# Patient Record
Sex: MALE | Race: WHITE | NOT HISPANIC OR LATINO | Employment: OTHER | URBAN - METROPOLITAN AREA
[De-identification: names, ages, dates, MRNs, and addresses within clinical notes are randomized per-mention and may not be internally consistent; named-entity substitution may affect disease eponyms.]

---

## 2017-01-02 ENCOUNTER — TELEPHONE (OUTPATIENT)
Dept: FAMILY MEDICINE | Facility: CLINIC | Age: 67
End: 2017-01-02

## 2017-01-02 ENCOUNTER — APPOINTMENT (OUTPATIENT)
Dept: GENERAL RADIOLOGY | Facility: CLINIC | Age: 67
DRG: 683 | End: 2017-01-02
Attending: EMERGENCY MEDICINE
Payer: COMMERCIAL

## 2017-01-02 ENCOUNTER — HOSPITAL ENCOUNTER (INPATIENT)
Facility: CLINIC | Age: 67
LOS: 2 days | Discharge: HOME | DRG: 683 | End: 2017-01-04
Attending: EMERGENCY MEDICINE | Admitting: INTERNAL MEDICINE
Payer: COMMERCIAL

## 2017-01-02 ENCOUNTER — APPOINTMENT (OUTPATIENT)
Dept: ULTRASOUND IMAGING | Facility: CLINIC | Age: 67
DRG: 683 | End: 2017-01-02
Attending: EMERGENCY MEDICINE
Payer: COMMERCIAL

## 2017-01-02 DIAGNOSIS — N17.9 ACUTE RENAL FAILURE (ARF) (H): ICD-10-CM

## 2017-01-02 LAB
ALBUMIN SERPL-MCNC: 3.5 G/DL (ref 3.4–5)
ALP SERPL-CCNC: 105 U/L (ref 40–150)
ALT SERPL W P-5'-P-CCNC: 28 U/L (ref 0–70)
ANION GAP SERPL CALCULATED.3IONS-SCNC: 13 MMOL/L (ref 3–14)
AST SERPL W P-5'-P-CCNC: 42 U/L (ref 0–45)
BASOPHILS # BLD AUTO: 0 10E9/L (ref 0–0.2)
BASOPHILS NFR BLD AUTO: 0.5 %
BILIRUB SERPL-MCNC: 1.2 MG/DL (ref 0.2–1.3)
BUN SERPL-MCNC: 29 MG/DL (ref 7–30)
CALCIUM SERPL-MCNC: 8.5 MG/DL (ref 8.5–10.1)
CHLORIDE SERPL-SCNC: 98 MMOL/L (ref 94–109)
CO2 SERPL-SCNC: 26 MMOL/L (ref 20–32)
CREAT SERPL-MCNC: 3.92 MG/DL (ref 0.66–1.25)
D DIMER PPP FEU-MCNC: 0.3 UG/ML FEU (ref 0–0.5)
DIFFERENTIAL METHOD BLD: ABNORMAL
EOSINOPHIL # BLD AUTO: 0.1 10E9/L (ref 0–0.7)
EOSINOPHIL NFR BLD AUTO: 1.8 %
ERYTHROCYTE [DISTWIDTH] IN BLOOD BY AUTOMATED COUNT: 20.6 % (ref 10–15)
GFR SERPL CREATININE-BSD FRML MDRD: 15 ML/MIN/1.7M2
GLUCOSE SERPL-MCNC: 114 MG/DL (ref 70–99)
HCT VFR BLD AUTO: 35.5 % (ref 40–53)
HGB BLD-MCNC: 11 G/DL (ref 13.3–17.7)
IMM GRANULOCYTES # BLD: 0.1 10E9/L (ref 0–0.4)
IMM GRANULOCYTES NFR BLD: 1.1 %
INTERPRETATION ECG - MUSE: NORMAL
LYMPHOCYTES # BLD AUTO: 1.1 10E9/L (ref 0.8–5.3)
LYMPHOCYTES NFR BLD AUTO: 15.9 %
MCH RBC QN AUTO: 25.7 PG (ref 26.5–33)
MCHC RBC AUTO-ENTMCNC: 31 G/DL (ref 31.5–36.5)
MCV RBC AUTO: 83 FL (ref 78–100)
MONOCYTES # BLD AUTO: 0.5 10E9/L (ref 0–1.3)
MONOCYTES NFR BLD AUTO: 8 %
NEUTROPHILS # BLD AUTO: 4.8 10E9/L (ref 1.6–8.3)
NEUTROPHILS NFR BLD AUTO: 72.7 %
NRBC # BLD AUTO: 0 10*3/UL
NRBC BLD AUTO-RTO: 0 /100
NT-PROBNP SERPL-MCNC: 206 PG/ML (ref 0–900)
PLATELET # BLD AUTO: 359 10E9/L (ref 150–450)
POTASSIUM SERPL-SCNC: 3.2 MMOL/L (ref 3.4–5.3)
POTASSIUM SERPL-SCNC: 3.2 MMOL/L (ref 3.4–5.3)
POTASSIUM SERPL-SCNC: 3.8 MMOL/L (ref 3.4–5.3)
PROT SERPL-MCNC: 7.4 G/DL (ref 6.8–8.8)
RBC # BLD AUTO: 4.28 10E12/L (ref 4.4–5.9)
SODIUM SERPL-SCNC: 137 MMOL/L (ref 133–144)
TROPONIN I SERPL-MCNC: NORMAL UG/L (ref 0–0.04)
WBC # BLD AUTO: 6.6 10E9/L (ref 4–11)

## 2017-01-02 PROCEDURE — 71020 XR CHEST 2 VW: CPT

## 2017-01-02 PROCEDURE — 25000132 ZZH RX MED GY IP 250 OP 250 PS 637: Performed by: INTERNAL MEDICINE

## 2017-01-02 PROCEDURE — 99223 1ST HOSP IP/OBS HIGH 75: CPT | Mod: AI | Performed by: INTERNAL MEDICINE

## 2017-01-02 PROCEDURE — 84484 ASSAY OF TROPONIN QUANT: CPT | Performed by: EMERGENCY MEDICINE

## 2017-01-02 PROCEDURE — 85025 COMPLETE CBC W/AUTO DIFF WBC: CPT | Performed by: EMERGENCY MEDICINE

## 2017-01-02 PROCEDURE — 83880 ASSAY OF NATRIURETIC PEPTIDE: CPT | Performed by: EMERGENCY MEDICINE

## 2017-01-02 PROCEDURE — 96360 HYDRATION IV INFUSION INIT: CPT

## 2017-01-02 PROCEDURE — 25000128 H RX IP 250 OP 636: Performed by: INTERNAL MEDICINE

## 2017-01-02 PROCEDURE — 93005 ELECTROCARDIOGRAM TRACING: CPT

## 2017-01-02 PROCEDURE — 36415 COLL VENOUS BLD VENIPUNCTURE: CPT | Performed by: INTERNAL MEDICINE

## 2017-01-02 PROCEDURE — 99285 EMERGENCY DEPT VISIT HI MDM: CPT | Mod: 25

## 2017-01-02 PROCEDURE — 12000000 ZZH R&B MED SURG/OB

## 2017-01-02 PROCEDURE — 80053 COMPREHEN METABOLIC PANEL: CPT | Performed by: EMERGENCY MEDICINE

## 2017-01-02 PROCEDURE — 25000128 H RX IP 250 OP 636: Performed by: EMERGENCY MEDICINE

## 2017-01-02 PROCEDURE — 85379 FIBRIN DEGRADATION QUANT: CPT | Performed by: EMERGENCY MEDICINE

## 2017-01-02 PROCEDURE — 93971 EXTREMITY STUDY: CPT | Mod: RT

## 2017-01-02 PROCEDURE — 84132 ASSAY OF SERUM POTASSIUM: CPT | Performed by: INTERNAL MEDICINE

## 2017-01-02 PROCEDURE — 25000125 ZZHC RX 250: Performed by: INTERNAL MEDICINE

## 2017-01-02 RX ORDER — ONDANSETRON 2 MG/ML
4 INJECTION INTRAMUSCULAR; INTRAVENOUS EVERY 6 HOURS PRN
Status: DISCONTINUED | OUTPATIENT
Start: 2017-01-02 | End: 2017-01-04 | Stop reason: HOSPADM

## 2017-01-02 RX ORDER — ACETAMINOPHEN 325 MG/1
650 TABLET ORAL EVERY 4 HOURS PRN
Status: DISCONTINUED | OUTPATIENT
Start: 2017-01-02 | End: 2017-01-04 | Stop reason: HOSPADM

## 2017-01-02 RX ORDER — ACETAMINOPHEN 325 MG/1
650 TABLET ORAL EVERY 4 HOURS PRN
Status: DISCONTINUED | OUTPATIENT
Start: 2017-01-02 | End: 2017-01-02

## 2017-01-02 RX ORDER — ASPIRIN 325 MG
325 TABLET ORAL DAILY
Status: DISCONTINUED | OUTPATIENT
Start: 2017-01-03 | End: 2017-01-04 | Stop reason: HOSPADM

## 2017-01-02 RX ORDER — PROCHLORPERAZINE 25 MG
12.5 SUPPOSITORY, RECTAL RECTAL EVERY 12 HOURS PRN
Status: DISCONTINUED | OUTPATIENT
Start: 2017-01-02 | End: 2017-01-04 | Stop reason: HOSPADM

## 2017-01-02 RX ORDER — OXYCODONE HYDROCHLORIDE 5 MG/1
5-10 TABLET ORAL
Status: DISCONTINUED | OUTPATIENT
Start: 2017-01-02 | End: 2017-01-04 | Stop reason: HOSPADM

## 2017-01-02 RX ORDER — POTASSIUM CHLORIDE 1500 MG/1
20-40 TABLET, EXTENDED RELEASE ORAL
Status: DISCONTINUED | OUTPATIENT
Start: 2017-01-02 | End: 2017-01-04 | Stop reason: HOSPADM

## 2017-01-02 RX ORDER — PROCHLORPERAZINE MALEATE 5 MG
5 TABLET ORAL EVERY 6 HOURS PRN
Status: DISCONTINUED | OUTPATIENT
Start: 2017-01-02 | End: 2017-01-04 | Stop reason: HOSPADM

## 2017-01-02 RX ORDER — POTASSIUM CHLORIDE 29.8 MG/ML
20 INJECTION INTRAVENOUS
Status: DISCONTINUED | OUTPATIENT
Start: 2017-01-02 | End: 2017-01-04 | Stop reason: HOSPADM

## 2017-01-02 RX ORDER — HYDRALAZINE HYDROCHLORIDE 20 MG/ML
10 INJECTION INTRAMUSCULAR; INTRAVENOUS EVERY 4 HOURS PRN
Status: DISCONTINUED | OUTPATIENT
Start: 2017-01-02 | End: 2017-01-04 | Stop reason: HOSPADM

## 2017-01-02 RX ORDER — HYDROMORPHONE HYDROCHLORIDE 1 MG/ML
.3-.5 INJECTION, SOLUTION INTRAMUSCULAR; INTRAVENOUS; SUBCUTANEOUS
Status: DISCONTINUED | OUTPATIENT
Start: 2017-01-02 | End: 2017-01-04 | Stop reason: HOSPADM

## 2017-01-02 RX ORDER — POTASSIUM CHLORIDE 1.5 G/1.58G
20-40 POWDER, FOR SOLUTION ORAL
Status: DISCONTINUED | OUTPATIENT
Start: 2017-01-02 | End: 2017-01-04 | Stop reason: HOSPADM

## 2017-01-02 RX ORDER — POTASSIUM CHLORIDE 7.45 MG/ML
10 INJECTION INTRAVENOUS
Status: DISCONTINUED | OUTPATIENT
Start: 2017-01-02 | End: 2017-01-04 | Stop reason: HOSPADM

## 2017-01-02 RX ORDER — SODIUM CHLORIDE 9 MG/ML
INJECTION, SOLUTION INTRAVENOUS CONTINUOUS
Status: DISCONTINUED | OUTPATIENT
Start: 2017-01-02 | End: 2017-01-04 | Stop reason: HOSPADM

## 2017-01-02 RX ORDER — BISACODYL 10 MG
10 SUPPOSITORY, RECTAL RECTAL DAILY PRN
Status: DISCONTINUED | OUTPATIENT
Start: 2017-01-02 | End: 2017-01-04 | Stop reason: HOSPADM

## 2017-01-02 RX ORDER — NALOXONE HYDROCHLORIDE 0.4 MG/ML
.1-.4 INJECTION, SOLUTION INTRAMUSCULAR; INTRAVENOUS; SUBCUTANEOUS
Status: DISCONTINUED | OUTPATIENT
Start: 2017-01-02 | End: 2017-01-04 | Stop reason: HOSPADM

## 2017-01-02 RX ORDER — POLYETHYLENE GLYCOL 3350 17 G/17G
17 POWDER, FOR SOLUTION ORAL DAILY PRN
Status: DISCONTINUED | OUTPATIENT
Start: 2017-01-02 | End: 2017-01-04 | Stop reason: HOSPADM

## 2017-01-02 RX ORDER — ONDANSETRON 4 MG/1
4 TABLET, ORALLY DISINTEGRATING ORAL EVERY 6 HOURS PRN
Status: DISCONTINUED | OUTPATIENT
Start: 2017-01-02 | End: 2017-01-04 | Stop reason: HOSPADM

## 2017-01-02 RX ADMIN — SODIUM CHLORIDE 250 ML: 9 INJECTION, SOLUTION INTRAVENOUS at 10:09

## 2017-01-02 RX ADMIN — POTASSIUM CHLORIDE 40 MEQ: 1500 TABLET, EXTENDED RELEASE ORAL at 13:12

## 2017-01-02 RX ADMIN — ONDANSETRON HYDROCHLORIDE 4 MG: 2 SOLUTION INTRAMUSCULAR; INTRAVENOUS at 16:21

## 2017-01-02 RX ADMIN — POTASSIUM CHLORIDE 20 MEQ: 1500 TABLET, EXTENDED RELEASE ORAL at 15:05

## 2017-01-02 RX ADMIN — SODIUM CHLORIDE: 9 INJECTION, SOLUTION INTRAVENOUS at 21:49

## 2017-01-02 RX ADMIN — SODIUM CHLORIDE: 9 INJECTION, SOLUTION INTRAVENOUS at 12:38

## 2017-01-02 RX ADMIN — POTASSIUM CHLORIDE 40 MEQ: 1500 TABLET, EXTENDED RELEASE ORAL at 17:32

## 2017-01-02 RX ADMIN — ACETAMINOPHEN 650 MG: 325 TABLET, FILM COATED ORAL at 20:59

## 2017-01-02 RX ADMIN — POTASSIUM CHLORIDE 20 MEQ: 1500 TABLET, EXTENDED RELEASE ORAL at 19:24

## 2017-01-02 ASSESSMENT — ENCOUNTER SYMPTOMS
VOMITING: 1
SHORTNESS OF BREATH: 1
NAUSEA: 0
APPETITE CHANGE: 0
DIZZINESS: 1
HEADACHES: 1

## 2017-01-02 ASSESSMENT — PAIN DESCRIPTION - DESCRIPTORS: DESCRIPTORS: HEADACHE

## 2017-01-02 NOTE — IP AVS SNAPSHOT
[address], [address]    [facility] [address]    Phone:  [phone]                                       After Visit Summary   1/2/2017    Vaibhav Crabtree    MRN: [id number]           After Visit Summary Signature Page     I have received my discharge instructions, and my questions have been answered. I have discussed any challenges I see with this plan with the nurse or doctor.    ..........................................................................................................................................  Patient/Patient Representative Signature      ..........................................................................................................................................  Patient Representative Print Name and Relationship to Patient    ..................................................               ................................................  Date                                            Time    ..........................................................................................................................................  Reviewed by Signature/Title    ...................................................              ..............................................  Date                                                            Time

## 2017-01-02 NOTE — ED NOTES
[facility]  ED Nurse Handoff Report    ED Chief complaint: Shortness of Breath      ED Diagnosis:   Final diagnoses:   None       Code Status: see epic    Allergies:   Allergies   Allergen Reactions     No Known Allergies        Activity level:  Stand with Assist     Needed?: No    Isolation: No  Infection: Not Applicable    Bariatric?: No      Vital Signs:   Filed Vitals:    01/02/17 1004 01/02/17 1005 01/02/17 1015 01/02/17 1100   BP: 103/65 77/47 100/68 109/70   Pulse:       Temp:       TempSrc:       Resp:   12 14   Height:       Weight:       SpO2: 95%   95%       Cardiac Rhythm: ,   Cardiac  Cardiac Rhythm: Normal sinus rhythm    Pain level: 0-10 Pain Scale: 3    Is this patient confused?: No    Patient Report: Initial Complaint: intermittent SOB, dizziness and left shoulder pain X 2 days. States dizziness has been so severe he has to lye down to avoid syncope, once lying he often develops HA but dizziness resolves  Focused Assessment: AOX4. Dizziness with standing. Orthostatic Hypotension, see epic.   Tests Performed: labs, cxr  Abnormal Results: crt ^  Treatments provided: 250cc NS bolus    Family Comments: none    OBS brochure/video discussed/provided to patient: No    ED Medications:   Medications   0.9% sodium chloride BOLUS (0 mLs Intravenous Stopped 1/2/17 1100)       Drips infusing?:  No      ED NURSE PHONE NUMBER: [phone]

## 2017-01-02 NOTE — TELEPHONE ENCOUNTER
Vaibhav Crabtree is a 66 year old male who calls with dizziness with standing and walking the last 2 days. Denies chest pain or weakness on one side of his body. Denies vision changes. States that he feels SOB with the dizziness. Symptoms improve with rest.    NURSING ASSESSMENT:  Description:  above  Onset/duration:  Has occurred off and on the last 2 days  Precip. factors:  Unknown, has history of CHF  Associated symptoms:  Dizziness with SOB  Improves/worsens symptoms:  Improves with rest, symptoms return when he stands  Pain scale (0-10)   0/10    Last exam/Treatment:  12/21/16  Allergies:   Allergies   Allergen Reactions     No Known Allergies        MEDICATIONS:       NURSING PLAN: Nursing advice to patient to ED now another to drive    RECOMMENDED DISPOSITION:  To ED, another person to drive - patient states that he will call his brother  Will comply with recommendation: Yes  If further questions/concerns or if symptoms do not improve, worsen or new symptoms develop, call your PCP or [facility] as soon as possible.      Guideline used:  Telephone Triage Protocols for Nurses, Third Edition, Germania Adams RN

## 2017-01-02 NOTE — ED PROVIDER NOTES
History     Chief Complaint:  Shortness of Breath      HPI   Viabhav Crabtree is a 66 year old male with a history of congestive heart failure, coronary artery disease, hypertension on apirin who presents for evaluation of shortness of breath. Two days ago while cooking turkey, the patient reports that he started to feel dizzy and has also noticed that he would have shortness of breath with exertion. He note that his symptoms resolve once he rests for some time, but he would get headaches soon afterwards. He states that his symptoms have been intermittent since the onset. The patient is also complaining of left shoulder pain with movement, but denies appetite change, nausea, vomiting, leg swelling or history of blood clots. Currently the patient is complaining of a headache. The patient recently had an ultrasound done for right leg pain with results as shown below.     Lower extremity venous duplex ultrasound, unilateral right, 12/22/16:  Conclusion: No evidence of DVT in the right lower extremity.  Probable small right knee joint effusion and Baker s cyst.     No recent medication changes or recent illness.     Allergies:  NKDA      Medications:    Protonix  Lisinopril  Carafate  Reglan  Pletal  Coreg  Lasix  Tramadol  Nitrostat  Tylenol  Aspirin  Fergon       Past Medical History:    Bite of unspecified animal  Hypertension  Congestive heart failure  Osteoporosis  Idiopathic cardiomyopathy  Hyperlipidemia  CAD  Mitral regurgitation  Claudication of right lower extremity      Past Surgical History:    Left rotator cuff repair  Tonsillectomy  Ulnar nerve  Right rotator cuff repair      Family History:    Father: COPD  Brother: Cancer  Son: Eye disorder  Daughter: Depression      Social History:  Marital Status:     Smoking status: Former smoker  Alcohol use: Once/every other day     Review of Systems   Constitutional: Negative for appetite change.   Respiratory: Positive for shortness of breath.   "  Gastrointestinal: Positive for vomiting. Negative for nausea.   Neurological: Positive for dizziness and headaches.   All other systems reviewed and are negative.    Physical Exam   First Vitals:  BP: (!) 89/65 mmHg  Pulse: 83  Temp: 97.5  F (36.4  C)  Resp: 18  Height: 170.2 cm (5' 7\")  Weight: 73.483 kg (162 lb)  SpO2: 96 %    Physical Exam  General: Resting comfortably on the gurney  Eyes:  The pupils are equal and round  ENT:    Atraumatic  Neck:  Normal range of motion  CV:  Regular rate and rhythm    Skin warm and well perfused   Resp:  Lungs are clear    Non-labored    No rales    No wheezing   GI:  Abdomen is soft, there is no rigidity    No distension    No rebound tenderness     No abdominal tenderness  MS:  Normal muscular tone    No leg swelling  Skin:  No rash or acute skin lesions noted  Neuro:   Awake, alert.      Speech is normal and fluent.    Face is symmetric.     Moves all extremities equally  Psych:  Normal affect.  Appropriate interactions.    Emergency Department Course   ECG @ 0950  Indication: Shortness of breath  Rate 79 bpm.   MN interval 152 ms.   QRS duration 104 ms.   QT/QTc 428/490 ms.   P-R-T axes -18.  Notes: Normal sinus rhythm. Inferior infarct, age undetermined.  N STEMI.  Time read 0953    Imaging:  Radiographic findings were communicated with the patient who voiced understanding of the findings.    Chest XR,  PA & LAT  Final Result  IMPRESSION: Cardiac silhouette and pulmonary vasculature are within  normal limits. No focal airspace disease, pleural effusion or  pneumothorax.    ADAIR MUÑOZ Lower Extremity Venous Duplex Right  Final Result  IMPRESSION: No evidence of DVT.    HECTOR REYNOLDS MD      Laboratory:  CBC: WBC 6.6 (WNL) HGB 11.0 (L)  (WNL)   CMP: Cr 3.92 (H) Glucose 114 (H) Potassium 3.2 (L) GFR 15 (L) GFR Black 19 (L) Rest WNL  0956: Troponin I: <0.015 (WNL)  D dimer: 0.3 (WNL)   BNP: 206 (WNL)    Interventions:  1009: Normal saline, 250 mg, IV    ED " Course:  Nursing notes and past medical history reviewed.   I performed a physical examination of the patient as documented above.  I explained the plan with the patient who consents to this.   The above workups were undertaken.    1041: The patient was updated on his lab results.   1122: The patient was discussed with Dr. Locke of hospitalist service who agreed to accept him.   I personally reviewed the laboratory and imaging results with the Patient and answered all related questions prior to admission.  Findings and plan explained to the Patient who consents to admission. Discussed the patient with Dr. Locke, who will admit the patient to a med tele bed for further monitoring, evaluation, and treatment.     Impression & Plan      Medical Decision Making:  Vaibhav Crabtree is a 66 year old male who presents to the ED with shortness of breath. The patient's blood pressure was slightly low on arrival to the ED, but did improve. He did have positive orthostatics and BP dropped to systolic of 70s when standing. Laboratory evaluation was obtained and his creatinine has significantly increased to 3.92 from the baseline of 1.3. Potassium is slightly low at 3.2. Rest of laboratory exam including troponin and d dimer are within normal limits. Chest XR was unremarkable. He was having some right leg pain and so ultrasound was obtained that did not show DVT. Due to the significant increase in his creatinine, will have patient stay in the hospital. Suspect that with the increase in creatinine, his medications are being metabolized not as well and decreasing the blood pressure. Unclear why creatinine is increasing. I discussed with patient who agreed to accept the patient.     Diagnosis:    ICD-10-CM   1. Acute renal failure (ARF) (H) N17.9         Disposition:   Admit to a med tele bed under the care of Dr. Locke.       Griffin TENA, am serving as a scribe on 1/2/2017 at 9:51 AM to personally document services performed by  Leann Rose MD, based on my observations and the provider's statements to me.          Leann Rose MD  01/02/17 2026

## 2017-01-02 NOTE — PHARMACY-ADMISSION MEDICATION HISTORY
Admission medication history interview status for the 1/2/2017  admission is complete. See EPIC admission navigator for prior to admission medications     Medication history source reliability:Good    Actions taken by pharmacist (provider contacted, etc):None     Additional medication history information not noted on PTA med list :None    Medication reconciliation/reorder completed by provider prior to medication history? No    Time spent in this activity: 15 minutes    Prior to Admission medications    Medication Sig Last Dose Taking? Auth Provider   Acetaminophen (TYLENOL PO) Take 650 mg by mouth every 4 hours as needed for mild pain or fever unknown Yes Unknown, Entered By History   lisinopril (PRINIVIL,ZESTRIL) 20 MG tablet Take 1 tablet (20 mg) by mouth daily 1/2/2017 at am Yes Xander Lee MD   carvedilol (COREG) 25 MG tablet Take 1 tablet (25 mg) by mouth 2 times daily (with meals) 1/2/2017 at am Yes Richard Asif MD   furosemide (LASIX) 20 MG tablet Take 1 tablet (20 mg) by mouth daily as needed Hold until instructed to resume by primary care provider. unknown Yes Richard Asif MD   aspirin 325 MG tablet Take 325 mg by mouth daily 1/2/2017 at am Yes Reported, Patient   order for DME Equipment being ordered: hinged knee bracekate Grant Charles, MD

## 2017-01-02 NOTE — PLAN OF CARE
Problem: Goal Outcome Summary  Goal: Goal Outcome Summary  Outcome: No Change  New admit from ED. A& O times four. Vitals stable, except he is positive for orthostatic. IVF started per order. Potasium was 3.2, he has 40 MEQ K+ tab, plan to repeat the 20 MEQ in 2 hrs, and recheck level. Up with SBA. Tele: NSR. Continue to monitor.

## 2017-01-02 NOTE — IP AVS SNAPSHOT
MRN:[id number]                      After Visit Summary   1/2/2017    Vaibhav Crabtree    MRN: [id number]           Thank you!     Thank you for choosing [facility] for your care. Our goal is always to provide you with excellent care. Hearing back from our patients is one way we can continue to improve our services. Please take a few minutes to complete the written survey that you may receive in the mail after you visit with us. Thank you!        Patient Information     Date Of Birth          1950        About your hospital stay     You were admitted on:  January 2, 2017 You last received care in the:  [address]    You were discharged on:  January 4, 2017        Reason for your hospital stay       Acute kidney failure and hypotension, due to dehydration.                  Who to Call     For medical emergencies, please call 911.  For non-urgent questions about your medical care, please call your primary care provider or clinic, [phone]          Attending Provider     Provider    Leann Rose MD Tata, Sujatha, MD       Primary Care Provider Office Phone # Fax #    Xander Lee [phone]       [address] [address]        After Care Instructions     Activity       Your activity upon discharge: activity as tolerated.            Diet       Follow this diet upon discharge:   Combination Diet Regular Diet Adult                  Follow-up Appointments     Follow-up and recommended labs and tests        Follow up with primary care provider as needed.                  Pending Results     No orders found from 1/1/2017 to 1/3/2017.            Statement of Approval     Ordered          01/04/17 1646  I have reviewed and agree with all the recommendations and orders detailed in this document.   EFFECTIVE NOW     Approved and electronically signed by:  Ole Mae MD             Admission  "Information        Provider Department Dept Phone    2017 Ro Locke MD [facility] [phone]      Your Vitals Were     Blood Pressure Pulse Temperature    145/82 mmHg 73 97.8  F (36.6  C) (Oral)    Respirations Height Weight    16 1.702 m (5' 7\") 73.301 kg (161 lb 9.6 oz)    BMI (Body Mass Index) Pulse Oximetry       25.30 kg/m2 96%       MyChart Information     [employer] lets you send messages to your doctor, view your test results, renew your prescriptions, schedule appointments and more. To sign up, go to [email or web address] . Click on \"Log in\" on the left side of the screen, which will take you to the Welcome page. Then click on \"Sign up Now\" on the right side of the page.     You will be asked to enter the access code listed below, as well as some personal information. Please follow the directions to create your username and password.     Your access code is: [email or web address]  Expires: 3/21/2017 10:09 AM     Your access code will  in 90 days. If you need help or a new code, please call your [facility] clinic or [phone].        Care EveryWhere ID     This is your Care EveryWhere ID. This could be used by other organizations to access your [facility] medical records  [id number]           Review of your medicines      CONTINUE these medicines which have NOT CHANGED        Dose / Directions    aspirin 325 MG tablet        Dose:  325 mg   Take 325 mg by mouth daily   Refills:  0       carvedilol 25 MG tablet   Commonly known as:  COREG   Used for:  Essential hypertension, Chronic combined systolic and diastolic congestive heart failure (H)        Dose:  25 mg   Take 1 tablet (25 mg) by mouth 2 times daily (with meals)   Quantity:  180 tablet   Refills:  4       furosemide 20 MG tablet   Commonly known as:  LASIX   Used for:  Idiopathic cardiomyopathy (H)        Dose:  20 mg   Take 1 tablet (20 mg) by mouth daily as needed Hold until instructed to resume by primary care provider.   Quantity:  30 " tablet   Refills:  12       lisinopril 20 MG tablet   Commonly known as:  PRINIVIL/ZESTRIL   Used for:  Essential hypertension, hypertension with unspecified goal        Dose:  20 mg   Take 1 tablet (20 mg) by mouth daily   Quantity:  90 tablet   Refills:  1       order for DME   Used for:  Primary localized osteoarthrosis, lower leg, right, Instability of knee joint, right        Equipment being ordered: hinged knee brace, large   Quantity:  1 Device   Refills:  0       TYLENOL PO        Dose:  650 mg   Take 650 mg by mouth every 4 hours as needed for mild pain or fever   Refills:  0                Protect others around you: Learn how to safely use, store and throw away your medicines at www.disposemymeds.org.             Medication List: This is a list of all your medications and when to take them. Check marks below indicate your daily home schedule. Keep this list as a reference.      Medications           Morning Afternoon Evening Bedtime As Needed    aspirin 325 MG tablet   Take 325 mg by mouth daily   Last time this was given:  325 mg on 1/4/2017  9:36 AM   Next Dose Due:  Tomorrow AM                                     carvedilol 25 MG tablet   Commonly known as:  COREG   Take 1 tablet (25 mg) by mouth 2 times daily (with meals)   Next Dose Due:  Tomorrow AM & PM                                        furosemide 20 MG tablet   Commonly known as:  LASIX   Take 1 tablet (20 mg) by mouth daily as needed Hold until instructed to resume by primary care provider.   Next Dose Due:  Hold until follow-up w/PCP                                lisinopril 20 MG tablet   Commonly known as:  PRINIVIL/ZESTRIL   Take 1 tablet (20 mg) by mouth daily   Next Dose Due:  Tomorrow AM                                   order for DME   Equipment being ordered: hinged knee brace, large                                TYLENOL PO   Take 650 mg by mouth every 4 hours as needed for mild pain or fever   Last time this was given:  650 mg on  1/2/2017  8:59 PM

## 2017-01-02 NOTE — H&P
PRIMARY CARE PHYSICIAN:  Dr. Xander Lee      CHIEF COMPLAINT:  Dizziness and presyncope.      HISTORY OF PRESENT ILLNESS:  Mr. Vaibhav Crabtree is a 66-year-old male with history of idiopathic cardiomyopathy and known coronary artery disease managed medically, hypertension, hyperlipidemia who presented to the emergency room with complaints of dizziness and presyncopal feeling for the last 3-4 days.  Two days ago he was cooking turkey and he felt dizzy and felt like he was going to pass out, so he sat down.  The symptoms improved.  Today, he is at work.  He works for [employer] and at work while he was walking, he felt dizzy and lightheaded and was pale, so his coworkers advised him to come into the emergency room.  He also complains of mild shortness of breath while he is ambulating but is not much worse from his baseline.  Denies any chest pain or chest pressure.  No nausea or vomiting, no constipation or diarrhea issues.  No bleeding issues.  Denies any appetite changes recently.  No medication changes recently.  In the emergency room, the patient was orthostatic with a blood pressure in the 100s while he is lying down and blood pressure dropped to 70s with at 30-point drop in blood pressure, so his orthostatic vitals are positive for orthostatic hypotension.  His lab work showed he is in acute renal failure with creatinine of 3.92 with a baseline creatinine of 1.3 so a request for hospitalization was made for orthostatic hypotension and acute renal failure.      PAST MEDICAL HISTORY:   1.  Idiopathic cardiomyopathy with EF of 25-30% and with medical management his EF increased to 50% -55% recently.  His most recent echocardiogram in 01/2016 showed LV systolic performance is borderline reduced, EF is at 50-55%.  There is borderline global hypokinesis of the left ventricle.  The left ventricle is borderline dilated and left atrium is borderline dilated as well.  There is mild to moderate 1-2+ mitral  regurgitation and trace to mild aortic regurgitation.  He had a CT coronary angiogram in 11/2015 after hospitalization for chest pain which showed LAD occlusion at 50-70%, but at that time, Cardiology was involved and they recommended medical management for the LAD lesion.   2.  Hypertension.   3.  Hyperlipidemia.      ALLERGIES:  No known drug allergies.      SOCIAL HISTORY:  Former smoker, remote history of smoking, quit smoking 45 years ago.  He is .  Drinks socially 1-2 beers.  [facility]  He has 1 stepdaughter and he works at [employer].      FAMILY HISTORY:  Father had emphysema.  He does not know much about his mother's health history.      REVIEW OF SYSTEMS:  Ten-point review of systems was done and is negative except as in HPI.      PHYSICAL EXAMINATION:   VITAL SIGNS:  His temperature is 97.5 degrees Fahrenheit, afebrile, heart rate is ranging from 70s-80s.  Blood pressure was 110/69 lying down, dropped to 77/47 with standing up with orthostatic vitals positive.  Pulse rate is ranging from 12-16.  Respiratory rate is 12 to 16,.  He is satting 96% on room air.   GENERAL:  He is alert, oriented, pleasant male lying comfortably in bed, not in any acute distress.   HEENT:  Atraumatic, normocephalic.   NECK:  Supple.   HEART:  S1, S2 heard, no murmurs, rubs or gallops.   LUNGS:  Clear to auscultation.   ABDOMEN:  Soft, nontender, nondistended, no hepatosplenomegaly.   EXTREMITIES:  No clubbing, cyanosis or edema.   NEUROLOGIC:  Able to move all the extremities.  Gait is not tested due to the orthostatic hypotension.   SKIN:  Warm and dry.        PRIOR TO ADMISSION MEDICATIONS:   1.  Tylenol 650 mg p.o. q.4 h. as needed for mild pain or fever.   2.  Aspirin 325 mg p.o. daily.   3.  Coreg 25 mg p.o. 2 times daily.   4.  Lasix 20 mg p.o. daily as needed.   5.  Lisinopril 20 mg p.o. daily.      LABORATORY AND RADIOLOGICAL INVESTIGATIONS:  A 12-lead EKG showed sinus rhythm with no acute signs of  ischemia.  CBC showed WBC count of 6.6, hemoglobin 11, platelet count at 359.  His most recent hemoglobin in 2016 was at 9.7.  BMP showed sodium 137, potassium is low at 3.2, chloride 98, bicarbonate 26, anion gap 13, glucose 114, BUN 29, creatinine is elevated at 3.92.  Baseline creatinine 1.3.  All his LFTs are normal.  Troponin is less than 0.015, NT-proBNP showed 206.  Ultrasound right duplex showed no evidence of DVT.  Chest x-ray was done and showed lungs are clear.  No pleural effusion or pneumothorax seen.      ASSESSMENT AND PLAN:  A 66-year-old male with history of cardiomyopathy and moderate coronary artery disease, hypertension and hyperlipidemia presenting with:   1.  Acute kidney injury secondary to orthostatic hypotension, most likely secondary to multiple medications and dehydration contributing, so will gently hydrate him with IV fluids.  I will hold all his blood pressure medications for now and follow his orthostatic vitals closely and BUN, creatinine and electrolytes closely.   2.  Orthostatic hypotension, hold his prior to admission lisinopril and furosemide.  Will use p.r.n. hydralazine if the blood pressure was greater than 180.   3.  Coronary artery disease.  Continue regular aspirin.   4.  Anemia.  Follow his hemoglobin closely and transfuse if it drops less than 7.   5.  Code status is discussed with the patient and he wants to be DNR/DNI, which will be offered to him.  He will be admitted as an inpatient as I am anticipating more than 2 nights stay.         JERO DELCID MD             D: 2017 12:33   T: 2017 12:55   MT: JANINE      Name:     DAMIAN MEEKS   MRN:      [id number]        Account:      [id number]   :      1950           Admitted:     [id number]      Document: [id number]       cc: Xander Lee MD

## 2017-01-03 LAB
ALBUMIN UR-MCNC: 10 MG/DL
ANION GAP SERPL CALCULATED.3IONS-SCNC: 11 MMOL/L (ref 3–14)
APPEARANCE UR: CLEAR
BILIRUB UR QL STRIP: NEGATIVE
BUN SERPL-MCNC: 26 MG/DL (ref 7–30)
CALCIUM SERPL-MCNC: 7.5 MG/DL (ref 8.5–10.1)
CHLORIDE SERPL-SCNC: 108 MMOL/L (ref 94–109)
CO2 SERPL-SCNC: 24 MMOL/L (ref 20–32)
COLOR UR AUTO: YELLOW
CREAT SERPL-MCNC: 2.92 MG/DL (ref 0.66–1.25)
ERYTHROCYTE [DISTWIDTH] IN BLOOD BY AUTOMATED COUNT: 20.9 % (ref 10–15)
GFR SERPL CREATININE-BSD FRML MDRD: 22 ML/MIN/1.7M2
GLUCOSE SERPL-MCNC: 112 MG/DL (ref 70–99)
GLUCOSE UR STRIP-MCNC: NEGATIVE MG/DL
HCT VFR BLD AUTO: 32.2 % (ref 40–53)
HGB BLD-MCNC: 9.9 G/DL (ref 13.3–17.7)
HGB UR QL STRIP: NEGATIVE
KETONES UR STRIP-MCNC: NEGATIVE MG/DL
LEUKOCYTE ESTERASE UR QL STRIP: NEGATIVE
MCH RBC QN AUTO: 25.8 PG (ref 26.5–33)
MCHC RBC AUTO-ENTMCNC: 30.7 G/DL (ref 31.5–36.5)
MCV RBC AUTO: 84 FL (ref 78–100)
MUCOUS THREADS #/AREA URNS LPF: PRESENT /LPF
NITRATE UR QL: NEGATIVE
PH UR STRIP: 5.5 PH (ref 5–7)
PLATELET # BLD AUTO: 288 10E9/L (ref 150–450)
POTASSIUM SERPL-SCNC: 3.7 MMOL/L (ref 3.4–5.3)
RBC # BLD AUTO: 3.84 10E12/L (ref 4.4–5.9)
RBC #/AREA URNS AUTO: 0 /HPF (ref 0–2)
SODIUM SERPL-SCNC: 143 MMOL/L (ref 133–144)
SP GR UR STRIP: 1.01 (ref 1–1.03)
URN SPEC COLLECT METH UR: ABNORMAL
UROBILINOGEN UR STRIP-MCNC: NORMAL MG/DL (ref 0–2)
WBC # BLD AUTO: 4.8 10E9/L (ref 4–11)
WBC #/AREA URNS AUTO: 2 /HPF (ref 0–2)

## 2017-01-03 PROCEDURE — 81001 URINALYSIS AUTO W/SCOPE: CPT | Performed by: INTERNAL MEDICINE

## 2017-01-03 PROCEDURE — 80048 BASIC METABOLIC PNL TOTAL CA: CPT | Performed by: INTERNAL MEDICINE

## 2017-01-03 PROCEDURE — 85027 COMPLETE CBC AUTOMATED: CPT | Performed by: INTERNAL MEDICINE

## 2017-01-03 PROCEDURE — 25000132 ZZH RX MED GY IP 250 OP 250 PS 637: Performed by: INTERNAL MEDICINE

## 2017-01-03 PROCEDURE — 99232 SBSQ HOSP IP/OBS MODERATE 35: CPT | Performed by: INTERNAL MEDICINE

## 2017-01-03 PROCEDURE — 25000128 H RX IP 250 OP 636: Performed by: INTERNAL MEDICINE

## 2017-01-03 PROCEDURE — 25000125 ZZHC RX 250: Performed by: INTERNAL MEDICINE

## 2017-01-03 PROCEDURE — 36415 COLL VENOUS BLD VENIPUNCTURE: CPT | Performed by: INTERNAL MEDICINE

## 2017-01-03 PROCEDURE — 12000000 ZZH R&B MED SURG/OB

## 2017-01-03 RX ADMIN — ASPIRIN 325 MG ORAL TABLET 325 MG: 325 PILL ORAL at 08:21

## 2017-01-03 RX ADMIN — ONDANSETRON 4 MG: 4 TABLET, ORALLY DISINTEGRATING ORAL at 01:41

## 2017-01-03 RX ADMIN — SODIUM CHLORIDE: 9 INJECTION, SOLUTION INTRAVENOUS at 06:14

## 2017-01-03 RX ADMIN — SODIUM CHLORIDE: 9 INJECTION, SOLUTION INTRAVENOUS at 15:22

## 2017-01-03 NOTE — PLAN OF CARE
Problem: Goal Outcome Summary  Goal: Goal Outcome Summary  Outcome: Improving  A&Ox4. VSS, orthostatic hypotension. Tele: NSR. Voiding in urinal- U/A sent. IVF @ 100/hr. Regular diet. SBA. Cr: 2.92 this a.m. Pt states he is feeling better today. Continue to monitor.

## 2017-01-03 NOTE — PROGRESS NOTES
[facility]    Hospitalist Progress Note  Ole Mae MD    Date of Service : 01/03/2017    Assessment and Plan  66-year-old male with history of idiopathic cardiomyopathy, coronary artery disease, hypertension and hyperlipidemia, who was admitted on 1/2/17 due to dizziness and presyncopal feeling for 3-4 days. In the ER, the pt had orthostatic hypotension, with his B.P. dropping from 103/65mmHg to 77/47mmHg - Standing. Work up done on 1/2/17 revealed, CMP significant for K+ 3.2, creat 3.92. CBC revealed, Hb 11, WBC 6.6, Plts 359. D-Dimer 0.3. Initial troponin was negative. BNP was normal. Right lower extremity venous US on 1/2/17 was negative for DVT. Chest x-rays on 1/2/17 was negative.     1.  Acute kidney injury:  due to dehydration. Creat 3.92-->2.92 on 1/3/17. Continue IV N/Saline. Monitor BMP.    2.  Orthostatic hypotension: improved. prior to admission lisinopril and furosemide are on hold, while being rehydrated. For IV. Hydralazine prn, for SBP > 180mmHg.      3.  Coronary artery disease/Idiopathic Cardiomyopathy: Continue aspirin. Lisinopril and lasix are on hold.      4.  Anemia: Hb 11-->9.9g/dl on 1/3/17. Follow hemoglobin closely. For transfusion if Hb drops less than 7g/dl.        DVT Prophylaxis: Pneumatic Compression Devices  Code Status: DNR/DNI    Disposition: Expected discharge in 1-2 days.      Interval History  The pt reported feeling better. He is less dizzy today. The pt denied having any diarrheal illness or vomiting prior to admission. According to his daughter, she told me in confidence that her dad drinks alcohol heavily and recently restarted drinking yesterday. She told me that his roommate informed her that he had vomiting prior to admission and that his oral intake had been poor lately.     -Data reviewed today: I reviewed all new labs and imaging results over the last 24 hours. I personally reviewed no images or EKG's today.    Physical Exam  Temp: 98.1   F (36.7  C) Temp src: Oral BP: 113/71 mmHg Pulse: 76 Heart Rate: 89 Resp: 18 SpO2: 93 % O2 Device: None (Room air)    Filed Vitals:    01/02/17 0949 01/02/17 1224   Weight: 73.483 kg (162 lb) 73.301 kg (161 lb 9.6 oz)     Vital Signs with Ranges  Temp:  [97.5  F (36.4  C)-98.6  F (37  C)] 98.1  F (36.7  C)  Pulse:  [76-83] 76  Heart Rate:  [70-94] 89  Resp:  [12-18] 18  BP: ()/(47-73) 113/71 mmHg  SpO2:  [93 %-97 %] 93 %  I/O last 3 completed shifts:  In: 2236 [P.O.:240; I.V.:1746; IV Piggyback:250]  Out: 550 [Urine:550]    Constitutional: Adult white male, awake, cooperative, no apparent distress, O2 Sats 94% on RA  Respiratory: BS vesicular bilaterally, no crackles or wheezing  Cardiovascular: S1 and S2, reg, no murmur noted  GI: Soft, non-distended, non-tender, no masses, BS present+  Skin/Integumen: No rashes  Extremities: No pedal edema    Medications    NaCl 100 mL/hr at 01/03/17 0614       aspirin  325 mg Oral Daily       Data    Recent Labs  Lab 01/03/17  0725 01/02/17  2125 01/02/17  1658 01/02/17  0956   WBC 4.8  --   --  6.6   HGB 9.9*  --   --  11.0*   MCV 84  --   --  83     --   --  359     --   --  137   POTASSIUM 3.7 3.8 3.2* 3.2*   CHLORIDE 108  --   --  98   CO2 24  --   --  26   BUN 26  --   --  29   CR 2.92*  --   --  3.92*   ANIONGAP 11  --   --  13   ALCIDES 7.5*  --   --  8.5   *  --   --  114*   ALBUMIN  --   --   --  3.5   PROTTOTAL  --   --   --  7.4   BILITOTAL  --   --   --  1.2   ALKPHOS  --   --   --  105   ALT  --   --   --  28   AST  --   --   --  42   TROPI  --   --   --  <0.015The 99th percentile for upper reference range is 0.045 ug/L.  Troponin values in the range of 0.045 - 0.120 ug/L may be associated with risks of adverse clinical events.       Recent Results (from the past 24 hour(s))   US Lower Extremity Venous Duplex Right    Narrative    ULTRASOUND RIGHT LOWER EXTREMITY VENOUS DUPLEX  1/2/2017 10:47 AM     HISTORY: Shortness of breath and right lower  extremity pain.     FINDINGS: The deep veins in the right lower extremity are compressible  throughout. The deep veins demonstrate normal venous augmentation,  waveforms and color Doppler flow.      Impression    IMPRESSION: No evidence of DVT.    HECTOR REYNOLDS MD   Chest XR,  PA & LAT    Narrative    XR CHEST 2 VW 1/2/2017 11:14 AM    COMPARISON: 2/12/2015    HISTORY: Shortness of breath.      Impression    IMPRESSION: Cardiac silhouette and pulmonary vasculature are within  normal limits. No focal airspace disease, pleural effusion or  pneumothorax.    ADAIR MCGHEE

## 2017-01-03 NOTE — PLAN OF CARE
Problem: Goal Outcome Summary  Goal: Goal Outcome Summary  Outcome: No Change  Pt A/O. VSS, Tele SR w/ BBB. Complained of back pain, improved w/ repositioning, declined medication. Voiding in bedside urinal. Fluids infusing at 100/hr. Pt did not get a whole lot of sleep tonight. Will continue to monitor.

## 2017-01-03 NOTE — PLAN OF CARE
Problem: Goal Outcome Summary  Goal: Goal Outcome Summary  Outcome: No Change  A/Ox4. Positive orthostatic BP's; other VSS. Tele: NSR. Reports mild headache, tylenol given x1 w/good relief. Regular diet, fair appetite. Reports nausea and emesis x1, zofran given w/moderate relief. NS infusing at 100 mL/hr. Voiding adequately per urinal at bedside. Up SBA to BR. One BM this shift. Potassium recheck at 1700 was 3.2; repeated K replacement protocol; recheck at 2130 was 3.8. Will continue to monitor.

## 2017-01-04 VITALS
OXYGEN SATURATION: 96 % | DIASTOLIC BLOOD PRESSURE: 82 MMHG | HEART RATE: 73 BPM | HEIGHT: 67 IN | SYSTOLIC BLOOD PRESSURE: 145 MMHG | BODY MASS INDEX: 25.36 KG/M2 | WEIGHT: 161.6 LBS | TEMPERATURE: 97.8 F | RESPIRATION RATE: 16 BRPM

## 2017-01-04 PROBLEM — I95.1 ORTHOSTATIC HYPOTENSION: Status: ACTIVE | Noted: 2017-01-04

## 2017-01-04 LAB
ANION GAP SERPL CALCULATED.3IONS-SCNC: 9 MMOL/L (ref 3–14)
BUN SERPL-MCNC: 15 MG/DL (ref 7–30)
CALCIUM SERPL-MCNC: 8.2 MG/DL (ref 8.5–10.1)
CHLORIDE SERPL-SCNC: 114 MMOL/L (ref 94–109)
CO2 SERPL-SCNC: 23 MMOL/L (ref 20–32)
CREAT SERPL-MCNC: 1.44 MG/DL (ref 0.66–1.25)
ERYTHROCYTE [DISTWIDTH] IN BLOOD BY AUTOMATED COUNT: 21 % (ref 10–15)
GFR SERPL CREATININE-BSD FRML MDRD: 49 ML/MIN/1.7M2
GLUCOSE SERPL-MCNC: 84 MG/DL (ref 70–99)
HCT VFR BLD AUTO: 30.7 % (ref 40–53)
HGB BLD-MCNC: 9.3 G/DL (ref 13.3–17.7)
MCH RBC QN AUTO: 25.8 PG (ref 26.5–33)
MCHC RBC AUTO-ENTMCNC: 30.3 G/DL (ref 31.5–36.5)
MCV RBC AUTO: 85 FL (ref 78–100)
PLATELET # BLD AUTO: 267 10E9/L (ref 150–450)
POTASSIUM SERPL-SCNC: 4.1 MMOL/L (ref 3.4–5.3)
RBC # BLD AUTO: 3.61 10E12/L (ref 4.4–5.9)
SODIUM SERPL-SCNC: 146 MMOL/L (ref 133–144)
WBC # BLD AUTO: 4.5 10E9/L (ref 4–11)

## 2017-01-04 PROCEDURE — 36415 COLL VENOUS BLD VENIPUNCTURE: CPT | Performed by: INTERNAL MEDICINE

## 2017-01-04 PROCEDURE — 25000132 ZZH RX MED GY IP 250 OP 250 PS 637: Performed by: INTERNAL MEDICINE

## 2017-01-04 PROCEDURE — 25000128 H RX IP 250 OP 636: Performed by: INTERNAL MEDICINE

## 2017-01-04 PROCEDURE — 99238 HOSP IP/OBS DSCHRG MGMT 30/<: CPT | Performed by: INTERNAL MEDICINE

## 2017-01-04 PROCEDURE — 80048 BASIC METABOLIC PNL TOTAL CA: CPT | Performed by: INTERNAL MEDICINE

## 2017-01-04 PROCEDURE — 85027 COMPLETE CBC AUTOMATED: CPT | Performed by: INTERNAL MEDICINE

## 2017-01-04 RX ADMIN — SODIUM CHLORIDE: 9 INJECTION, SOLUTION INTRAVENOUS at 00:26

## 2017-01-04 RX ADMIN — ASPIRIN 325 MG ORAL TABLET 325 MG: 325 PILL ORAL at 09:36

## 2017-01-04 NOTE — PLAN OF CARE
Problem: Goal Outcome Summary  Goal: Goal Outcome Summary  Outcome: No Change  VSS. A/Ox4. Denies pain. Tolerating regular diet w/no complaints of n/v this shift. Tele-NSR. NS at 100 mL/hr. Up SBA. Possible d/c tomorrow if creatinine continues trending downward and pt continues to feel better. No other complaints. Will continue to monitor.

## 2017-01-04 NOTE — PLAN OF CARE
Problem: Goal Outcome Summary  Goal: Goal Outcome Summary  Outcome: No Change  Denies pain. Up SBA. Tolerating PO intake. Cr- trending down. Tele NSR. Await d/c orders.

## 2017-01-04 NOTE — PLAN OF CARE
Problem: Goal Outcome Summary  Goal: Goal Outcome Summary  Outcome: No Change  Pt A/O. VSS. Denied pain. Denied nausea and vomiting. Tele NSR w/ BBB. Voiding adequately in bedside urinal. SBA. Pt was able to get some sleep tonight. Will continue to monitor.

## 2017-01-04 NOTE — PLAN OF CARE
Problem: Goal Outcome Summary  Goal: Goal Outcome Summary  Outcome: Adequate for Discharge Date Met:  01/04/17  VSS. A/Ox4. Denies pain. Tolerating regular diet w/no complaints of n/v. Creatinine 1.44. NS infusing at 100 mL/hr. Orders for d/c received. No scripts printed- pt to resume PTA medications. AVS printed and reviewed with patient, who verbalized understanding. Pt discharged in stable condition via wheelchair with all belongings.

## 2017-01-04 NOTE — DISCHARGE SUMMARY
[facility]    Discharge Summary  Hospitalist  Ole Mae MD    Date of Admission:  1/2/2017  Date of Discharge:  1/4/2017  Discharging Provider: Ole Mae  Date of Service (when I saw the patient): 01/04/2017    Discharge Diagnoses  1. Acute on chronic renal failure, due to dehydration.    2. Orthostatic Hypotension, due to alcohol intake, resolved.     History of Present Illness  Vaibhav Crabtree is an 66 year old male, who presented with dizziness and lightheadedness.    Hospital Course  66-year-old male with history of idiopathic cardiomyopathy, coronary artery disease, hypertension and hyperlipidemia who was admitted on 1/2/17, due to dizziness and lightheadedness for 3-4 days. In the ER, the pt had orthostatic hypotension, with his B.P. dropping from 103/65mmHg to 77/47mmHg, with change in position to standing. Work up done on 1/2/17 revealed, CMP significant for K+ 3.2, creat 3.92. CBC revealed, Hb 11, WBC 6.6, Plts 359. D-Dimer 0.3. Initial troponin was negative. BNP was normal. Right lower extremity venous US on 1/2/17 was negative for DVT. Chest x-rays on 1/2/17 was negative.     He was admitted to the medical floor and started on IV N/Saline. His lisinopril and lasix were held on admission. His creatinine improved from 3.92-->2.92-->1.44 on 1/4/17. The patient reported feeling fine and was ambulating with no symptoms. His daughter confided in me that, the patient has a history of heavy alcohol abuse and he restarted drinking again recently. She stated that her dad, had several episodes of vomiting and reduced oral intake prior this admission. On discussing with the patient, he denied taking more than 2 beers a day. He was discharged home and is to follow up with his primary care provider as needed.     Significant Results and Procedures  See above.    Pending Results  None.  Unresulted Labs Ordered in the Past 30 Days of this Admission     No orders found  from 11/4/2016 to 1/3/2017.          Code Status  DNR / DNI       Primary Care Physician  Xander Lee    Physical Exam  Temp: 97.8  F (36.6  C) Temp src: Oral BP: 145/82 mmHg   Heart Rate: 76 Resp: 16 SpO2: 96 % O2 Device: None (Room air)    Filed Vitals:    01/02/17 0949 01/02/17 1224   Weight: 73.483 kg (162 lb) 73.301 kg (161 lb 9.6 oz)     Constitutional: Adult white male, awake, cooperative, no apparent distress, O2 Sats 96% on RA  Respiratory: BS vesicular bilaterally, no crackles or wheezing  Cardiovascular: S1 and S2, reg, no murmur noted  GI: Soft, non-distended, non-tender, no masses, BS present+  Skin/Integumen: No rashes  Extremities: No pedal edema      Discharge Disposition  Discharged to home  Condition at discharge: Stable    Consultations This Hospital Stay  None    Time Spent on This Encounter  I, Ole Mae, personally saw the patient today and spent less than or equal to 30 minutes discharging this patient.    Discharge Orders  No discharge procedures on file.  Discharge Medications  Current Discharge Medication List      CONTINUE these medications which have NOT CHANGED    Details   Acetaminophen (TYLENOL PO) Take 650 mg by mouth every 4 hours as needed for mild pain or fever      lisinopril (PRINIVIL,ZESTRIL) 20 MG tablet Take 1 tablet (20 mg) by mouth daily  Qty: 90 tablet, Refills: 1    Associated Diagnoses: Essential hypertension, hypertension with unspecified goal      carvedilol (COREG) 25 MG tablet Take 1 tablet (25 mg) by mouth 2 times daily (with meals)  Qty: 180 tablet, Refills: 4    Associated Diagnoses: Essential hypertension; Chronic combined systolic and diastolic congestive heart failure (H)      furosemide (LASIX) 20 MG tablet Take 1 tablet (20 mg) by mouth daily as needed Hold until instructed to resume by primary care provider.  Qty: 30 tablet, Refills: 12    Associated Diagnoses: Idiopathic cardiomyopathy (H)      aspirin 325 MG tablet Take 325 mg by mouth daily       order for DME Equipment being ordered: hinged knee brace, large  Qty: 1 Device, Refills: 0    Associated Diagnoses: Primary localized osteoarthrosis, lower leg, right; Instability of knee joint, right           Allergies  Allergies   Allergen Reactions     No Known Allergies      Data  Most Recent 3 CBC's:  Recent Labs   Lab Test  01/04/17   0704  01/03/17   0725  01/02/17   0956   WBC  4.5  4.8  6.6   HGB  9.3*  9.9*  11.0*   MCV  85  84  83   PLT  267  288  359      Most Recent 3 BMP's:  Recent Labs   Lab Test  01/04/17   0704  01/03/17   0725  01/02/17   2125   01/02/17   0956   NA  146*  143   --    --   137   POTASSIUM  4.1  3.7  3.8   < >  3.2*   CHLORIDE  114*  108   --    --   98   CO2  23  24   --    --   26   BUN  15  26   --    --   29   CR  1.44*  2.92*   --    --   3.92*   ANIONGAP  9  11   --    --   13   ALCIDES  8.2*  7.5*   --    --   8.5   GLC  84  112*   --    --   114*    < > = values in this interval not displayed.     Most Recent 2 LFT's:  Recent Labs   Lab Test  01/02/17   0956  09/08/16   0736   AST  42  12   ALT  28  10   ALKPHOS  105  61   BILITOTAL  1.2  0.3     Most Recent INR's and Anticoagulation Dosing History:        Anticoagulation Dose History     Recent Dosing and Labs Latest Ref Rng 2/12/2015 2/12/2015 9/8/2016    INR 0.86 - 1.14 Unsatisfactory specimen - tube underfilled 1.02 1.07        Most Recent 3 Troponin's:  Recent Labs   Lab Test  01/02/17   0956  09/08/16   0736  02/12/15   1730   TROPI  <0.015  The 99th percentile for upper reference range is 0.045 ug/L.  Troponin values in   the range of 0.045 - 0.120 ug/L may be associated with risks of adverse   clinical events.    <0.015  The 99th percentile for upper reference range is 0.045 ug/L.  Troponin values in   the range of 0.045 - 0.120 ug/L may be associated with risks of adverse   clinical events.    <0.015  The 99th percentile for upper reference range is 0.045 ug/L.  Troponin values in   the range of 0.045 - 0.120 ug/L  may be associated with risks of adverse   clinical events.   Effective 7/30/2014, the reference range for this assay has changed to reflect   new instrumentation/methodology.       Most Recent Cholesterol Panel:  Recent Labs   Lab Test  01/12/15   0525   CHOL  199   LDL  96   HDL  77   TRIG  130     Most Recent 6 Bacteria Isolates From Any Culture (See EPIC Reports for Culture Details):No lab results found.  Most Recent TSH, T4 and A1c Labs:  Recent Labs   Lab Test  01/11/15   1240  05/27/14   0255   TSH  1.90  6.58*   T4   --   1.28       Results for orders placed or performed during the hospital encounter of 01/02/17    Lower Extremity Venous Duplex Right    Narrative    ULTRASOUND RIGHT LOWER EXTREMITY VENOUS DUPLEX  1/2/2017 10:47 AM     HISTORY: Shortness of breath and right lower extremity pain.     FINDINGS: The deep veins in the right lower extremity are compressible  throughout. The deep veins demonstrate normal venous augmentation,  waveforms and color Doppler flow.      Impression    IMPRESSION: No evidence of DVT.    HECTOR REYNOLDS MD   Chest XR,  PA & LAT    Narrative    XR CHEST 2 VW 1/2/2017 11:14 AM    COMPARISON: 2/12/2015    HISTORY: Shortness of breath.      Impression    IMPRESSION: Cardiac silhouette and pulmonary vasculature are within  normal limits. No focal airspace disease, pleural effusion or  pneumothorax.    ADAIR MCGHEE

## 2017-01-05 ENCOUNTER — TELEPHONE (OUTPATIENT)
Dept: FAMILY MEDICINE | Facility: CLINIC | Age: 67
End: 2017-01-05

## 2017-01-05 NOTE — TELEPHONE ENCOUNTER
Pt was discharged from [facility] on 1/4/17 after being treated for acute renal failure. Please call the pt. Thank you.  Alla Hawkins,

## 2017-01-05 NOTE — TELEPHONE ENCOUNTER
Forms received from: [employer]   Phone number listed: [phone]   Fax listed: [phone]  Date received: January 5, 2017  Form description: Short Term Disability Claim  Once forms are completed, please return to tc via folder.  Is patient requesting to be contacted when forms are completed: n  Phone: na  Form placed: MD/CNP bin For Review/Signature    Columba HASSAN

## 2017-01-05 NOTE — TELEPHONE ENCOUNTER
ED / Discharge Outreach Protocol    Patient Contact    Attempt # 1    Was call answered?  No.  Left message on voicemail with information to call me back.    Next 5 appointments (look out 90 days)     Sergei 10, 2017 10:00 AM   [place] Xander Lee [place]   [facility] [place]    [address] [place] [id number]   [phone]                Skye Wilkerson RN

## 2017-01-06 NOTE — TELEPHONE ENCOUNTER
ED / Discharge Outreach Protocol    Patient Contact    Attempt # 2    Was call answered?  No.  Left message on voicemail with information to call me back.  Daria Gamboa RN  [employer]

## 2017-01-07 ENCOUNTER — TELEPHONE (OUTPATIENT)
Dept: NURSING | Facility: CLINIC | Age: 67
End: 2017-01-07

## 2017-01-07 NOTE — TELEPHONE ENCOUNTER
Call Type: Triage Call    Presenting Problem: Patient returning clinic call. He was  hospitalized for kidney failure.  Triage Note:  Guideline Title: Information Only Call; No Symptom Triage (Adult)  Recommended Disposition: Call Provider When Office is Open  Original Inclination: Would have called clinic  Override Disposition:  Intended Action: Call PCP/HCP  Physician Contacted: No  Requesting information and provider is best resource; no triage required. ?  YES  Requesting regular office appointment ? NO  Sign(s) or symptom(s) associated with a diagnosed condition or with a new illness  ? NO  Requesting information about provider, services or community resources ? NO  Call back to complete assessment/clarification of information from prior caller to  complete triage ? NO  Physician Instructions:  Care Advice:

## 2017-01-09 NOTE — TELEPHONE ENCOUNTER
"Hospital/TCU/ED for chronic condition Discharge Protocol    \"Hi, my name is Selam Oates, a registered nurse, and I am calling from [facility].  I am calling to follow up and see how things are going for you after your recent emergency visit/hospital/TCU stay.\"    Tell me how you are doing now that you are home?\" when he wakes up he has new onset headaches (no vision changes, no dizziness) and lower back ache, takes Advil and resolves.  Will be discussing with Dr. Lee at follow up appt tomorrow.       Discharge Instructions    \"Let's review your discharge instructions.  What is/are the follow-up recommendations?  Pt. Response: take it easy, see PCP, off work     \"Has an appointment with your primary care provider been scheduled?\"   Yes. (confirm) 1/10/17    \"When you see the provider, I would recommend that you bring your medications with you.\"    Medications    \"Tell me what changed about your medicines when you discharged?\"    Changes to chronic meds?    0-1    \"What questions do you have about your medications?\"    None, off BP meds in hospital (Hypotension on admission) and resumed once discharged     New diagnoses of heart failure, COPD, diabetes, or MI?    No              Medication reconciliation completed? No  Was MTM referral placed (*Make sure to put transitions as reason for referral)?   No    Call Summary    \"What questions or concerns do you have about your recent visit and your follow-up care?\"     Continuing using Advil, avoid heavy lifting, stay hydrated.      \"If you have questions or things don't continue to improve, we encourage you contact us through the main clinic number (give number).  Even if the clinic is not open, triage nurses are available 24/7 to help you.     We would like you to know that our clinic has extended hours (provide information).  We also have urgent care (provide details on closest location and hours/contact info)\"      \"Thank you for your time and take " "care!\"             "

## 2017-01-10 ENCOUNTER — OFFICE VISIT (OUTPATIENT)
Dept: FAMILY MEDICINE | Facility: CLINIC | Age: 67
End: 2017-01-10
Payer: COMMERCIAL

## 2017-01-10 VITALS
TEMPERATURE: 97.7 F | BODY MASS INDEX: 27.31 KG/M2 | HEIGHT: 67 IN | DIASTOLIC BLOOD PRESSURE: 92 MMHG | HEART RATE: 70 BPM | WEIGHT: 174 LBS | SYSTOLIC BLOOD PRESSURE: 162 MMHG

## 2017-01-10 DIAGNOSIS — N17.9 AKI (ACUTE KIDNEY INJURY) (H): Primary | ICD-10-CM

## 2017-01-10 DIAGNOSIS — I42.9 IDIOPATHIC CARDIOMYOPATHY (H): ICD-10-CM

## 2017-01-10 DIAGNOSIS — I50.42 CHRONIC COMBINED SYSTOLIC AND DIASTOLIC CONGESTIVE HEART FAILURE (H): ICD-10-CM

## 2017-01-10 DIAGNOSIS — I25.10 CORONARY ARTERY DISEASE INVOLVING NATIVE CORONARY ARTERY OF NATIVE HEART WITHOUT ANGINA PECTORIS: ICD-10-CM

## 2017-01-10 LAB
ANION GAP SERPL CALCULATED.3IONS-SCNC: 6 MMOL/L (ref 3–14)
BUN SERPL-MCNC: 10 MG/DL (ref 7–30)
CALCIUM SERPL-MCNC: 8.5 MG/DL (ref 8.5–10.1)
CHLORIDE SERPL-SCNC: 109 MMOL/L (ref 94–109)
CO2 SERPL-SCNC: 26 MMOL/L (ref 20–32)
CREAT SERPL-MCNC: 1.44 MG/DL (ref 0.66–1.25)
GFR SERPL CREATININE-BSD FRML MDRD: 49 ML/MIN/1.7M2
GLUCOSE SERPL-MCNC: 91 MG/DL (ref 70–99)
NT-PROBNP SERPL-MCNC: 1299 PG/ML (ref 0–125)
POTASSIUM SERPL-SCNC: 4 MMOL/L (ref 3.4–5.3)
SODIUM SERPL-SCNC: 141 MMOL/L (ref 133–144)

## 2017-01-10 PROCEDURE — 80048 BASIC METABOLIC PNL TOTAL CA: CPT | Performed by: FAMILY MEDICINE

## 2017-01-10 PROCEDURE — 83880 ASSAY OF NATRIURETIC PEPTIDE: CPT | Performed by: FAMILY MEDICINE

## 2017-01-10 PROCEDURE — 99495 TRANSJ CARE MGMT MOD F2F 14D: CPT | Performed by: FAMILY MEDICINE

## 2017-01-10 PROCEDURE — 36415 COLL VENOUS BLD VENIPUNCTURE: CPT | Performed by: FAMILY MEDICINE

## 2017-01-10 RX ORDER — FUROSEMIDE 20 MG
20 TABLET ORAL DAILY PRN
Qty: 30 TABLET | Refills: 12
Start: 2017-01-10 | End: 2017-01-16

## 2017-01-10 NOTE — PROGRESS NOTES
SUBJECTIVE:                                                    Vaibhav Crabtree is a 66 year old male who presents to clinic today for the following health issues:          Hospital Follow-up Visit:    Hospital/Nursing Home/IP Rehab Facility: [facility]  Date of Admission: 1/2/17  Date of Discharge: 1/4/17  Reason(s) for Admission: Kidney failure dizziness            Problems taking medications regularly:  None       Medication changes since discharge: None       Problems adhering to non-medication therapy:  None    Summary of hospitalization:  [facility] discharge summary reviewed  Diagnostic Tests/Treatments reviewed.  Follow up needed: none  Other Healthcare Providers Involved in Patient s Care:         None  Update since discharge: not improving     Post Discharge Medication Reconciliation: discharge medications reconciled, continue medications without change.  Plan of care communicated with patient     Coding guidelines for this visit:  Type of Medical   Decision Making Face-to-Face Visit       within 7 Days of discharge Face-to-Face Visit        within 14 days of discharge   Moderate Complexity [id number]   High Complexity [id number]                Problem list and histories reviewed & adjusted, as indicated.  Additional history: as documented    Patient Active Problem List   Diagnosis     Dyspnea and respiratory abnormality     Impotence of organic origin     Adjustment reaction     CARDIOVASCULAR SCREENING; LDL GOAL LESS THAN 130     Pain, joint, knee, right     Advanced directives, counseling/discussion     Arthritis of knee, right     Unstable angina (H)     Idiopathic cardiomyopathy (H)     Congestive heart failure (H)     Hypertension     Hyperlipidemia     CAD (coronary artery disease)     Mitral regurgitation     Atypical chest pain     Claudication of right lower extremity (H)     RAYSHAWN (acute kidney injury) (H)     Orthostatic hypotension     Past Surgical History    Procedure Laterality Date     Hc repair rotator cuff,acute  02 and 03     left rotator cuff repair     Hc removal of tonsils,<11 y/o       Tonsils <10y.o.     C nonspecific procedure  2002     ulnar nerve     C nonspecific procedure       right rotator cuff repair       Social History   Substance Use Topics     Smoking status: Former Smoker -- 4.00 packs/day for 15 years     Types: Cigarettes     Quit date: 06/20/1971     Smokeless tobacco: Never Used     Alcohol Use: Yes      Comment: 1 every other day     Family History   Problem Relation Age of Onset     Connective Tissue Disorder Paternal Grandfather      Depression Daughter      Eye Disorder Son      Respiratory Father      COPD     Unknown/Adopted Mother      CANCER Brother      CANCER Brother          Current Outpatient Prescriptions   Medication Sig Dispense Refill     Acetaminophen (TYLENOL PO) Take 650 mg by mouth every 4 hours as needed for mild pain or fever       lisinopril (PRINIVIL,ZESTRIL) 20 MG tablet Take 1 tablet (20 mg) by mouth daily 90 tablet 1     carvedilol (COREG) 25 MG tablet Take 1 tablet (25 mg) by mouth 2 times daily (with meals) 180 tablet 4     furosemide (LASIX) 20 MG tablet Take 1 tablet (20 mg) by mouth daily as needed Hold until instructed to resume by primary care provider. 30 tablet 12     order for DME Equipment being ordered: hinged knee brace, large 1 Device 0     aspirin 325 MG tablet Take 325 mg by mouth daily       Allergies   Allergen Reactions     No Known Allergies      Recent Labs   Lab Test  01/04/17   0704  01/03/17   0725   01/02/17   0956  09/08/16   0736   02/12/15   1730  01/12/15   0525  01/11/15   1240   05/28/14   0605   05/27/14   0255   LDL   --    --    --    --    --    --    --   96   --    --   69   --    --    HDL   --    --    --    --    --    --    --   77   --    --   109   --    --    TRIG   --    --    --    --    --    --    --   130   --    --   130   --    --    ALT   --    --    --   28  10    "--   29  34  43   --    --    --    --    CR  1.44*  2.92*   --   3.92*  1.31*   < >  2.17*  1.13  1.21   < >   --    < >  1.11   GFRESTIMATED  49*  22*   --   15*  54*   < >  31*  65  60*   < >   --    < >  66   GFRESTBLACK  59*  26*   --   19*  66   < >  37*  78  72   < >   --    < >  80   POTASSIUM  4.1  3.7   < >  3.2*  3.2*   < >  3.6  4.3  4.0   < >  3.7   < >   --    TSH   --    --    --    --    --    --    --    --   1.90   --    --    --   6.58*    < > = values in this interval not displayed.      BP Readings from Last 3 Encounters:   01/10/17 162/92   01/04/17 145/82   12/21/16 128/80    Wt Readings from Last 3 Encounters:   01/10/17 174 lb (78.926 kg)   01/02/17 161 lb 9.6 oz (73.301 kg)   12/21/16 168 lb (76.204 kg)               ROS:  Constitutional, HEENT, cardiovascular, pulmonary, gi and gu systems are negative, except as otherwise noted.    OBJECTIVE:                                                    /92 mmHg  Pulse 70  Temp(Src) 97.7  F (36.5  C) (Tympanic)  Ht 5' 7\" (1.702 m)  Wt 174 lb (78.926 kg)  BMI 27.25 kg/m2  Body mass index is 27.25 kg/(m^2).  GENERAL: healthy, alert and no distress  NECK: no adenopathy, no asymmetry, masses, or scars and thyroid normal to palpation  RESP: lungs clear to auscultation - no rales, rhonchi or wheezes  CV: regular rate and rhythm, normal S1 S2, no S3 or S4, no murmur, click or rub, no peripheral edema and peripheral pulses strong  ABDOMEN: soft, nontender, no hepatosplenomegaly, no masses and bowel sounds normal  MS: no gross musculoskeletal defects noted, no edema         ASSESSMENT/PLAN:                                                    Vaibhav was seen today for hospital f/u.    Diagnoses and all orders for this visit:    RAYSHAWN (acute kidney injury) (H)  -     Basic metabolic panel  (Ca, Cl, CO2, Creat, Gluc, K, Na, BUN)  -     BNP-N terminal pro    Chronic combined systolic and diastolic congestive heart failure (H)  -     Basic metabolic panel "  (Ca, Cl, CO2, Creat, Gluc, K, Na, BUN)  -     BNP-N terminal pro    Coronary artery disease involving native coronary artery of native heart without angina pectoris      Orthostatic hypotension and RAYSHAWN related with recent alcohol drinking, has been off of lasix after admission.   However, pt started having gaining wt/dizziness again/mild SOB. His BP is elevated as well. Pt stated he hasn't been drinking after admission  Will recheck lab and consider resume diuretics(furosemide vs torsemide?)  Work excuse letter written(off work until 1-16-17, and needed f/u assessment )    Xander Lee MD  [facility] [place]

## 2017-01-10 NOTE — Clinical Note
[facility]  [address] [address]  Phone: [phone]    January 10, 2017        Vaibhav Crabtree  [address] [address]          To whom it may concern:    RE: Vaibhav Crabtree    Patient was seen and treated today at our clinic and missed work.  Patient may not return to work until Monday, 1-16-17 due to current health status. We will have him to return to clinic for follow-up assessment at 1-16-17 to consider releasing to work.      Sincerely,        Xander Lee MD

## 2017-01-10 NOTE — MR AVS SNAPSHOT
After Visit Summary   1/10/2017    Vaibhav Crabtree    MRN: [id number]           Patient Information     Date Of Birth          1950        Visit Information        Provider Department      1/10/2017 10:00 AM Xander Lee MD [facility]        Today's Diagnoses     RAYSHAWN (acute kidney injury) (H)    -  1     Chronic combined systolic and diastolic congestive heart failure (H)         Coronary artery disease involving native coronary artery of native heart without angina pectoris            Follow-ups after your visit        Your next 10 appointments already scheduled     Sergei 10, 2017 10:00 AM   Office Visit with Xander Lee MD   [facility] ([facility])    [address] [place] [id number] [phone]           Bring a current list of meds and any records pertaining to this visit.  For Physicals, please bring immunization records and any forms needing to be filled out.  Please arrive 10 minutes early to complete paperwork.              Who to contact     If you have questions or need follow up information about today's clinic visit or your schedule please contact [facility] directly at [phone].  Normal or non-critical lab and imaging results will be communicated to you by MyChart, letter or phone within 4 business days after the clinic has received the results. If you do not hear from us within 7 days, please contact the clinic through Gamy[employer] or phone. If you have a critical or abnormal lab result, we will notify you by phone as soon as possible.  Submit refill requests through eRelyx or call your pharmacy and they will forward the refill request to us. Please allow 3 business days for your refill to be completed.          Additional Information About Your Visit        MyChart Information     eRelyx lets you send messages to your doctor, view your test results, renew your prescriptions, schedule  "appointments and more. To sign up, go to [email or web address] . Click on \"Log in\" on the left side of the screen, which will take you to the Welcome page. Then click on \"Sign up Now\" on the right side of the page.     You will be asked to enter the access code listed below, as well as some personal information. Please follow the directions to create your username and password.     Your access code is: [email or web address]  Expires: 3/21/2017 10:09 AM     Your access code will  in 90 days. If you need help or a new code, please call your [place] clinic or [phone].        Care EveryWhere ID     This is your Care EveryWhere ID. This could be used by other organizations to access your [place] medical records  [id number]        Your Vitals Were     Pulse Temperature Height BMI (Body Mass Index)          70 97.7  F (36.5  C) (Tympanic) 5' 7\" (1.702 m) 27.25 kg/m2         Blood Pressure from Last 3 Encounters:   01/10/17 162/92   17 145/82   16 128/80    Weight from Last 3 Encounters:   01/10/17 174 lb (78.926 kg)   17 161 lb 9.6 oz (73.301 kg)   16 168 lb (76.204 kg)              We Performed the Following     Basic metabolic panel  (Ca, Cl, CO2, Creat, Gluc, K, Na, BUN)     BNP-N terminal pro        Primary Care Provider Office Phone # Fax #    Kjv S MD Jesus [phone]       [address] [address]        Thank you!     Thank you for choosing [facility]  for your care. Our goal is always to provide you with excellent care. Hearing back from our patients is one way we can continue to improve our services. Please take a few minutes to complete the written survey that you may receive in the mail after your visit with us. Thank you!             Your Updated Medication List - Protect others around you: Learn how to safely use, store and throw away your medicines at www.disposemymeds.org.          This list is " accurate as of: 1/10/17  9:57 AM.  Always use your most recent med list.                   Brand Name Dispense Instructions for use    aspirin 325 MG tablet      Take 325 mg by mouth daily       carvedilol 25 MG tablet    COREG    180 tablet    Take 1 tablet (25 mg) by mouth 2 times daily (with meals)       furosemide 20 MG tablet    LASIX    30 tablet    Take 1 tablet (20 mg) by mouth daily as needed Hold until instructed to resume by primary care provider.       lisinopril 20 MG tablet    PRINIVIL/ZESTRIL    90 tablet    Take 1 tablet (20 mg) by mouth daily       order for DME     1 Device    Equipment being ordered: hinged knee brace, large       TYLENOL PO      Take 650 mg by mouth every 4 hours as needed for mild pain or fever

## 2017-01-10 NOTE — NURSING NOTE
"Chief Complaint   Patient presents with     Hospital F/U       Initial /92 mmHg  Pulse 70  Temp(Src) 97.7  F (36.5  C) (Tympanic)  Ht 5' 7\" (1.702 m)  Wt 174 lb (78.926 kg)  BMI 27.25 kg/m2 Estimated body mass index is 27.25 kg/(m^2) as calculated from the following:    Height as of this encounter: 5' 7\" (1.702 m).    Weight as of this encounter: 174 lb (78.926 kg).  BP completed using cuff size: kate Villa CMA    "

## 2017-01-16 ENCOUNTER — OFFICE VISIT (OUTPATIENT)
Dept: FAMILY MEDICINE | Facility: CLINIC | Age: 67
End: 2017-01-16
Payer: COMMERCIAL

## 2017-01-16 ENCOUNTER — RADIANT APPOINTMENT (OUTPATIENT)
Dept: GENERAL RADIOLOGY | Facility: CLINIC | Age: 67
End: 2017-01-16
Attending: FAMILY MEDICINE
Payer: COMMERCIAL

## 2017-01-16 VITALS
HEIGHT: 67 IN | TEMPERATURE: 97.2 F | SYSTOLIC BLOOD PRESSURE: 138 MMHG | BODY MASS INDEX: 27.31 KG/M2 | DIASTOLIC BLOOD PRESSURE: 84 MMHG | WEIGHT: 174 LBS | OXYGEN SATURATION: 97 % | HEART RATE: 87 BPM

## 2017-01-16 DIAGNOSIS — I95.1 ORTHOSTATIC HYPOTENSION: ICD-10-CM

## 2017-01-16 DIAGNOSIS — I42.9 IDIOPATHIC CARDIOMYOPATHY (H): ICD-10-CM

## 2017-01-16 DIAGNOSIS — I50.32 CHRONIC DIASTOLIC CONGESTIVE HEART FAILURE (H): ICD-10-CM

## 2017-01-16 DIAGNOSIS — R42 DIZZINESS: Primary | ICD-10-CM

## 2017-01-16 DIAGNOSIS — R42 DIZZINESS: ICD-10-CM

## 2017-01-16 PROCEDURE — 71020 XR CHEST 2 VW: CPT

## 2017-01-16 PROCEDURE — 99214 OFFICE O/P EST MOD 30 MIN: CPT | Performed by: FAMILY MEDICINE

## 2017-01-16 RX ORDER — FUROSEMIDE 20 MG
20 TABLET ORAL DAILY PRN
Qty: 30 TABLET | Refills: 12
Start: 2017-01-16 | End: 2017-01-22

## 2017-01-16 NOTE — MR AVS SNAPSHOT
"              After Visit Summary   1/16/2017    Vaibhav Crabtree    MRN: [id number]           Patient Information     Date Of Birth          1950        Visit Information        Provider Department      1/16/2017 9:40 AM Xander Lee MD [facility]        Today's Diagnoses     Dizziness    -  1     Idiopathic cardiomyopathy (H)         Orthostatic hypotension         Chronic diastolic congestive heart failure (H)            Follow-ups after your visit        Future tests that were ordered for you today     Open Future Orders        Priority Expected Expires Ordered    XR Chest 2 Views Routine 1/16/2017 1/16/2018 1/16/2017            Who to contact     If you have questions or need follow up information about today's clinic visit or your schedule please contact [facility] directly at [phone].  Normal or non-critical lab and imaging results will be communicated to you by MyChart, letter or phone within 4 business days after the clinic has received the results. If you do not hear from us within 7 days, please contact the clinic through MyChart or phone. If you have a critical or abnormal lab result, we will notify you by phone as soon as possible.  Submit refill requests through Globial or call your pharmacy and they will forward the refill request to us. Please allow 3 business days for your refill to be completed.          Additional Information About Your Visit        Liebohart Information     Globial lets you send messages to your doctor, view your test results, renew your prescriptions, schedule appointments and more. To sign up, go to [email or web address] . Click on \"Log in\" on the left side of the screen, which will take you to the Welcome page. Then click on \"Sign up Now\" on the right side of the page.     You will be asked to enter the access code listed below, as well as some personal information. Please follow the directions to create your username and " "password.     Your access code is: [email or web address]  Expires: 3/21/2017 10:09 AM     Your access code will  in 90 days. If you need help or a new code, please call your [place] clinic or [phone].        Care EveryWhere ID     This is your Care EveryWhere ID. This could be used by other organizations to access your [place] medical records  [id number]        Your Vitals Were     Pulse Temperature Height BMI (Body Mass Index) Pulse Oximetry       87 97.2  F (36.2  C) (Tympanic) 5' 7\" (1.702 m) 27.25 kg/m2 97%        Blood Pressure from Last 3 Encounters:   17 138/84   01/10/17 162/92   17 145/82    Weight from Last 3 Encounters:   17 174 lb (78.926 kg)   01/10/17 174 lb (78.926 kg)   17 161 lb 9.6 oz (73.301 kg)                 Where to get your medicines      Some of these will need a paper prescription and others can be bought over the counter.  Ask your nurse if you have questions.     You don't need a prescription for these medications    - furosemide 20 MG tablet       Primary Care Provider Office Phone # Fax #    Xander Lee [phone]       [address] [address]        Thank you!     Thank you for choosing [facility]  for your care. Our goal is always to provide you with excellent care. Hearing back from our patients is one way we can continue to improve our services. Please take a few minutes to complete the written survey that you may receive in the mail after your visit with us. Thank you!             Your Updated Medication List - Protect others around you: Learn how to safely use, store and throw away your medicines at www.disposemymeds.org.          This list is accurate as of: 17 10:01 AM.  Always use your most recent med list.                   Brand Name Dispense Instructions for use    aspirin 325 MG tablet      Take 325 mg by mouth daily       carvedilol 25 MG tablet    COREG    " 180 tablet    Take 1 tablet (25 mg) by mouth 2 times daily (with meals)       furosemide 20 MG tablet    LASIX    30 tablet    Take 1 tablet (20 mg) by mouth daily as needed       lisinopril 20 MG tablet    PRINIVIL/ZESTRIL    90 tablet    Take 1 tablet (20 mg) by mouth daily       order for DME     1 Device    Equipment being ordered: hinged knee brace, large       TYLENOL PO      Take 650 mg by mouth every 4 hours as needed for mild pain or fever

## 2017-01-16 NOTE — Clinical Note
[facility]  [address] [place] [address]  Phone: [phone]    January 16, 2017        Vaibhav Crabtree  [address] [address]          To whom it may concern:    RE: Vaibhav Crabtree    Patient was seen and treated today at our clinic and missed work.  Patient still has no improvement on his current symptom, may not return to work until Friday, 1-20-17 due to current health status. We will have him to return to clinic for follow-up assessment at 1-20-17 to consider releasing to work.    Please contact me for questions or concerns.      Sincerely,        Xander Lee MD

## 2017-01-16 NOTE — PROGRESS NOTES
SUBJECTIVE:                                                    Vaibhav Crabtree is a 66 year old male who presents to clinic today for the following health issues:      Concern - recheck      Onset: ongoing     Description:   Headaches and dizziness recheck from last week bp has been running high    Intensity:     Progression of Symptoms:      Accompanying Signs & Symptoms:         Previous history of similar problem:       Precipitating factors:   Worsened by:     Alleviating factors:  Improved by:        Therapies Tried and outcome:       Problem list and histories reviewed & adjusted, as indicated.  Additional history: as documented    Patient Active Problem List   Diagnosis     Dyspnea and respiratory abnormality     Impotence of organic origin     Adjustment reaction     CARDIOVASCULAR SCREENING; LDL GOAL LESS THAN 130     Pain, joint, knee, right     Advanced directives, counseling/discussion     Arthritis of knee, right     Unstable angina (H)     Idiopathic cardiomyopathy (H)     Congestive heart failure (H)     Hypertension     Hyperlipidemia     CAD (coronary artery disease)     Mitral regurgitation     Atypical chest pain     Claudication of right lower extremity (H)     RAYSHAWN (acute kidney injury) (H)     Orthostatic hypotension     Past Surgical History   Procedure Laterality Date     Hc repair rotator cuff,acute  02 and 03     left rotator cuff repair     Hc removal of tonsils,<9 y/o       Tonsils <10y.o.     C nonspecific procedure  2002     ulnar nerve     C nonspecific procedure       right rotator cuff repair       Social History   Substance Use Topics     Smoking status: Former Smoker -- 4.00 packs/day for 15 years     Types: Cigarettes     Quit date: 06/20/1971     Smokeless tobacco: Never Used     Alcohol Use: Yes      Comment: 1 every other day     Family History   Problem Relation Age of Onset     Connective Tissue Disorder Paternal Grandfather      Depression Daughter      Eye Disorder Son       Respiratory Father      COPD     Unknown/Adopted Mother      CANCER Brother      CANCER Brother          Current Outpatient Prescriptions   Medication Sig Dispense Refill     furosemide (LASIX) 20 MG tablet Take 1 tablet (20 mg) by mouth daily as needed 30 tablet 12     Acetaminophen (TYLENOL PO) Take 650 mg by mouth every 4 hours as needed for mild pain or fever       lisinopril (PRINIVIL,ZESTRIL) 20 MG tablet Take 1 tablet (20 mg) by mouth daily 90 tablet 1     carvedilol (COREG) 25 MG tablet Take 1 tablet (25 mg) by mouth 2 times daily (with meals) 180 tablet 4     order for DME Equipment being ordered: hinged knee brace, large 1 Device 0     aspirin 325 MG tablet Take 325 mg by mouth daily       [DISCONTINUED] furosemide (LASIX) 20 MG tablet Take 1 tablet (20 mg) by mouth daily as needed 30 tablet 12     Allergies   Allergen Reactions     No Known Allergies      Recent Labs   Lab Test  01/10/17   0953  01/04/17   0704   01/02/17   0956  09/08/16   0736   02/12/15   1730  01/12/15   0525  01/11/15   1240   05/28/14   0605   05/27/14   0255   LDL   --    --    --    --    --    --    --   96   --    --   69   --    --    HDL   --    --    --    --    --    --    --   77   --    --   109   --    --    TRIG   --    --    --    --    --    --    --   130   --    --   130   --    --    ALT   --    --    --   28  10   --   29  34  43   --    --    --    --    CR  1.44*  1.44*   < >  3.92*  1.31*   < >  2.17*  1.13  1.21   < >   --    < >  1.11   GFRESTIMATED  49*  49*   < >  15*  54*   < >  31*  65  60*   < >   --    < >  66   GFRESTBLACK  59*  59*   < >  19*  66   < >  37*  78  72   < >   --    < >  80   POTASSIUM  4.0  4.1   < >  3.2*  3.2*   < >  3.6  4.3  4.0   < >  3.7   < >   --    TSH   --    --    --    --    --    --    --    --   1.90   --    --    --   6.58*    < > = values in this interval not displayed.      BP Readings from Last 3 Encounters:   01/16/17 138/84   01/10/17 162/92   01/04/17 145/82    Wt  "Readings from Last 3 Encounters:   01/16/17 174 lb (78.926 kg)   01/10/17 174 lb (78.926 kg)   01/02/17 161 lb 9.6 oz (73.301 kg)                    ROS:  Constitutional, HEENT, cardiovascular, pulmonary, gi and gu systems are negative, except as otherwise noted.    OBJECTIVE:                                                    /84 mmHg  Pulse 87  Temp(Src) 97.2  F (36.2  C) (Tympanic)  Ht 5' 7\" (1.702 m)  Wt 174 lb (78.926 kg)  BMI 27.25 kg/m2  SpO2 97%  Body mass index is 27.25 kg/(m^2).  GENERAL: healthy, alert and no distress  NECK: no adenopathy, no asymmetry, masses, or scars and thyroid normal to palpation  RESP: lungs clear to auscultation - no rales, rhonchi or wheezes  CV: regular rate and rhythm, normal S1 S2, no S3 or S4, no murmur, click or rub, no peripheral edema and peripheral pulses strong  ABDOMEN: soft, nontender, no hepatosplenomegaly, no masses and bowel sounds normal  MS: no gross musculoskeletal defects noted, no edema         ASSESSMENT/PLAN:                                                    Vaibhav was seen today for recheck.    Diagnoses and all orders for this visit:    Dizziness  -     XR Chest 2 Views; Future    Idiopathic cardiomyopathy (H)  -     furosemide (LASIX) 20 MG tablet; Take 1 tablet (20 mg) by mouth daily as needed  -     XR Chest 2 Views; Future    Orthostatic hypotension  -     XR Chest 2 Views; Future    Chronic diastolic congestive heart failure (H)  -     furosemide (LASIX) 20 MG tablet; Take 1 tablet (20 mg) by mouth daily as needed  -     XR Chest 2 Views; Future      Still has dizziness and SOB, been taking lasix 20mg, last lab test showed stable kidney and electrolyte  Will have him to double up the dose of lasix for nest 4-5 days and RTC for f/u    FUTURE APPOINTMENTS:       - Follow-up visit in 5 days     Xander Lee MD  [facility]    "

## 2017-01-16 NOTE — NURSING NOTE
"Chief Complaint   Patient presents with     RECHECK       Initial /84 mmHg  Pulse 87  Temp(Src) 97.2  F (36.2  C) (Tympanic)  Ht 5' 7\" (1.702 m)  Wt 174 lb (78.926 kg)  BMI 27.25 kg/m2  SpO2 97% Estimated body mass index is 27.25 kg/(m^2) as calculated from the following:    Height as of this encounter: 5' 7\" (1.702 m).    Weight as of this encounter: 174 lb (78.926 kg).  BP completed using cuff size: kate Villa CMA    "

## 2017-01-17 ENCOUNTER — TELEPHONE (OUTPATIENT)
Dept: FAMILY MEDICINE | Facility: CLINIC | Age: 67
End: 2017-01-17

## 2017-01-17 NOTE — TELEPHONE ENCOUNTER
Form completed and faxed to [employer]. Copy sent to abstraction and original mailed to the pt.  Alla Hawkins,

## 2017-01-20 ENCOUNTER — OFFICE VISIT (OUTPATIENT)
Dept: FAMILY MEDICINE | Facility: CLINIC | Age: 67
End: 2017-01-20
Payer: COMMERCIAL

## 2017-01-20 VITALS
HEART RATE: 72 BPM | RESPIRATION RATE: 16 BRPM | HEIGHT: 67 IN | TEMPERATURE: 97.5 F | SYSTOLIC BLOOD PRESSURE: 122 MMHG | WEIGHT: 165 LBS | BODY MASS INDEX: 25.9 KG/M2 | DIASTOLIC BLOOD PRESSURE: 70 MMHG | OXYGEN SATURATION: 97 %

## 2017-01-20 DIAGNOSIS — R10.13 EPIGASTRIC PAIN: ICD-10-CM

## 2017-01-20 DIAGNOSIS — R63.0 POOR APPETITE: ICD-10-CM

## 2017-01-20 DIAGNOSIS — R11.2 NAUSEA AND VOMITING, INTRACTABILITY OF VOMITING NOT SPECIFIED, UNSPECIFIED VOMITING TYPE: Primary | ICD-10-CM

## 2017-01-20 DIAGNOSIS — I50.32 CHRONIC DIASTOLIC CONGESTIVE HEART FAILURE (H): ICD-10-CM

## 2017-01-20 DIAGNOSIS — I42.9 IDIOPATHIC CARDIOMYOPATHY (H): ICD-10-CM

## 2017-01-20 LAB
ALBUMIN SERPL-MCNC: 3.9 G/DL (ref 3.4–5)
ALP SERPL-CCNC: 61 U/L (ref 40–150)
ALT SERPL W P-5'-P-CCNC: 26 U/L (ref 0–70)
ANION GAP SERPL CALCULATED.3IONS-SCNC: 8 MMOL/L (ref 3–14)
AST SERPL W P-5'-P-CCNC: 18 U/L (ref 0–45)
BASOPHILS # BLD AUTO: 0.1 10E9/L (ref 0–0.2)
BASOPHILS NFR BLD AUTO: 1.3 %
BILIRUB SERPL-MCNC: 0.4 MG/DL (ref 0.2–1.3)
BUN SERPL-MCNC: 16 MG/DL (ref 7–30)
CALCIUM SERPL-MCNC: 9.1 MG/DL (ref 8.5–10.1)
CHLORIDE SERPL-SCNC: 105 MMOL/L (ref 94–109)
CO2 SERPL-SCNC: 29 MMOL/L (ref 20–32)
CREAT SERPL-MCNC: 1.61 MG/DL (ref 0.66–1.25)
DIFFERENTIAL METHOD BLD: ABNORMAL
EOSINOPHIL # BLD AUTO: 0.2 10E9/L (ref 0–0.7)
EOSINOPHIL NFR BLD AUTO: 4.3 %
ERYTHROCYTE [DISTWIDTH] IN BLOOD BY AUTOMATED COUNT: 19.9 % (ref 10–15)
GFR SERPL CREATININE-BSD FRML MDRD: 43 ML/MIN/1.7M2
GLUCOSE SERPL-MCNC: 124 MG/DL (ref 70–99)
HCT VFR BLD AUTO: 36.8 % (ref 40–53)
HGB BLD-MCNC: 11 G/DL (ref 13.3–17.7)
LIPASE SERPL-CCNC: 439 U/L (ref 73–393)
LYMPHOCYTES # BLD AUTO: 1.3 10E9/L (ref 0.8–5.3)
LYMPHOCYTES NFR BLD AUTO: 28.4 %
MCH RBC QN AUTO: 25.5 PG (ref 26.5–33)
MCHC RBC AUTO-ENTMCNC: 29.9 G/DL (ref 31.5–36.5)
MCV RBC AUTO: 85 FL (ref 78–100)
MONOCYTES # BLD AUTO: 0.4 10E9/L (ref 0–1.3)
MONOCYTES NFR BLD AUTO: 8.6 %
NEUTROPHILS # BLD AUTO: 2.7 10E9/L (ref 1.6–8.3)
NEUTROPHILS NFR BLD AUTO: 57.4 %
NT-PROBNP SERPL-MCNC: 156 PG/ML (ref 0–125)
PLATELET # BLD AUTO: 565 10E9/L (ref 150–450)
POTASSIUM SERPL-SCNC: 3.9 MMOL/L (ref 3.4–5.3)
PROT SERPL-MCNC: 7.8 G/DL (ref 6.8–8.8)
RBC # BLD AUTO: 4.32 10E12/L (ref 4.4–5.9)
SODIUM SERPL-SCNC: 142 MMOL/L (ref 133–144)
WBC # BLD AUTO: 4.6 10E9/L (ref 4–11)

## 2017-01-20 PROCEDURE — 83880 ASSAY OF NATRIURETIC PEPTIDE: CPT | Performed by: FAMILY MEDICINE

## 2017-01-20 PROCEDURE — 80053 COMPREHEN METABOLIC PANEL: CPT | Performed by: FAMILY MEDICINE

## 2017-01-20 PROCEDURE — 83690 ASSAY OF LIPASE: CPT | Performed by: FAMILY MEDICINE

## 2017-01-20 PROCEDURE — 36415 COLL VENOUS BLD VENIPUNCTURE: CPT | Performed by: FAMILY MEDICINE

## 2017-01-20 PROCEDURE — 85025 COMPLETE CBC W/AUTO DIFF WBC: CPT | Performed by: FAMILY MEDICINE

## 2017-01-20 PROCEDURE — 99214 OFFICE O/P EST MOD 30 MIN: CPT | Performed by: FAMILY MEDICINE

## 2017-01-20 RX ORDER — MIRTAZAPINE 15 MG/1
15 TABLET, FILM COATED ORAL AT BEDTIME
Qty: 30 TABLET | Refills: 1 | Status: SHIPPED | OUTPATIENT
Start: 2017-01-20 | End: 2017-02-20

## 2017-01-20 RX ORDER — ONDANSETRON 4 MG/1
4 TABLET, FILM COATED ORAL EVERY 8 HOURS PRN
Qty: 18 TABLET | Refills: 1 | Status: SHIPPED | OUTPATIENT
Start: 2017-01-20 | End: 2017-02-20

## 2017-01-20 NOTE — PROGRESS NOTES
SUBJECTIVE:                                                    Vaibhav Crabtree is a 66 year old male who presents to clinic today for the following health issues:      Concern - follow up from Monday     Onset: ongoing     Description:   Emesis, headache, wt loss    Intensity: moderate    Progression of Symptoms:  worsening    Accompanying Signs & Symptoms:         Previous history of similar problem:       Precipitating factors:   Worsened by:     Alleviating factors:  Improved by:        Therapies Tried and outcome:       Problem list and histories reviewed & adjusted, as indicated.  Additional history: as documented    Patient Active Problem List   Diagnosis     Dyspnea and respiratory abnormality     Impotence of organic origin     Adjustment reaction     CARDIOVASCULAR SCREENING; LDL GOAL LESS THAN 130     Pain, joint, knee, right     Advanced directives, counseling/discussion     Arthritis of knee, right     Unstable angina (H)     Idiopathic cardiomyopathy (H)     Congestive heart failure (H)     Hypertension     Hyperlipidemia     CAD (coronary artery disease)     Mitral regurgitation     Atypical chest pain     Claudication of right lower extremity (H)     RAYSHAWN (acute kidney injury) (H)     Orthostatic hypotension     Past Surgical History   Procedure Laterality Date     Hc repair rotator cuff,acute  02 and 03     left rotator cuff repair     Hc removal of tonsils,<9 y/o       Tonsils <10y.o.     C nonspecific procedure  2002     ulnar nerve     C nonspecific procedure       right rotator cuff repair       Social History   Substance Use Topics     Smoking status: Former Smoker -- 4.00 packs/day for 15 years     Types: Cigarettes     Quit date: 06/20/1971     Smokeless tobacco: Never Used     Alcohol Use: 0.0 oz/week     0 Standard drinks or equivalent per week      Comment: 1 every other day     Family History   Problem Relation Age of Onset     Connective Tissue Disorder Paternal Grandfather       Depression Daughter      Eye Disorder Son      Respiratory Father      COPD     Unknown/Adopted Mother      CANCER Brother      CANCER Brother          Current Outpatient Prescriptions   Medication Sig Dispense Refill     ondansetron (ZOFRAN) 4 MG tablet Take 1 tablet (4 mg) by mouth every 8 hours as needed for nausea 18 tablet 1     mirtazapine (REMERON) 15 MG tablet Take 1 tablet (15 mg) by mouth At Bedtime 30 tablet 1     furosemide (LASIX) 20 MG tablet Take 1 tablet (20 mg) by mouth daily as needed 30 tablet 12     Acetaminophen (TYLENOL PO) Take 650 mg by mouth every 4 hours as needed for mild pain or fever       lisinopril (PRINIVIL,ZESTRIL) 20 MG tablet Take 1 tablet (20 mg) by mouth daily 90 tablet 1     carvedilol (COREG) 25 MG tablet Take 1 tablet (25 mg) by mouth 2 times daily (with meals) 180 tablet 4     order for DME Equipment being ordered: hinged knee brace, large 1 Device 0     aspirin 325 MG tablet Take 325 mg by mouth daily       Allergies   Allergen Reactions     No Known Allergies      Recent Labs   Lab Test  01/10/17   0953  01/04/17   0704   01/02/17   0956  09/08/16   0736   02/12/15   1730  01/12/15   0525  01/11/15   1240   05/28/14   0605   05/27/14   0255   LDL   --    --    --    --    --    --    --   96   --    --   69   --    --    HDL   --    --    --    --    --    --    --   77   --    --   109   --    --    TRIG   --    --    --    --    --    --    --   130   --    --   130   --    --    ALT   --    --    --   28  10   --   29  34  43   --    --    --    --    CR  1.44*  1.44*   < >  3.92*  1.31*   < >  2.17*  1.13  1.21   < >   --    < >  1.11   GFRESTIMATED  49*  49*   < >  15*  54*   < >  31*  65  60*   < >   --    < >  66   GFRESTBLACK  59*  59*   < >  19*  66   < >  37*  78  72   < >   --    < >  80   POTASSIUM  4.0  4.1   < >  3.2*  3.2*   < >  3.6  4.3  4.0   < >  3.7   < >   --    TSH   --    --    --    --    --    --    --    --   1.90   --    --    --   6.58*    < > =  "values in this interval not displayed.      BP Readings from Last 3 Encounters:   01/20/17 122/70   01/16/17 138/84   01/10/17 162/92    Wt Readings from Last 3 Encounters:   01/20/17 165 lb (74.844 kg)   01/16/17 174 lb (78.926 kg)   01/10/17 174 lb (78.926 kg)                    ROS:  Constitutional, HEENT, cardiovascular, pulmonary, gi and gu systems are negative, except as otherwise noted.    OBJECTIVE:                                                    /70 mmHg  Pulse 72  Temp(Src) 97.5  F (36.4  C)  Resp 16  Ht 5' 7\" (1.702 m)  Wt 165 lb (74.844 kg)  BMI 25.84 kg/m2  SpO2 97%  Body mass index is 25.84 kg/(m^2).  GENERAL: healthy, alert and no distress  NECK: no adenopathy, no asymmetry, masses, or scars and thyroid normal to palpation  RESP: lungs clear to auscultation - no rales, rhonchi or wheezes  CV: regular rate and rhythm, normal S1 S2, no S3 or S4, no murmur, click or rub, no peripheral edema and peripheral pulses strong  ABDOMEN: soft, nontender, no hepatosplenomegaly, no masses and bowel sounds normal  MS: no gross musculoskeletal defects noted, no edema         ASSESSMENT/PLAN:                                                    ASSESSMENT / PLAN:  (R11.2) Nausea and vomiting, intractability of vomiting not specified, unspecified vomiting type  (primary encounter diagnosis)  Comment: not able to keep the food down, lost wt related with lasix, has stable VS  Will have him to start Zofran and Remeron, will also do EGD  Plan: Comprehensive metabolic panel, CBC with         platelets and differential, BNP-N terminal pro,        Lipase, ondansetron (ZOFRAN) 4 MG tablet,         GASTROENTEROLOGY ADULT REF PROCEDURE ONLY            (R10.13) Epigastric pain  Comment: mentioned above    Plan: Comprehensive metabolic panel, CBC with         platelets and differential, BNP-N terminal pro,        Lipase, GASTROENTEROLOGY ADULT REF PROCEDURE         ONLY            (I50.32) Chronic diastolic " congestive heart failure (H)  Comment: SOB is better but still has dizziness and nausea, has decreased intake and wt loss  Plan: BNP-N terminal pro            (R63.0) Poor appetite  Comment: mentioned above   Plan: mirtazapine (REMERON) 15 MG tablet              FUTURE APPOINTMENTS:       - Follow-up visit in 1-30-17    Xander Lee MD  [facility]

## 2017-01-20 NOTE — NURSING NOTE
"Chief Complaint   Patient presents with     RECHECK       Initial /70 mmHg  Pulse 72  Temp(Src) 97.5  F (36.4  C)  Resp 16  Ht 5' 7\" (1.702 m)  Wt 165 lb (74.844 kg)  BMI 25.84 kg/m2  SpO2 97% Estimated body mass index is 25.84 kg/(m^2) as calculated from the following:    Height as of this encounter: 5' 7\" (1.702 m).    Weight as of this encounter: 165 lb (74.844 kg).  BP completed using cuff size: milton. ANA Moreno LPN      "

## 2017-01-20 NOTE — Clinical Note
[address] [address]  Phone: [phone]    January 20, 2017        Vaibhav Crabtree  [address] [facility] [address]          To whom it may concern:    RE: Vaibhav Crabtree    Patient was seen and treated today at our clinic and missed work.  Patient may not return to work until 1-30-17 for current health issue, we will reassess him at 1-30-17 to consider releasing to work.    Please contact me for questions or concerns.      Sincerely,        Xander Lee MD

## 2017-01-22 RX ORDER — FUROSEMIDE 20 MG
30 TABLET ORAL DAILY PRN
Qty: 30 TABLET | Refills: 12
Start: 2017-01-22 | End: 2017-02-17

## 2017-01-24 ENCOUNTER — TRANSFERRED RECORDS (OUTPATIENT)
Dept: HEALTH INFORMATION MANAGEMENT | Facility: CLINIC | Age: 67
End: 2017-01-24

## 2017-01-24 ENCOUNTER — HOSPITAL ENCOUNTER (OUTPATIENT)
Facility: CLINIC | Age: 67
Discharge: HOME | End: 2017-01-24
Attending: SPECIALIST | Admitting: SPECIALIST
Payer: COMMERCIAL

## 2017-01-24 ENCOUNTER — SURGERY (OUTPATIENT)
Age: 67
End: 2017-01-24

## 2017-01-24 VITALS
SYSTOLIC BLOOD PRESSURE: 130 MMHG | WEIGHT: 161.2 LBS | BODY MASS INDEX: 25.3 KG/M2 | OXYGEN SATURATION: 93 % | DIASTOLIC BLOOD PRESSURE: 91 MMHG | RESPIRATION RATE: 17 BRPM | HEIGHT: 67 IN

## 2017-01-24 LAB — UPPER GI ENDOSCOPY: NORMAL

## 2017-01-24 PROCEDURE — 88305 TISSUE EXAM BY PATHOLOGIST: CPT | Performed by: SPECIALIST

## 2017-01-24 PROCEDURE — 25000125 ZZHC RX 250: Performed by: SPECIALIST

## 2017-01-24 PROCEDURE — 43239 EGD BIOPSY SINGLE/MULTIPLE: CPT | Performed by: SPECIALIST

## 2017-01-24 PROCEDURE — 88305 TISSUE EXAM BY PATHOLOGIST: CPT | Mod: 26 | Performed by: SPECIALIST

## 2017-01-24 PROCEDURE — 25000132 ZZH RX MED GY IP 250 OP 250 PS 637: Performed by: SPECIALIST

## 2017-01-24 PROCEDURE — G0500 MOD SEDAT ENDO SERVICE >5YRS: HCPCS | Performed by: SPECIALIST

## 2017-01-24 RX ORDER — ONDANSETRON 2 MG/ML
4 INJECTION INTRAMUSCULAR; INTRAVENOUS
Status: DISCONTINUED | OUTPATIENT
Start: 2017-01-24 | End: 2017-01-24 | Stop reason: HOSPADM

## 2017-01-24 RX ORDER — FENTANYL CITRATE 50 UG/ML
INJECTION, SOLUTION INTRAMUSCULAR; INTRAVENOUS PRN
Status: DISCONTINUED | OUTPATIENT
Start: 2017-01-24 | End: 2017-01-24 | Stop reason: HOSPADM

## 2017-01-24 RX ORDER — LIDOCAINE 40 MG/G
CREAM TOPICAL
Status: DISCONTINUED | OUTPATIENT
Start: 2017-01-24 | End: 2017-01-24 | Stop reason: HOSPADM

## 2017-01-24 RX ADMIN — BENZOCAINE 4 SPRAY: 220 SPRAY, METERED PERIODONTAL at 12:15

## 2017-01-24 RX ADMIN — MIDAZOLAM HYDROCHLORIDE 1 MG: 1 INJECTION, SOLUTION INTRAMUSCULAR; INTRAVENOUS at 12:14

## 2017-01-24 RX ADMIN — MIDAZOLAM HYDROCHLORIDE 0.5 MG: 1 INJECTION, SOLUTION INTRAMUSCULAR; INTRAVENOUS at 12:17

## 2017-01-24 RX ADMIN — FENTANYL CITRATE 25 MCG: 50 INJECTION, SOLUTION INTRAMUSCULAR; INTRAVENOUS at 12:17

## 2017-01-24 RX ADMIN — FENTANYL CITRATE 50 MCG: 50 INJECTION, SOLUTION INTRAMUSCULAR; INTRAVENOUS at 12:14

## 2017-01-24 NOTE — BRIEF OP NOTE
[facility] Brief Operative Note    Pre-operative diagnosis: epigastric pain   Post-operative diagnosis irregular Z line, hiatus hernia, antral deformity     Procedure: Procedure(s):  gastroscopy - Wound Class: II-Clean Contaminated   Surgeon(s): Surgeon(s) and Role:     * Reji Mcghee MD - Primary   Estimated blood loss: * No values recorded between 1/24/2017 12:00 AM and 1/24/2017 12:37 PM *    Specimens:   ID Type Source Tests Collected by Time Destination   A :  Biopsy Stomach, Antrum SURGICAL PATHOLOGY EXAM Reji Mcghee MD 1/24/2017 12:29 PM    B :  Tissue Stomach, Body SURGICAL PATHOLOGY EXAM Reji Mcghee MD 1/24/2017 12:29 PM    C : rule out barretts  Biopsy Gastro Esophageal Junction SURGICAL PATHOLOGY EXAM Reji Mcghee MD 1/24/2017 12:29 PM       Findings: Please see ProVation procedure note in Chart Review

## 2017-01-24 NOTE — H&P
Pre-Endoscopy History and Physical     Vaibhav Crabtree MRN# [id number]   YOB: 1950 Age: 66 year old     Date of Procedure: 1/24/2017  Primary care provider: Xander Lee  Type of Endoscopy: Esophagogastroduodenoscopy with possible biopsy, possible dilation, possible foreign body removal  Reason for Procedure: epigastric distress, refluxs, n & V  Type of Anesthesia Anticipated: Conscious Sedation    HPI:    Vaibhav is a 66 year old male who will be undergoing the above procedure.      A history and physical has been performed. The patient's medications and allergies have been reviewed. The risks and benefits of the procedure and the sedation options and risks were discussed with the patient.  All questions were answered and informed consent was obtained.      He denies a personal or family history of anesthesia complications or bleeding disorders.     Patient Active Problem List   Diagnosis     Dyspnea and respiratory abnormality     Impotence of organic origin     Adjustment reaction     CARDIOVASCULAR SCREENING; LDL GOAL LESS THAN 130     Pain, joint, knee, right     Advanced directives, counseling/discussion     Arthritis of knee, right     Unstable angina (H)     Idiopathic cardiomyopathy (H)     Congestive heart failure (H)     Hypertension     Hyperlipidemia     CAD (coronary artery disease)     Mitral regurgitation     Atypical chest pain     Claudication of right lower extremity (H)     RAYSHAWN (acute kidney injury) (H)     Orthostatic hypotension        Past Medical History   Diagnosis Date     Bite by unspecified animal(E906.5)      Hypertension      Congestive heart failure (H)      Osteoarthritis      Idiopathic cardiomyopathy (H)      EF 30% on 5/2014 echo, EF improved to 50-55% on 1/12/15 echo     Hyperlipidemia      CAD (coronary artery disease)      CTa 1/2015- Ca+ score of 237.02, showed 50-70% stenosis in LAD     Mitral regurgitation      1+ on 1/2015 echo     Claudication of right lower  extremity (H)         Past Surgical History   Procedure Laterality Date     Hc repair rotator cuff,acute  02 and 03     left rotator cuff repair     Hc removal of tonsils,<9 y/o       Tonsils <10y.o.     C nonspecific procedure  2002     ulnar nerve     C nonspecific procedure       right rotator cuff repair       Social History   Substance Use Topics     Smoking status: Former Smoker -- 4.00 packs/day for 15 years     Types: Cigarettes     Quit date: 06/20/1971     Smokeless tobacco: Never Used     Alcohol Use: 0.0 oz/week     0 Standard drinks or equivalent per week      Comment: nothing since the first of year 2017       Family History   Problem Relation Age of Onset     Connective Tissue Disorder Paternal Grandfather      Depression Daughter      Eye Disorder Son      Respiratory Father      COPD     Unknown/Adopted Mother      CANCER Brother      CANCER Brother        Prior to Admission medications    Medication Sig Start Date End Date Taking? Authorizing Provider   furosemide (LASIX) 20 MG tablet Take 1.5 tablets (30 mg) by mouth daily as needed 1/22/17  Yes Xander Lee MD   mirtazapine (REMERON) 15 MG tablet Take 1 tablet (15 mg) by mouth At Bedtime 1/20/17  Yes Xander Lee MD   Acetaminophen (TYLENOL PO) Take 650 mg by mouth every 4 hours as needed for mild pain or fever   Yes Unknown, Entered By History   lisinopril (PRINIVIL,ZESTRIL) 20 MG tablet Take 1 tablet (20 mg) by mouth daily 9/20/16  Yes Xander Lee MD   carvedilol (COREG) 25 MG tablet Take 1 tablet (25 mg) by mouth 2 times daily (with meals) 1/19/16  Yes Richard Asif MD   aspirin 325 MG tablet Take 325 mg by mouth daily   Yes Reported, Patient   ondansetron (ZOFRAN) 4 MG tablet Take 1 tablet (4 mg) by mouth every 8 hours as needed for nausea 1/20/17   Xander Lee MD   order for DME Equipment being ordered: hinged knee brace, large 11/24/15   Jamie Jeffers MD       Allergies   Allergen Reactions     No Known Allergies      "    REVIEW OF SYSTEMS:   5 point ROS negative except as noted above in HPI, including Gen., Resp., CV, GI &  system review.    PHYSICAL EXAM:   /106 mmHg  Resp 16  Ht 1.702 m (5' 7\")  Wt 73.12 kg (161 lb 3.2 oz)  BMI 25.24 kg/m2  SpO2 97% Estimated body mass index is 25.24 kg/(m^2) as calculated from the following:    Height as of this encounter: 1.702 m (5' 7\").    Weight as of this encounter: 73.12 kg (161 lb 3.2 oz).   GENERAL APPEARANCE: alert, and oriented  MENTAL STATUS: alert  AIRWAY EXAM: Mallampatti Class I (visualization of the soft palate, fauces, uvula, anterior and posterior pillars)  RESP: lungs clear to auscultation - no rales, rhonchi or wheezes  CV: regular rates and rhythm  DIAGNOSTICS:    Not indicated    IMPRESSION   ASA Class 2 - Mild systemic disease    PLAN:   Plan for Esophagogastroduodenoscopy with possible biopsy, possible dilation, possible foreign body removal. We discussed the risks, benefits and alternatives and the patient wished to proceed.    The above has been forwarded to the consulting provider.      Signed Electronically by: Reji Mcghee  January 24, 2017            "

## 2017-01-25 LAB — COPATH REPORT: NORMAL

## 2017-01-26 ENCOUNTER — TELEPHONE (OUTPATIENT)
Dept: FAMILY MEDICINE | Facility: CLINIC | Age: 67
End: 2017-01-26

## 2017-01-26 RX ORDER — AMOXICILLIN 500 MG/1
1000 CAPSULE ORAL 2 TIMES DAILY
Qty: 40 CAPSULE | Refills: 0 | Status: CANCELLED | OUTPATIENT
Start: 2017-01-26 | End: 2017-02-05

## 2017-01-26 RX ORDER — CLARITHROMYCIN 500 MG
500 TABLET ORAL 2 TIMES DAILY
Qty: 20 TABLET | Refills: 0 | Status: CANCELLED | OUTPATIENT
Start: 2017-01-26 | End: 2017-02-05

## 2017-01-26 NOTE — TELEPHONE ENCOUNTER
Patient calling regarding prescriptions. See 1/25/17 result note.    Notes Recorded by Reji Mcghee MD on 1/25/2017 at 9:46 PM  H. Pylori gastritis may explain his abdominal pain. Letters sent.   Recommendations:  Omeprazole 20 mg PO twice daily for 10 days  Amoxicillin 500 mg two tabs PO twice daily for 10 days  Clarithromycin 500 mg PO twice daily for 10 days  I haven't sent this yet, as I don't have his pharmacy information.    Meds and pharmacy pended, please review and send as appropriate.     Cele Villarreal RN   [facility] - Triage

## 2017-01-27 ENCOUNTER — TELEPHONE (OUTPATIENT)
Dept: FAMILY MEDICINE | Facility: CLINIC | Age: 67
End: 2017-01-27

## 2017-01-27 DIAGNOSIS — I50.42 CHRONIC COMBINED SYSTOLIC AND DIASTOLIC CONGESTIVE HEART FAILURE (H): ICD-10-CM

## 2017-01-27 DIAGNOSIS — I10 ESSENTIAL HYPERTENSION: Primary | ICD-10-CM

## 2017-01-27 RX ORDER — CARVEDILOL 25 MG/1
25 TABLET ORAL 2 TIMES DAILY WITH MEALS
Qty: 180 TABLET | Refills: 0 | Status: SHIPPED | OUTPATIENT
Start: 2017-01-27 | End: 2017-02-20

## 2017-01-27 RX ORDER — LISINOPRIL 20 MG/1
TABLET ORAL
Qty: 90 TABLET | Refills: 1 | OUTPATIENT
Start: 2017-01-27

## 2017-01-27 NOTE — TELEPHONE ENCOUNTER
lisinopril     Last Written Prescription Date: 9/20/2016  Last Fill Quantity: 90, # refills: 1  Last Office Visit with G, P or [facility] prescribing provider: 1/20/2017   Next 5 appointments (look out 90 days)     Jan 30, 2017  9:20 AM   [place] Visit with Xander Lee [place]   [facility] [place]    [address] [place] [id number] [phone]                   POTASSIUM   Date Value Ref Range Status   01/20/2017 3.9 3.4 - 5.3 mmol/L Final     CREATININE   Date Value Ref Range Status   01/20/2017 1.61* 0.66 - 1.25 mg/dL Final     BP Readings from Last 3 Encounters:   01/24/17 130/91   01/20/17 122/70   01/16/17 138/84

## 2017-01-27 NOTE — TELEPHONE ENCOUNTER
Should have refills on Lisinopril until March- called pharmacy and notified.  Analy Jordan RN  [facility]  [phone]

## 2017-01-27 NOTE — TELEPHONE ENCOUNTER
Forms and records (81 and 83 pages) faxed to both [employer] and [employer]. Confirmation fax was received for both. The packet with the forms/records is in the Team 3 TC shelf on the wall if entire fax was not received.  Alla Hawkins,

## 2017-01-30 ENCOUNTER — OFFICE VISIT (OUTPATIENT)
Dept: FAMILY MEDICINE | Facility: CLINIC | Age: 67
End: 2017-01-30
Payer: COMMERCIAL

## 2017-01-30 VITALS
BODY MASS INDEX: 25.11 KG/M2 | TEMPERATURE: 97.8 F | WEIGHT: 160 LBS | OXYGEN SATURATION: 97 % | HEART RATE: 66 BPM | HEIGHT: 67 IN | SYSTOLIC BLOOD PRESSURE: 110 MMHG | DIASTOLIC BLOOD PRESSURE: 70 MMHG

## 2017-01-30 DIAGNOSIS — I42.9 IDIOPATHIC CARDIOMYOPATHY (H): ICD-10-CM

## 2017-01-30 DIAGNOSIS — N17.9 AKI (ACUTE KIDNEY INJURY) (H): Primary | ICD-10-CM

## 2017-01-30 DIAGNOSIS — I95.1 ORTHOSTATIC HYPOTENSION: ICD-10-CM

## 2017-01-30 DIAGNOSIS — I10 ESSENTIAL HYPERTENSION: ICD-10-CM

## 2017-01-30 LAB
ANION GAP SERPL CALCULATED.3IONS-SCNC: 10 MMOL/L (ref 3–14)
BUN SERPL-MCNC: 23 MG/DL (ref 7–30)
CALCIUM SERPL-MCNC: 9.1 MG/DL (ref 8.5–10.1)
CHLORIDE SERPL-SCNC: 105 MMOL/L (ref 94–109)
CO2 SERPL-SCNC: 26 MMOL/L (ref 20–32)
CREAT SERPL-MCNC: 2.35 MG/DL (ref 0.66–1.25)
GFR SERPL CREATININE-BSD FRML MDRD: 28 ML/MIN/1.7M2
GLUCOSE SERPL-MCNC: 117 MG/DL (ref 70–99)
POTASSIUM SERPL-SCNC: 3.8 MMOL/L (ref 3.4–5.3)
SODIUM SERPL-SCNC: 141 MMOL/L (ref 133–144)

## 2017-01-30 PROCEDURE — 99214 OFFICE O/P EST MOD 30 MIN: CPT | Performed by: FAMILY MEDICINE

## 2017-01-30 PROCEDURE — 80048 BASIC METABOLIC PNL TOTAL CA: CPT | Performed by: FAMILY MEDICINE

## 2017-01-30 PROCEDURE — 36415 COLL VENOUS BLD VENIPUNCTURE: CPT | Performed by: FAMILY MEDICINE

## 2017-01-30 NOTE — Clinical Note
[facility]  [address] [place] [id number]  Phone: [phone]    January 30, 2017        Vaibhav Crabtree  [address] [place] [id number]          To whom it may concern:    RE: Vaibhav Crabtree    Patient has been seen and treated at our clinic.  Patient may return to work on 2-6-17 without work restriction.          Sincerely,        Xander Lee MD

## 2017-01-30 NOTE — TELEPHONE ENCOUNTER
Medications never addressed by provider below. Routing to PCP.   Cele Villarreal RN   [facility] - Triage

## 2017-01-30 NOTE — NURSING NOTE
"Chief Complaint   Patient presents with     RECHECK       Initial /70 mmHg  Pulse 66  Temp(Src) 97.8  F (36.6  C) (Tympanic)  Ht 5' 7\" (1.702 m)  Wt 160 lb (72.576 kg)  BMI 25.05 kg/m2  SpO2 97% Estimated body mass index is 25.05 kg/(m^2) as calculated from the following:    Height as of this encounter: 5' 7\" (1.702 m).    Weight as of this encounter: 160 lb (72.576 kg).  BP completed using cuff size: milton Villa CMA    "

## 2017-01-30 NOTE — PROGRESS NOTES
SUBJECTIVE:                                                    Vaibhav Crabtree is a 66 year old male who presents to clinic today for the following health issues:      Concern - follow up     Onset:     Description:   Follow up on when pt can return to work    Intensity:     Progression of Symptoms:      Accompanying Signs & Symptoms:         Previous history of similar problem:       Precipitating factors:   Worsened by:     Alleviating factors:  Improved by:        Therapies Tried and outcome:       Problem list and histories reviewed & adjusted, as indicated.  Additional history: as documented    Patient Active Problem List   Diagnosis     Dyspnea and respiratory abnormality     Impotence of organic origin     Adjustment reaction     CARDIOVASCULAR SCREENING; LDL GOAL LESS THAN 130     Pain, joint, knee, right     Advanced directives, counseling/discussion     Arthritis of knee, right     Unstable angina (H)     Idiopathic cardiomyopathy (H)     Congestive heart failure (H)     Hypertension     Hyperlipidemia     CAD (coronary artery disease)     Mitral regurgitation     Atypical chest pain     Claudication of right lower extremity (H)     RAYSHAWN (acute kidney injury) (H)     Orthostatic hypotension     Past Surgical History   Procedure Laterality Date     Hc repair rotator cuff,acute  02 and 03     left rotator cuff repair     Hc removal of tonsils,<11 y/o       Tonsils <10y.o.     C nonspecific procedure  2002     ulnar nerve     C nonspecific procedure       right rotator cuff repair     Esophagoscopy, gastroscopy, duodenoscopy (egd), combined N/A 1/24/2017     Procedure: COMBINED ESOPHAGOSCOPY, GASTROSCOPY, DUODENOSCOPY (EGD), BIOPSY SINGLE OR MULTIPLE;  Surgeon: Reji Mcghee MD;  Location:  GI       Social History   Substance Use Topics     Smoking status: Former Smoker -- 4.00 packs/day for 15 years     Types: Cigarettes     Quit date: 06/20/1971     Smokeless tobacco: Never Used     Alcohol Use: 0.0  oz/week     0 Standard drinks or equivalent per week      Comment: nothing since the first of year 2017     Family History   Problem Relation Age of Onset     Connective Tissue Disorder Paternal Grandfather      Depression Daughter      Eye Disorder Son      Respiratory Father      COPD     Unknown/Adopted Mother      CANCER Brother      CANCER Brother          Current Outpatient Prescriptions   Medication Sig Dispense Refill     carvedilol (COREG) 25 MG tablet Take 1 tablet (25 mg) by mouth 2 times daily (with meals) 180 tablet 0     furosemide (LASIX) 20 MG tablet Take 1.5 tablets (30 mg) by mouth daily as needed 30 tablet 12     ondansetron (ZOFRAN) 4 MG tablet Take 1 tablet (4 mg) by mouth every 8 hours as needed for nausea 18 tablet 1     mirtazapine (REMERON) 15 MG tablet Take 1 tablet (15 mg) by mouth At Bedtime 30 tablet 1     Acetaminophen (TYLENOL PO) Take 650 mg by mouth every 4 hours as needed for mild pain or fever       lisinopril (PRINIVIL,ZESTRIL) 20 MG tablet Take 1 tablet (20 mg) by mouth daily 90 tablet 1     order for DME Equipment being ordered: hinged knee brace, large 1 Device 0     aspirin 325 MG tablet Take 325 mg by mouth daily       Allergies   Allergen Reactions     No Known Allergies      Recent Labs   Lab Test  01/20/17   0957  01/10/17   0953   01/02/17   0956  09/08/16   0736   01/12/15   0525  01/11/15   1240   05/28/14   0605   05/27/14   0255   LDL   --    --    --    --    --    --   96   --    --   69   --    --    HDL   --    --    --    --    --    --   77   --    --   109   --    --    TRIG   --    --    --    --    --    --   130   --    --   130   --    --    ALT  26   --    --   28  10   < >  34  43   --    --    --    --    CR  1.61*  1.44*   < >  3.92*  1.31*   < >  1.13  1.21   < >   --    < >  1.11   GFRESTIMATED  43*  49*   < >  15*  54*   < >  65  60*   < >   --    < >  66   GFRESTBLACK  52*  59*   < >  19*  66   < >  78  72   < >   --    < >  80   POTASSIUM  3.9  4.0  "  < >  3.2*  3.2*   < >  4.3  4.0   < >  3.7   < >   --    TSH   --    --    --    --    --    --    --   1.90   --    --    --   6.58*    < > = values in this interval not displayed.      BP Readings from Last 3 Encounters:   01/30/17 110/70   01/24/17 130/91   01/20/17 122/70    Wt Readings from Last 3 Encounters:   01/30/17 160 lb (72.576 kg)   01/24/17 161 lb 3.2 oz (73.12 kg)   01/20/17 165 lb (74.844 kg)                    ROS:  Constitutional, HEENT, cardiovascular, pulmonary, gi and gu systems are negative, except as otherwise noted.    OBJECTIVE:                                                    /70 mmHg  Pulse 66  Temp(Src) 97.8  F (36.6  C) (Tympanic)  Ht 5' 7\" (1.702 m)  Wt 160 lb (72.576 kg)  BMI 25.05 kg/m2  SpO2 97%  Body mass index is 25.05 kg/(m^2).  GENERAL: healthy, alert and no distress  NECK: no adenopathy, no asymmetry, masses, or scars and thyroid normal to palpation  RESP: lungs clear to auscultation - no rales, rhonchi or wheezes  CV: regular rate and rhythm, normal S1 S2, no S3 or S4, no murmur, click or rub, no peripheral edema and peripheral pulses strong  ABDOMEN: soft, nontender, no hepatosplenomegaly, no masses and bowel sounds normal  MS: no gross musculoskeletal defects noted, no edema         ASSESSMENT/PLAN:                                                    ASSESSMENT / PLAN:  (N17.9) RAYSHAWN (acute kidney injury) (H)  (primary encounter diagnosis)  Comment: has been taking lasix with increase dose to 30mg for his recent worsening sx of CHF, sx now is stable, has no clinical sign of worsening, stable BP and Wt, will recheck lab to see kidney function   Plan: Basic metabolic panel  (Ca, Cl, CO2, Creat,         Gluc, K, Na, BUN)            (I42.8) Idiopathic cardiomyopathy (H)  Comment: CHF sx is stable now, will have him to keep watching sx with current dose of lasix   Plan: mentioned above     (I95.1) Orthostatic hypotension  Comment: stable, dizziness and tiredness has " been improving   Plan: will keep watching sx     (I10) Essential hypertension  Comment: stable   Plan: monitoring       FUTURE APPOINTMENTS:       - Follow-up visit in 6 months general routine check     Xander Lee MD  [facility]

## 2017-01-30 NOTE — MR AVS SNAPSHOT
"              After Visit Summary   1/30/2017    Vaibhav Crabtree    MRN: [id number]           Patient Information     Date Of Birth          1950        Visit Information        Provider Department      1/30/2017 9:20 AM Xander Lee MD [facility]        Today's Diagnoses     RAYSHAWN (acute kidney injury) (H)    -  1     Idiopathic cardiomyopathy (H)         Orthostatic hypotension         Essential hypertension            Follow-ups after your visit        Your next 10 appointments already scheduled     Feb 20, 2017  8:45 AM   Return Visit with Richard Asif MD   [facility] [place] ([facility])    [address] [place] [id number] [phone]              Who to contact     If you have questions or need follow up information about today's clinic visit or your schedule please contact [facility] directly at [phone].  Normal or non-critical lab and imaging results will be communicated to you by MyChart, letter or phone within 4 business days after the clinic has received the results. If you do not hear from us within 7 days, please contact the clinic through [employer] or phone. If you have a critical or abnormal lab result, we will notify you by phone as soon as possible.  Submit refill requests through [employer] or call your pharmacy and they will forward the refill request to us. Please allow 3 business days for your refill to be completed.          Additional Information About Your Visit        MyChart Information     [employer] lets you send messages to your doctor, view your test results, renew your prescriptions, schedule appointments and more. To sign up, go to [email or web address] . Click on \"Log in\" on the left side of the screen, which will take you to the Welcome page. Then click on \"Sign up Now\" on the right side of the page.     You will be asked to enter the access code listed below, as well as " "some personal information. Please follow the directions to create your username and password.     Your access code is: [email or web address]  Expires: 3/21/2017 10:09 AM     Your access code will  in 90 days. If you need help or a new code, please call your [place] clinic or [phone].        Care EveryWhere ID     This is your Care EveryWhere ID. This could be used by other organizations to access your [place] medical records  [id number]        Your Vitals Were     Pulse Temperature Height BMI (Body Mass Index) Pulse Oximetry       66 97.8  F (36.6  C) (Tympanic) 5' 7\" (1.702 m) 25.05 kg/m2 97%        Blood Pressure from Last 3 Encounters:   17 110/70   17 130/91   17 122/70    Weight from Last 3 Encounters:   17 160 lb (72.576 kg)   17 161 lb 3.2 oz (73.12 kg)   17 165 lb (74.844 kg)              We Performed the Following     Basic metabolic panel  (Ca, Cl, CO2, Creat, Gluc, K, Na, BUN)        Primary Care Provider Office Phone # Fax #    Xander Lee [phone]       [place] [id number]        Thank you!     Thank you for choosing [facility]  for your care. Our goal is always to provide you with excellent care. Hearing back from our patients is one way we can continue to improve our services. Please take a few minutes to complete the written survey that you may receive in the mail after your visit with us. Thank you!             Your Updated Medication List - Protect others around you: Learn how to safely use, store and throw away your medicines at www.disposemymeds.org.          This list is accurate as of: 17  9:23 AM.  Always use your most recent med list.                   Brand Name Dispense Instructions for use    aspirin 325 MG tablet      Take 325 mg by mouth daily       carvedilol 25 MG tablet    COREG    180 tablet    Take 1 tablet (25 mg) by mouth 2 times daily (with " meals)       furosemide 20 MG tablet    LASIX    30 tablet    Take 1.5 tablets (30 mg) by mouth daily as needed       lisinopril 20 MG tablet    PRINIVIL/ZESTRIL    90 tablet    Take 1 tablet (20 mg) by mouth daily       mirtazapine 15 MG tablet    REMERON    30 tablet    Take 1 tablet (15 mg) by mouth At Bedtime       ondansetron 4 MG tablet    ZOFRAN    18 tablet    Take 1 tablet (4 mg) by mouth every 8 hours as needed for nausea       order for DME     1 Device    Equipment being ordered: hinged knee brace, large       TYLENOL PO      Take 650 mg by mouth every 4 hours as needed for mild pain or fever

## 2017-02-02 ENCOUNTER — DOCUMENTATION ONLY (OUTPATIENT)
Dept: FAMILY MEDICINE | Facility: CLINIC | Age: 67
End: 2017-02-02

## 2017-02-02 NOTE — PROGRESS NOTES
PN's and letter from 1/30 faxed to [facility] [phone] Attn: claim [id number]  February 2, 2017  Columba Smith TC

## 2017-02-16 DIAGNOSIS — I10 ESSENTIAL HYPERTENSION: ICD-10-CM

## 2017-02-16 DIAGNOSIS — I42.9 IDIOPATHIC CARDIOMYOPATHY (H): ICD-10-CM

## 2017-02-16 DIAGNOSIS — I50.32 CHRONIC DIASTOLIC CONGESTIVE HEART FAILURE (H): ICD-10-CM

## 2017-02-17 RX ORDER — LISINOPRIL 20 MG/1
TABLET ORAL
Qty: 90 TABLET | Refills: 1 | Status: SHIPPED | OUTPATIENT
Start: 2017-02-17 | End: 2017-02-20

## 2017-02-17 RX ORDER — FUROSEMIDE 20 MG
20 TABLET ORAL DAILY PRN
Qty: 30 TABLET | Refills: 12 | Status: SHIPPED | OUTPATIENT
Start: 2017-02-17 | End: 2017-04-25

## 2017-02-17 NOTE — TELEPHONE ENCOUNTER
Routing refill request for Lisinopril to provider for review/approval because:  Labs out of range:  CR        Lasix      Last Written Prescription Date: 1/22/17 (no print out)  Last Fill Quantity: 30, # refills: 12  Last Office Visit with [facility], [facility] or [facility] prescribing provider: 1/30/17  Next 5 appointments (look out 90 days)     Feb 20, 2017  8:45 AM CST   Return Visit with Richard Asif MD   [facility] ([facility])    [address] [place] [address] [phone]                   Potassium   Date Value Ref Range Status   01/30/2017 3.8 3.4 - 5.3 mmol/L Final     Creatinine   Date Value Ref Range Status   01/30/2017 2.35 (H) 0.66 - 1.25 mg/dL Final     BP Readings from Last 3 Encounters:   01/30/17 110/70   01/24/17 (!) 130/91   01/20/17 122/70                  Routing refill request to provider for review/approval because:  [facility] requesting new rx with updated sig, last rx written for 30 mg dosing, is LASIX to be a PRN med or daily med?    CINDY Baker

## 2017-02-17 NOTE — TELEPHONE ENCOUNTER
Lisinopril      Last Written Prescription Date: 9/20/16  Last Fill Quantity: 90, # refills: 1  Last Office Visit with [facility], [facility] or [facility] prescribing provider: 1/30/17  Next 5 appointments (look out 90 days)     Feb 20, 2017  8:45 AM CST   Return Visit with Richard Asif MD   [facility] ([facility])    [address] [place] [id number] [phone]                   Potassium   Date Value Ref Range Status   01/30/2017 3.8 3.4 - 5.3 mmol/L Final     Creatinine   Date Value Ref Range Status   01/30/2017 2.35 (H) 0.66 - 1.25 mg/dL Final     BP Readings from Last 3 Encounters:   01/30/17 110/70   01/24/17 (!) 130/91   01/20/17 122/70     Corrina Villa [facility]

## 2017-02-20 ENCOUNTER — OFFICE VISIT (OUTPATIENT)
Dept: CARDIOLOGY | Facility: CLINIC | Age: 67
End: 2017-02-20
Attending: INTERNAL MEDICINE
Payer: COMMERCIAL

## 2017-02-20 VITALS
HEIGHT: 67 IN | WEIGHT: 171.8 LBS | BODY MASS INDEX: 26.97 KG/M2 | DIASTOLIC BLOOD PRESSURE: 82 MMHG | HEART RATE: 72 BPM | SYSTOLIC BLOOD PRESSURE: 124 MMHG

## 2017-02-20 DIAGNOSIS — I25.10 CORONARY ARTERY DISEASE INVOLVING NATIVE CORONARY ARTERY OF NATIVE HEART WITHOUT ANGINA PECTORIS: ICD-10-CM

## 2017-02-20 DIAGNOSIS — I42.9 IDIOPATHIC CARDIOMYOPATHY (H): ICD-10-CM

## 2017-02-20 DIAGNOSIS — I10 ESSENTIAL HYPERTENSION: ICD-10-CM

## 2017-02-20 DIAGNOSIS — N28.9 RENAL INSUFFICIENCY: Primary | ICD-10-CM

## 2017-02-20 DIAGNOSIS — I43 CARDIOMYOPATHY IN DISEASES CLASSIFIED ELSEWHERE (H): ICD-10-CM

## 2017-02-20 DIAGNOSIS — I50.42 CHRONIC COMBINED SYSTOLIC AND DIASTOLIC CONGESTIVE HEART FAILURE (H): ICD-10-CM

## 2017-02-20 LAB
ANION GAP SERPL CALCULATED.3IONS-SCNC: 12.8 MMOL/L (ref 6–17)
BUN SERPL-MCNC: 26 MG/DL (ref 7–30)
CALCIUM SERPL-MCNC: 8.5 MG/DL (ref 8.5–10.5)
CHLORIDE SERPL-SCNC: 109 MMOL/L (ref 98–107)
CO2 SERPL-SCNC: 25 MMOL/L (ref 23–29)
CREAT SERPL-MCNC: 1.36 MG/DL (ref 0.7–1.3)
GFR SERPL CREATININE-BSD FRML MDRD: 52 ML/MIN/1.7M2
GLUCOSE SERPL-MCNC: 99 MG/DL (ref 70–105)
POTASSIUM SERPL-SCNC: 4.8 MMOL/L (ref 3.5–5.1)
SODIUM SERPL-SCNC: 142 MMOL/L (ref 136–145)

## 2017-02-20 PROCEDURE — 36415 COLL VENOUS BLD VENIPUNCTURE: CPT | Performed by: INTERNAL MEDICINE

## 2017-02-20 PROCEDURE — 99214 OFFICE O/P EST MOD 30 MIN: CPT | Performed by: INTERNAL MEDICINE

## 2017-02-20 PROCEDURE — 80048 BASIC METABOLIC PNL TOTAL CA: CPT | Performed by: INTERNAL MEDICINE

## 2017-02-20 RX ORDER — CARVEDILOL 25 MG/1
25 TABLET ORAL 2 TIMES DAILY WITH MEALS
Qty: 180 TABLET | Refills: 3 | Status: SHIPPED | OUTPATIENT
Start: 2017-02-20 | End: 2017-05-22

## 2017-02-20 RX ORDER — LISINOPRIL 20 MG/1
20 TABLET ORAL DAILY
Qty: 90 TABLET | Refills: 3 | Status: SHIPPED | OUTPATIENT
Start: 2017-02-20 | End: 2017-04-15 | Stop reason: DRUGHIGH

## 2017-02-20 RX ORDER — LISINOPRIL 20 MG/1
20 TABLET ORAL DAILY
COMMUNITY
End: 2017-02-20

## 2017-02-20 NOTE — PROGRESS NOTES
2017            Xander Lee MD   [address]  [address]       RE: Vaibhav Crabtree   MRN: [id number]   : 1950      Dear Dr. Lee:      I got together with Vaibhav Crabtree again today.  He has a history of nonischemic cardiomyopathy with ejection fractions of 25%.  Medical therapy was associated with recovery of systolic function to 50%-55%.  I saw him in 2015 and have seen him subsequently on a yearly basis.  He has been free of obvious angina or heart failure.  More recently on 2015 he was admitted with weakness and dizziness.  He was found to be dehydrated with elevated BUN and creatinine up to 3.9.  There was also concern that he had returned to drinking alcohol more heavily.      He recovered rather nicely.  BUN and creatinine recovered.  Subsequently on 01/10/2017 his creatinine was down to 1.4.  On 2017 it was up to 2.3 with a BUN going from 10 up to 23.  He says since that time he has been hydrating and drinking fluids aggressively.      He is back to work.  He denies chest pain, palpitations, dizziness, syncope or lower leg edema.  He takes his medications he says faithfully.  He gets short of breath if he really pushes himself, but he rarely does so.      He is working part-time at [employer].  He is on his feet all day long and 5-hour days are very doable for him and an 8-hour day often stresses his lower legs.      Appetite, bowel and urinary functions are stable.  Review of systems was relatively negative otherwise.      PHYSICAL EXAMINATION:   VITAL SIGNS:  Showed a blood pressure of 124/80, heart rate 72 beats a minute, he weighed 171 pounds.   HEAD AND NECK:  Normal.  Carotids were equal, no bruits heard.   HEART:  Regular without gallop or murmur.   LUNGS:  Clear.   ABDOMEN:  Soft without organomegaly.   EXTREMITIES:  Free of edema.      Mr. Crabtree has not had signs of angina or heart failure.  His most  recent issues have revolved around some nutritional and hydration issues.  I checked a BMP today, specifically checking his creatinine.  Should he have persistent renal or prerenal azotemia we may need to stop the ACE inhibitor and treat his blood pressure/unload his left ventricle with some other medication.  If his creatinine is recovered, then I would continue his current program.      We agreed to get together in a year and I did order a BMP.  I will likely get a surveillance echo in a year.  If you have questions, concerns or disagreements, give me a call.      IMPRESSION:   1.  No evidence of left or right heart failure.   2.  Renal and prerenal azotemia recently.  BMP ordered.   3.  Hypertension.   4.  History of hyperlipidemia.   5.  Mild noncritical coronary disease.   6.  Mild noncritical peripheral vascular arterial disease.      PLAN:  As above.      Sincerely,         CAMMY GARCIA MD, [facility]             D: 2017 09:01   T: 2017 12:36   MT: [id number]      Name:     DAMIAN MEEKS   MRN:      [id number]        Account:      [id number]   :      1950           Service Date: 2017      Document: [id number]

## 2017-02-20 NOTE — MR AVS SNAPSHOT
"              After Visit Summary   2/20/2017    Vaibhav Crabtree    MRN: [id number]           Patient Information     Date Of Birth          1950        Visit Information        Provider Department      2/20/2017 8:45 AM Richard Asif MD [facility] [place]        Today's Diagnoses     Renal insufficiency    -  1    Chronic combined systolic and diastolic congestive heart failure (H)        Coronary artery disease involving native coronary artery of native heart without angina pectoris        Cardiomyopathy in diseases classified elsewhere (H)        Essential hypertension        Idiopathic cardiomyopathy (H)           Follow-ups after your visit        Future tests that were ordered for you today     Open Future Orders        Priority Expected Expires Ordered    Basic metabolic panel Routine 2/20/2017 2/15/2018 2/20/2017            Who to contact     If you have questions or need follow up information about today's clinic visit or your schedule please contact [facility] directly at [phone].  Normal or non-critical lab and imaging results will be communicated to you by MyChart, letter or phone within 4 business days after the clinic has received the results. If you do not hear from us within 7 days, please contact the clinic through [employer] or phone. If you have a critical or abnormal lab result, we will notify you by phone as soon as possible.  Submit refill requests through [email or web address] or call your pharmacy and they will forward the refill request to us. Please allow 3 business days for your refill to be completed.          Additional Information About Your Visit        MyChart Information     [email or web address] lets you send messages to your doctor, view your test results, renew your prescriptions, schedule appointments and more. To sign up, go to [email or web address] . Click on \"Log in\" on the left side of the screen, which will take " "you to the Welcome page. Then click on \"Sign up Now\" on the right side of the page.     You will be asked to enter the access code listed below, as well as some personal information. Please follow the directions to create your username and password.     Your access code is: [email or web address]  Expires: 3/21/2017 10:09 AM     Your access code will  in 90 days. If you need help or a new code, please call your [place] clinic or [phone].        Care EveryWhere ID     This is your Care EveryWhere ID. This could be used by other organizations to access your [place] medical records  [id number]        Your Vitals Were     Pulse Height BMI (Body Mass Index)             72 1.702 m (5' 7\") 26.91 kg/m2          Blood Pressure from Last 3 Encounters:   17 124/82   17 110/70   17 (!) 130/91    Weight from Last 3 Encounters:   17 77.9 kg (171 lb 12.8 oz)   17 72.6 kg (160 lb)   17 73.1 kg (161 lb 3.2 oz)              We Performed the Following     Follow-Up with Cardiologist          Where to get your medicines      These medications were sent to [id number] - [address] [address]     Phone:  [phone]     carvedilol 25 MG tablet    lisinopril 20 MG tablet          Primary Care Provider Office Phone # Fax #    Xander Lee [phone]       [address] [address]        Thank you!     Thank you for choosing [facility] [place]  for your care. Our goal is always to provide you with excellent care. Hearing back from our patients is one way we can continue to improve our services. Please take a few minutes to complete the written survey that you may receive in the mail after your visit with us. Thank you!             Your Updated Medication List - Protect others around you: Learn how to safely use, store and throw away your " medicines at www.disposemymeds.org.          This list is accurate as of: 2/20/17  8:53 AM.  Always use your most recent med list.                   Brand Name Dispense Instructions for use    aspirin 325 MG tablet      Take 325 mg by mouth daily       carvedilol 25 MG tablet    COREG    180 tablet    Take 1 tablet (25 mg) by mouth 2 times daily (with meals)       furosemide 20 MG tablet    LASIX    30 tablet    Take 1 tablet (20 mg) by mouth daily as needed       lisinopril 20 MG tablet    PRINIVIL/ZESTRIL    90 tablet    Take 1 tablet (20 mg) by mouth daily       order for DME     1 Device    Equipment being ordered: hinged knee brace, large       TYLENOL PO      Take 650 mg by mouth every 4 hours as needed for mild pain or fever

## 2017-02-20 NOTE — PROGRESS NOTES
Hospital DC summary from 1-4-2017:    66-year-old male with history of idiopathic cardiomyopathy, coronary artery disease, hypertension and hyperlipidemia who was admitted on 1/2/17, due to dizziness and lightheadedness for 3-4 days. In the ER, the pt had orthostatic hypotension, with his B.P. dropping from 103/65mmHg to 77/47mmHg, with change in position to standing. Work up done on 1/2/17 revealed, CMP significant for K+ 3.2, creat 3.92. CBC revealed, Hb 11, WBC 6.6, Plts 359. D-Dimer 0.3. Initial troponin was negative. BNP was normal. Right lower extremity venous US on 1/2/17 was negative for DVT. Chest x-rays on 1/2/17 was negative.      He was admitted to the medical floor and started on IV N/Saline. His lisinopril and lasix were held on admission. His creatinine improved from 3.92-->2.92-->1.44 on 1/4/17. The patient reported feeling fine and was ambulating with no symptoms. His daughter confided in me that, the patient has a history of heavy alcohol abuse and he restarted drinking again recently. She stated that her dad, had several episodes of vomiting and reduced oral intake prior this admission. On discussing with the patient, he denied taking more than 2 beers a day. He was discharged home and is to follow up with his primary care provider as needed.     HPI and Plan:   See dictation        No orders of the defined types were placed in this encounter.    Orders Placed This Encounter   Medications     lisinopril (PRINIVIL/ZESTRIL) 20 MG tablet     Sig: Take 20 mg by mouth daily     Medications Discontinued During This Encounter   Medication Reason     mirtazapine (REMERON) 15 MG tablet Stopped by Patient     ondansetron (ZOFRAN) 4 MG tablet Stopped by Patient     lisinopril (PRINIVIL/ZESTRIL) 20 MG tablet Medication Reconciliation Clean Up         Encounter Diagnoses   Name Primary?     Chronic combined systolic and diastolic congestive heart failure (H)      Coronary artery disease involving native  coronary artery of native heart without angina pectoris      Cardiomyopathy in diseases classified elsewhere (H)      Essential hypertension      Idiopathic cardiomyopathy (H)        CURRENT MEDICATIONS:  Current Outpatient Prescriptions   Medication Sig Dispense Refill     lisinopril (PRINIVIL/ZESTRIL) 20 MG tablet Take 20 mg by mouth daily       furosemide (LASIX) 20 MG tablet Take 1 tablet (20 mg) by mouth daily as needed 30 tablet 12     carvedilol (COREG) 25 MG tablet Take 1 tablet (25 mg) by mouth 2 times daily (with meals) 180 tablet 0     Acetaminophen (TYLENOL PO) Take 650 mg by mouth every 4 hours as needed for mild pain or fever       order for DME Equipment being ordered: hinged knee brace, large 1 Device 0     aspirin 325 MG tablet Take 325 mg by mouth daily       [DISCONTINUED] lisinopril (PRINIVIL/ZESTRIL) 20 MG tablet TAKE 1 TABLET BY MOUTH DAILY 90 tablet 1       ALLERGIES     Allergies   Allergen Reactions     No Known Allergies        PAST MEDICAL HISTORY:  Past Medical History   Diagnosis Date     Bite by unspecified animal(E906.5)      CAD (coronary artery disease)      CTa 1/2015- Ca+ score of 237.02, showed 50-70% stenosis in LAD     Claudication of right lower extremity (H)      Congestive heart failure (H)      Hyperlipidemia      Hypertension      Idiopathic cardiomyopathy (H)      EF 30% on 5/2014 echo, EF improved to 50-55% on 1/12/15 echo     Mitral regurgitation      1+ on 1/2015 echo     Osteoarthritis        PAST SURGICAL HISTORY:  Past Surgical History   Procedure Laterality Date     Hc repair rotator cuff,acute  02 and 03     left rotator cuff repair     Hc removal of tonsils,<11 y/o       Tonsils <10y.o.     C nonspecific procedure  2002     ulnar nerve     C nonspecific procedure       right rotator cuff repair     Esophagoscopy, gastroscopy, duodenoscopy (egd), combined N/A 1/24/2017     Procedure: COMBINED ESOPHAGOSCOPY, GASTROSCOPY, DUODENOSCOPY (EGD), BIOPSY SINGLE OR  "MULTIPLE;  Surgeon: Reji Mcghee MD;  Location: [facility]       FAMILY HISTORY:  Family History   Problem Relation Age of Onset     Respiratory Father      COPD     Unknown/Adopted Mother      CANCER Brother      CANCER Brother      Connective Tissue Disorder Paternal Grandfather      Depression Daughter      Eye Disorder Son        SOCIAL HISTORY:  Social History     Social History     Marital status:      Spouse name: lucian     Number of children: 2     Years of education: 9     Occupational History     [employer]       [employer]     Social History Main Topics     Smoking status: Former Smoker     Packs/day: 4.00     Years: 15.00     Types: Cigarettes     Quit date: 6/20/1971     Smokeless tobacco: Never Used     Alcohol use 0.0 oz/week     0 Standard drinks or equivalent per week      Comment: occ. beer     Drug use: No     Sexual activity: Yes     Partners: Female     Other Topics Concern      Service No     Blood Transfusions No     Caffeine Concern No     Occupational Exposure No     Hobby Hazards No     Sleep Concern Yes     Waking with hot sweats     Stress Concern No     Weight Concern No     Special Diet No     Back Care No     Exercise Yes     Walks all day     Bike Helmet No     Seat Belt Yes     Self-Exams No     Social History Narrative         Review of Systems:  Skin:  Negative       Eyes:  Positive for glasses    ENT:  Negative      Respiratory:  Negative       Cardiovascular:  Negative;palpitations;chest pain;syncope or near-syncope;cyanosis;lightheadedness;dizziness;edema Positive for;fatigue;exercise intolerance    Gastroenterology: Negative      Genitourinary:  Negative      Musculoskeletal:  Positive for joint pain    Neurologic:  Negative      Psychiatric:  Negative      Heme/Lymph/Imm:  Negative      Endocrine:  Negative        Physical Exam:  Vitals: /82  Pulse 72  Ht 1.702 m (5' 7\")  Wt 77.9 kg (171 lb 12.8 oz)  BMI 26.91 kg/m2    Constitutional:  cooperative;alert and " oriented;well developed chronically ill      Skin:  warm and dry to the touch        Head:  normocephalic, no masses or lesions        Eyes:  pupils equal and round;sclera white        ENT:  no pallor or cyanosis        Neck:  carotid pulses are full and equal bilaterally;JVP normal;no carotid bruit        Chest:  clear to auscultation          Cardiac: regular rhythm;no murmurs, gallops or rubs detected                  Abdomen:  abdomen soft;no masses;non-tender        Vascular: not assessed this visit                                        Extremities and Back:    stasis pigmentation bilateral LE edema;trace          Neurological:  affect appropriate, oriented to time, person and place;no gross motor deficits          Recent Lab Results:  LIPID RESULTS:  Lab Results   Component Value Date    CHOL 199 01/12/2015    HDL 77 01/12/2015    LDL 96 01/12/2015    TRIG 130 01/12/2015    CHOLHDLRATIO 2.6 01/12/2015       LIVER ENZYME RESULTS:  Lab Results   Component Value Date    AST 18 01/20/2017    ALT 26 01/20/2017       CBC RESULTS:  Lab Results   Component Value Date    WBC 4.6 01/20/2017    RBC 4.32 (L) 01/20/2017    HGB 11.0 (L) 01/20/2017    HCT 36.8 (L) 01/20/2017    MCV 85 01/20/2017    MCH 25.5 (L) 01/20/2017    MCHC 29.9 (L) 01/20/2017    RDW 19.9 (H) 01/20/2017     (H) 01/20/2017       BMP RESULTS:  Lab Results   Component Value Date     01/30/2017    POTASSIUM 3.8 01/30/2017    CHLORIDE 105 01/30/2017    CO2 26 01/30/2017    ANIONGAP 10 01/30/2017     (H) 01/30/2017    BUN 23 01/30/2017    CR 2.35 (H) 01/30/2017    GFRESTIMATED 28 (L) 01/30/2017    GFRESTBLACK 33 (L) 01/30/2017    ALCIDES 9.1 01/30/2017        A1C RESULTS:  No results found for: A1C    INR RESULTS:  Lab Results   Component Value Date    INR 1.07 09/08/2016    INR 1.02 02/12/2015           CC  Richard Asif MD   [facility]  [address]  [place] [id number]

## 2017-02-20 NOTE — LETTER
2017            Xander Lee MD   [address]  [address]       RE: Vaibhav Crabtree   MRN: [id number]   : 1950      Dear Dr. Lee:      I got together with Vaibhav Crabtree again today.  He has a history of nonischemic cardiomyopathy with ejection fractions of 25%.  Medical therapy was associated with recovery of systolic function to 50%-55%.  I saw him in 2015 and have seen him subsequently on a yearly basis.  He has been free of obvious angina or heart failure.  More recently on 2015 he was admitted with weakness and dizziness.  He was found to be dehydrated with elevated BUN and creatinine up to 3.9.  There was also concern that he had returned to drinking alcohol more heavily.      He recovered rather nicely.  BUN and creatinine recovered.  Subsequently on 01/10/2017 his creatinine was down to 1.4.  On 2017 it was up to 2.3 with a BUN going from 10 up to 23.  He says since that time he has been hydrating and drinking fluids aggressively.      He is back to work.  He denies chest pain, palpitations, dizziness, syncope or lower leg edema.  He takes his medications he says faithfully.  He gets short of breath if he really pushes himself, but he rarely does so.      He is working part-time at [employer].  He is on his feet all day long and 5-hour days are very doable for him and an 8-hour day often stresses his lower legs.      Appetite, bowel and urinary functions are stable.  Review of systems was relatively negative otherwise.      PHYSICAL EXAMINATION:   VITAL SIGNS:  Showed a blood pressure of 124/80, heart rate 72 beats a minute, he weighed 171 pounds.   HEAD AND NECK:  Normal.  Carotids were equal, no bruits heard.   HEART:  Regular without gallop or murmur.   LUNGS:  Clear.   ABDOMEN:  Soft without organomegaly.   EXTREMITIES:  Free of edema.      Mr. Crabtree has not had signs of angina or heart failure.  His most  recent issues have revolved around some nutritional and hydration issues.  I checked a BMP today, specifically checking his creatinine.  Should he have persistent renal or prerenal azotemia we may need to stop the ACE inhibitor and treat his blood pressure/unload his left ventricle with some other medication.  If his creatinine is recovered, then I would continue his current program.      We agreed to get together in a year and I did order a BMP.  I will likely get a surveillance echo in a year.  If you have questions, concerns or disagreements, give me a call.      IMPRESSION:   1.  No evidence of left or right heart failure.   2.  Renal and prerenal azotemia recently.  BMP ordered.   3.  Hypertension.   4.  History of hyperlipidemia.   5.  Mild noncritical coronary disease.   6.  Mild noncritical peripheral vascular arterial disease.      PLAN:  As above.      Sincerely,         CAMMY GARCIA MD, [facility]

## 2017-02-22 ENCOUNTER — TELEPHONE (OUTPATIENT)
Dept: FAMILY MEDICINE | Facility: CLINIC | Age: 67
End: 2017-02-22

## 2017-02-22 NOTE — TELEPHONE ENCOUNTER
Patient calling for results of BMP. States that he hasn't heard any results.    States that he worked yesterday and he had kidney pain and SOB when he works for 8 hour days. Patient states that he needs a letter for work to decrease to 5 hours a day.     Please advise. Triage to call the patient back.  Germania Adams RN

## 2017-03-06 ENCOUNTER — TELEPHONE (OUTPATIENT)
Dept: FAMILY MEDICINE | Facility: CLINIC | Age: 67
End: 2017-03-06

## 2017-03-06 ENCOUNTER — RADIANT APPOINTMENT (OUTPATIENT)
Dept: GENERAL RADIOLOGY | Facility: CLINIC | Age: 67
End: 2017-03-06
Attending: PHYSICIAN ASSISTANT
Payer: COMMERCIAL

## 2017-03-06 ENCOUNTER — OFFICE VISIT (OUTPATIENT)
Dept: URGENT CARE | Facility: URGENT CARE | Age: 67
End: 2017-03-06
Payer: COMMERCIAL

## 2017-03-06 VITALS
SYSTOLIC BLOOD PRESSURE: 122 MMHG | OXYGEN SATURATION: 94 % | HEART RATE: 86 BPM | HEIGHT: 67 IN | BODY MASS INDEX: 26.98 KG/M2 | WEIGHT: 171.9 LBS | DIASTOLIC BLOOD PRESSURE: 70 MMHG | TEMPERATURE: 98.4 F

## 2017-03-06 DIAGNOSIS — I50.9 CONGESTIVE HEART FAILURE, UNSPECIFIED CONGESTIVE HEART FAILURE CHRONICITY, UNSPECIFIED CONGESTIVE HEART FAILURE TYPE: ICD-10-CM

## 2017-03-06 DIAGNOSIS — M54.50 ACUTE BILATERAL LOW BACK PAIN WITHOUT SCIATICA: ICD-10-CM

## 2017-03-06 DIAGNOSIS — M54.5 ACUTE BILATERAL LOW BACK PAIN, WITH SCIATICA PRESENCE UNSPECIFIED: Primary | ICD-10-CM

## 2017-03-06 LAB
ALBUMIN UR-MCNC: NEGATIVE MG/DL
ANION GAP SERPL CALCULATED.3IONS-SCNC: 6 MMOL/L (ref 3–14)
APPEARANCE UR: CLEAR
BASOPHILS # BLD AUTO: 0.1 10E9/L (ref 0–0.2)
BASOPHILS NFR BLD AUTO: 1 %
BILIRUB UR QL STRIP: NEGATIVE
BUN SERPL-MCNC: 14 MG/DL (ref 7–30)
CALCIUM SERPL-MCNC: 8.1 MG/DL (ref 8.5–10.1)
CHLORIDE SERPL-SCNC: 109 MMOL/L (ref 94–109)
CO2 SERPL-SCNC: 28 MMOL/L (ref 20–32)
COLOR UR AUTO: YELLOW
CREAT SERPL-MCNC: 1.45 MG/DL (ref 0.66–1.25)
DIFFERENTIAL METHOD BLD: ABNORMAL
EOSINOPHIL # BLD AUTO: 0.2 10E9/L (ref 0–0.7)
EOSINOPHIL NFR BLD AUTO: 4.2 %
ERYTHROCYTE [DISTWIDTH] IN BLOOD BY AUTOMATED COUNT: 18.4 % (ref 10–15)
GFR SERPL CREATININE-BSD FRML MDRD: 48 ML/MIN/1.7M2
GLUCOSE SERPL-MCNC: 114 MG/DL (ref 70–99)
GLUCOSE UR STRIP-MCNC: NEGATIVE MG/DL
HCT VFR BLD AUTO: 34.6 % (ref 40–53)
HGB BLD-MCNC: 10.5 G/DL (ref 13.3–17.7)
HGB UR QL STRIP: NEGATIVE
KETONES UR STRIP-MCNC: NEGATIVE MG/DL
LEUKOCYTE ESTERASE UR QL STRIP: NEGATIVE
LYMPHOCYTES # BLD AUTO: 1.4 10E9/L (ref 0.8–5.3)
LYMPHOCYTES NFR BLD AUTO: 27.3 %
MCH RBC QN AUTO: 26.1 PG (ref 26.5–33)
MCHC RBC AUTO-ENTMCNC: 30.3 G/DL (ref 31.5–36.5)
MCV RBC AUTO: 86 FL (ref 78–100)
MONOCYTES # BLD AUTO: 0.4 10E9/L (ref 0–1.3)
MONOCYTES NFR BLD AUTO: 8.2 %
NEUTROPHILS # BLD AUTO: 3 10E9/L (ref 1.6–8.3)
NEUTROPHILS NFR BLD AUTO: 59.3 %
NITRATE UR QL: NEGATIVE
PH UR STRIP: 5.5 PH (ref 5–7)
PLATELET # BLD AUTO: 398 10E9/L (ref 150–450)
POTASSIUM SERPL-SCNC: 4.1 MMOL/L (ref 3.4–5.3)
RBC # BLD AUTO: 4.03 10E12/L (ref 4.4–5.9)
SODIUM SERPL-SCNC: 143 MMOL/L (ref 133–144)
SP GR UR STRIP: 1.02 (ref 1–1.03)
URN SPEC COLLECT METH UR: NORMAL
UROBILINOGEN UR STRIP-ACNC: 0.2 EU/DL (ref 0.2–1)
WBC # BLD AUTO: 5 10E9/L (ref 4–11)

## 2017-03-06 PROCEDURE — 71020 XR CHEST 2 VW: CPT

## 2017-03-06 PROCEDURE — 99214 OFFICE O/P EST MOD 30 MIN: CPT | Performed by: PHYSICIAN ASSISTANT

## 2017-03-06 PROCEDURE — 81003 URINALYSIS AUTO W/O SCOPE: CPT | Performed by: PHYSICIAN ASSISTANT

## 2017-03-06 PROCEDURE — 80048 BASIC METABOLIC PNL TOTAL CA: CPT | Performed by: PHYSICIAN ASSISTANT

## 2017-03-06 PROCEDURE — 85025 COMPLETE CBC W/AUTO DIFF WBC: CPT | Performed by: PHYSICIAN ASSISTANT

## 2017-03-06 PROCEDURE — 36415 COLL VENOUS BLD VENIPUNCTURE: CPT | Performed by: PHYSICIAN ASSISTANT

## 2017-03-06 RX ORDER — HYDROCODONE BITARTRATE AND ACETAMINOPHEN 5; 325 MG/1; MG/1
1 TABLET ORAL EVERY 6 HOURS PRN
Qty: 10 TABLET | Refills: 0 | Status: SHIPPED | OUTPATIENT
Start: 2017-03-06 | End: 2017-04-15

## 2017-03-06 RX ORDER — METHOCARBAMOL 500 MG/1
500 TABLET, FILM COATED ORAL 3 TIMES DAILY PRN
Qty: 21 TABLET | Refills: 0 | Status: SHIPPED | OUTPATIENT
Start: 2017-03-06 | End: 2017-04-15

## 2017-03-06 NOTE — LETTER
[facility]  [address] [id number] [phone]      March 6, 2017    RE:  Vaibhav Crabtree                                                                                                                                                       [address] [facility] [address]            To whom it may concern:    Vaibhav Crabtree was seen in the urgent care today for back pain.  He will miss work 3/6-3/8.      Sincerely,        Wellington Lemos [facility]

## 2017-03-06 NOTE — NURSING NOTE
"Chief Complaint   Patient presents with     Back Pain     Pt c/o low back pain X 2 days. Pt has a hx of kidney failure. Pt would like this evaluated.     Urgent Care       Initial /70  Pulse 86  Temp 98.4  F (36.9  C)  Ht 5' 7\" (1.702 m)  Wt 171 lb 14.4 oz (78 kg)  SpO2 94%  BMI 26.92 kg/m2 Estimated body mass index is 26.92 kg/(m^2) as calculated from the following:    Height as of this encounter: 5' 7\" (1.702 m).    Weight as of this encounter: 171 lb 14.4 oz (78 kg).  Medication Reconciliation: complete    "

## 2017-03-06 NOTE — TELEPHONE ENCOUNTER
FLANK PAIN     Onset: last night    Description:   Character: steady pain  Location: right flank left flank  Radiation: None    Intensity: mild    Progression of Symptoms:  Same unless moving or lifting    Accompanying Signs & Symptoms:  Fever/Chills?: no   Gas/Bloating: no   Nausea: no   Vomitting: YES yesterday - unsure if related  Diarrhea?: no   Constipation:no   Dysuria or Hematuria: no    History:   Trauma: no   Previous similar pain: YES previous kidney issues    Precipitating factors:   Does the pain change with:     BM: no     Urination: no         Patient calling with hx of renal issues. States he believes he is having problems again as bilateral flank pain started last night. Advised patient he needs to be evaluated, states he is too dizzy to drive and does not have someone to drive him. Willing to be seen in clinic tomorrow, but cannot come in today because he is too dizzy (PCP does not have openings tomorrow). BP readings today were 85/48 then 110/84. Would need note for work if PCP states he needs time off. States he was given rx last time, wondering if that can be sent in again? Please advise how to proceed.     Triage to call with response [phone] okay to leave detailed message.     Cele Villarreal RN   [facility] - Triage

## 2017-03-06 NOTE — TELEPHONE ENCOUNTER
Need to be seen at ER or UC(at least) due to his complicated CHF hx to make sure if it is associated with CHF exacerbation. No note can be released until having assessment

## 2017-03-06 NOTE — MR AVS SNAPSHOT
After Visit Summary   3/6/2017    Vaibhav Crabtree    MRN: [id number]           Patient Information     Date Of Birth          1950        Visit Information        Provider Department      3/6/2017 2:15 PM Wellington Lemos, [facility]        Today's Diagnoses     Acute bilateral low back pain, with sciatica presence unspecified    -  1    Congestive heart failure, unspecified congestive heart failure chronicity, unspecified congestive heart failure type (H)        Acute bilateral low back pain without sciatica           Follow-ups after your visit        Your next 10 appointments already scheduled     Mar 08, 2017 11:20 AM CST   Office Visit with Xander Lee MD   [facility] ([facility])    [address] [place] [id number] [phone]           Bring a current list of meds and any records pertaining to this visit.  For Physicals, please bring immunization records and any forms needing to be filled out.  Please arrive 10 minutes early to complete paperwork.              Who to contact     If you have questions or need follow up information about today's clinic visit or your schedule please contact [facility] directly at [phone].  Normal or non-critical lab and imaging results will be communicated to you by MyChart, letter or phone within 4 business days after the clinic has received the results. If you do not hear from us within 7 days, please contact the clinic through [email or web address] or phone. If you have a critical or abnormal lab result, we will notify you by phone as soon as possible.  Submit refill requests through [facility] or call your pharmacy and they will forward the refill request to us. Please allow 3 business days for your refill to be completed.          Additional Information About Your Visit        Nomesia Information     [facility]ia lets you send messages to your  "doctor, view your test results, renew your prescriptions, schedule appointments and more. To sign up, go to [email or web address] . Click on \"Log in\" on the left side of the screen, which will take you to the Welcome page. Then click on \"Sign up Now\" on the right side of the page.     You will be asked to enter the access code listed below, as well as some personal information. Please follow the directions to create your username and password.     Your access code is: [email or web address]  Expires: 3/21/2017 10:09 AM     Your access code will  in 90 days. If you need help or a new code, please call your [place] clinic or [phone].        Care EveryWhere ID     This is your Care EveryWhere ID. This could be used by other organizations to access your [place] medical records  [id number]        Your Vitals Were     Pulse Temperature Height Pulse Oximetry BMI (Body Mass Index)       86 98.4  F (36.9  C) 5' 7\" (1.702 m) 94% 26.92 kg/m2        Blood Pressure from Last 3 Encounters:   17 122/70   17 124/82   17 110/70    Weight from Last 3 Encounters:   17 171 lb 14.4 oz (78 kg)   17 171 lb 12.8 oz (77.9 kg)   17 160 lb (72.6 kg)              We Performed the Following     **Basic metabolic panel FUTURE 14d     *UA reflex to Microscopic     CBC with platelets differential          Today's Medication Changes          These changes are accurate as of: 3/6/17  4:21 PM.  If you have any questions, ask your nurse or doctor.               Start taking these medicines.        Dose/Directions    HYDROcodone-acetaminophen 5-325 MG per tablet   Commonly known as:  NORCO   Used for:  Acute bilateral low back pain, with sciatica presence unspecified   Started by:  Wellington Lemos PA-C        Dose:  1 tablet   Take 1 tablet by mouth every 6 hours as needed   Quantity:  10 tablet   Refills:  0       methocarbamol 500 MG tablet   Commonly known as:  ROBAXIN   Used for:  Acute bilateral low back " pain without sciatica   Started by:  Wellington Lemos PA-C        Dose:  500 mg   Take 1 tablet (500 mg) by mouth 3 times daily as needed for muscle spasms   Quantity:  21 tablet   Refills:  0            Where to get your medicines      Some of these will need a paper prescription and others can be bought over the counter.  Ask your nurse if you have questions.     Bring a paper prescription for each of these medications     HYDROcodone-acetaminophen 5-325 MG per tablet    methocarbamol 500 MG tablet                Primary Care Provider Office Phone # Fax #    Xander DEMETRIUS Lee [phone]       [address] [address]        Thank you!     Thank you for choosing [facility]  for your care. Our goal is always to provide you with excellent care. Hearing back from our patients is one way we can continue to improve our services. Please take a few minutes to complete the written survey that you may receive in the mail after your visit with us. Thank you!             Your Updated Medication List - Protect others around you: Learn how to safely use, store and throw away your medicines at www.disposemymeds.org.          This list is accurate as of: 3/6/17  4:21 PM.  Always use your most recent med list.                   Brand Name Dispense Instructions for use    aspirin 325 MG tablet      Take 325 mg by mouth daily       carvedilol 25 MG tablet    COREG    180 tablet    Take 1 tablet (25 mg) by mouth 2 times daily (with meals)       furosemide 20 MG tablet    LASIX    30 tablet    Take 1 tablet (20 mg) by mouth daily as needed       HYDROcodone-acetaminophen 5-325 MG per tablet    NORCO    10 tablet    Take 1 tablet by mouth every 6 hours as needed       lisinopril 20 MG tablet    PRINIVIL/ZESTRIL    90 tablet    Take 1 tablet (20 mg) by mouth daily       methocarbamol 500 MG tablet    ROBAXIN    21 tablet    Take 1 tablet (500 mg) by  mouth 3 times daily as needed for muscle spasms       order for DME     1 Device    Equipment being ordered: hinged knee brace, large       TYLENOL PO      Take 650 mg by mouth every 4 hours as needed for mild pain or fever

## 2017-03-06 NOTE — TELEPHONE ENCOUNTER
Left detailed message for patient to return call and inform us he received voicemail and how he plans to proceed.  Clee Villarreal RN   [facility] - Triage

## 2017-03-06 NOTE — TELEPHONE ENCOUNTER
Left detail message advising ER or at least UC per Dr. Lee recommendation below.     Will attempt to call again at later time    Skye Wilkerson RN

## 2017-03-06 NOTE — TELEPHONE ENCOUNTER
Per chart review, patient seen by  today  Patient also call back stating that he is at  right now waiting for result.   Feeling a little better.     Skye Wilkerson RN

## 2017-03-06 NOTE — PROGRESS NOTES
SUBJECTIVE  HPI: Vaibhav Crabtree is a 66 year old male who presents for evaluation of back pain with spasms  Symptoms began 2 day(s) ago, have been onset gradual and are worse.  Pain is located in the low back bilateral region, with radiation to does not radiate, and are at worst a 5 on a scale of 1-10.  Recent injury:none recalled by the patient  Personal hx of back pain is recurrent self limited episodes of low back pain in the past.  Pain is exacerbated by: certain movements and twisting, bending.  Pain is relieved by: rest, ice   Associated sx include: spasms.  Red flag symptoms: negative, fever, chills, night sweats.    Past Medical History   Diagnosis Date     Bite by unspecified animal(E906.5)      CAD (coronary artery disease)      CTa 1/2015- Ca+ score of 237.02, showed 50-70% stenosis in LAD     Claudication of right lower extremity (H)      Congestive heart failure (H)      Hyperlipidemia      Hypertension      Idiopathic cardiomyopathy (H)      EF 30% on 5/2014 echo, EF improved to 50-55% on 1/12/15 echo     Mitral regurgitation      1+ on 1/2015 echo     Osteoarthritis      Allergies   Allergen Reactions     No Known Allergies      Social History   Substance Use Topics     Smoking status: Former Smoker     Packs/day: 4.00     Years: 15.00     Types: Cigarettes     Quit date: 6/20/1971     Smokeless tobacco: Never Used     Alcohol use 0.0 oz/week     0 Standard drinks or equivalent per week      Comment: occ. beer       ROS:  CONSTITUTIONAL:NEGATIVE for fever, chills, change in weight  INTEGUMENTARY/SKIN: NEGATIVE for worrisome rashes, moles or lesions  ENT/MOUTH: NEGATIVE for ear, mouth and throat problems  RESP:NEGATIVE for significant cough or SOB  CV: NEGATIVE for chest pain, palpitations or peripheral edema  GI: NEGATIVE for nausea, abdominal pain, heartburn, or change in bowel habits  : negative for dysuria, hematuria, decreased urinary stream, erectile dysfunction  MUSCULOSKELETAL: POSITIVE   "for lower back pain, tenderness, muscle tightness  NEURO: NEGATIVE for weakness, dizziness or paresthesias    OBJECTIVE:  /70  Pulse 86  Temp 98.4  F (36.9  C)  Ht 5' 7\" (1.702 m)  Wt 171 lb 14.4 oz (78 kg)  SpO2 94%  BMI 26.92 kg/m2  Back examination: Back symmetric, no curvature. ROM normal. No CVA tenderness., positive findings: paraspinal muscle spasm, tenderness to palpation lumbar area  STRAIGHT leg raise test: not done  GENERAL APPEARANCE: healthy, alert and no distress  ABDOMEN:  soft, nontender, no HSM or masses and bowel sounds normal  NEURO: Normal strength and tone with no weakness or sensory deficit noted, reflexes normal   MS:  decreased range of motion  and tender to palpation lower lumbar area  SKIN: no suspicious lesions or rashes    Results for orders placed or performed in visit on 03/06/17   CBC with platelets differential   Result Value Ref Range    WBC 5.0 4.0 - 11.0 10e9/L    RBC Count 4.03 (L) 4.4 - 5.9 10e12/L    Hemoglobin 10.5 (L) 13.3 - 17.7 g/dL    Hematocrit 34.6 (L) 40.0 - 53.0 %    MCV 86 78 - 100 fl    MCH 26.1 (L) 26.5 - 33.0 pg    MCHC 30.3 (L) 31.5 - 36.5 g/dL    RDW 18.4 (H) 10.0 - 15.0 %    Platelet Count 398 150 - 450 10e9/L    Diff Method Automated Method     % Neutrophils 59.3 %    % Lymphocytes 27.3 %    % Monocytes 8.2 %    % Eosinophils 4.2 %    % Basophils 1.0 %    Absolute Neutrophil 3.0 1.6 - 8.3 10e9/L    Absolute Lymphocytes 1.4 0.8 - 5.3 10e9/L    Absolute Monocytes 0.4 0.0 - 1.3 10e9/L    Absolute Eosinophils 0.2 0.0 - 0.7 10e9/L    Absolute Basophils 0.1 0.0 - 0.2 10e9/L   *UA reflex to Microscopic   Result Value Ref Range    Color Urine Yellow     Appearance Urine Clear     Glucose Urine Negative NEG mg/dL    Bilirubin Urine Negative NEG    Ketones Urine Negative NEG mg/dL    Specific Gravity Urine 1.020 1.003 - 1.035    Blood Urine Negative NEG    pH Urine 5.5 5.0 - 7.0 pH    Protein Albumin Urine Negative NEG mg/dL    Urobilinogen Urine 0.2 0.2 - 1.0 EU/dL "    Nitrite Urine Negative NEG    Leukocyte Esterase Urine Negative NEG    Source Midstream Urine    **Basic metabolic panel FUTURE 14d   Result Value Ref Range    Sodium 143 133 - 144 mmol/L    Potassium 4.1 3.4 - 5.3 mmol/L    Chloride 109 94 - 109 mmol/L    Carbon Dioxide 28 20 - 32 mmol/L    Anion Gap 6 3 - 14 mmol/L    Glucose 114 (H) 70 - 99 mg/dL    Urea Nitrogen 14 7 - 30 mg/dL    Creatinine 1.45 (H) 0.66 - 1.25 mg/dL    GFR Estimate 48 (L) >60 mL/min/1.7m2    GFR Estimate If Black 58 (L) >60 mL/min/1.7m2    Calcium 8.1 (L) 8.5 - 10.1 mg/dL     Chest xray Negative for acute findings, read by Wellington ALVARADO at time of visit.    ASSESSMENT/IMPRESSION/PLAN:      ICD-10-CM    1. Acute bilateral low back pain, with sciatica presence unspecified M54.5 CBC with platelets differential     *UA reflex to Microscopic     **Basic metabolic panel FUTURE 14d     HYDROcodone-acetaminophen (NORCO) 5-325 MG per tablet        2. Congestive heart failure, unspecified congestive heart failure chronicity, unspecified congestive heart failure type (H) I50.9 XR Chest 2 Views   3. Acute bilateral low back pain without sciatica M54.5 methocarbamol (ROBAXIN) 500 MG tablet       EDUCATION      1.  Continue stretching and strengthening exercises.       2.  Continue prn heat or ice application.    Follow up with PCP as needed

## 2017-03-08 ENCOUNTER — OFFICE VISIT (OUTPATIENT)
Dept: FAMILY MEDICINE | Facility: CLINIC | Age: 67
End: 2017-03-08
Payer: COMMERCIAL

## 2017-03-08 VITALS
HEART RATE: 75 BPM | OXYGEN SATURATION: 99 % | BODY MASS INDEX: 26.84 KG/M2 | TEMPERATURE: 97.5 F | WEIGHT: 171 LBS | DIASTOLIC BLOOD PRESSURE: 72 MMHG | HEIGHT: 67 IN | SYSTOLIC BLOOD PRESSURE: 138 MMHG

## 2017-03-08 DIAGNOSIS — M62.830 BACK SPASM: Primary | ICD-10-CM

## 2017-03-08 PROCEDURE — 99213 OFFICE O/P EST LOW 20 MIN: CPT | Performed by: FAMILY MEDICINE

## 2017-03-08 NOTE — PROGRESS NOTES
SUBJECTIVE:                                                    Vaibhav Crabtree is a 66 year old male who presents to clinic today for the following health issues:      Back Pain      Duration: x one week        Specific cause: possibly from sweeping and turning    Description:   Location of pain: low back both  Character of pain: sharp and dull ache  Pain radiation:none  New numbness or weakness in legs, not attributed to pain:  no     Intensity:     History:   Pain interferes with job: YES  History of back problems: recurrent self limited episodes of low back pain in the past  Any previous MRI or X-rays: None  Sees a specialist for back pain:  No  Therapies tried without relief:     Alleviating factors:   Improved by: muscle relaxants      Precipitating factors:  Worsened by: any kind of movement    Functional and Psychosocial Screen (Srikanth STarT Back):      Not performed today       Accompanying Signs & Symptoms:  Risk of Fracture:  None  Risk of Cauda Equina:  None  Risk of Infection:  None  Risk of Cancer:  None  Risk of Ankylosing Spondylitis:  Onset at age <35, male, AND morning back stiffness. no                  Problem list and histories reviewed & adjusted, as indicated.  Additional history: as documented    Patient Active Problem List   Diagnosis     Dyspnea and respiratory abnormality     Impotence of organic origin     Adjustment reaction     CARDIOVASCULAR SCREENING; LDL GOAL LESS THAN 130     Pain, joint, knee, right     Advanced directives, counseling/discussion     Arthritis of knee, right     Unstable angina (H)     Idiopathic cardiomyopathy (H)     Congestive heart failure (H)     Hypertension     Hyperlipidemia     CAD (coronary artery disease)     Mitral regurgitation     Atypical chest pain     Claudication of right lower extremity (H)     RAYSHAWN (acute kidney injury) (H)     Orthostatic hypotension     Past Surgical History   Procedure Laterality Date     Hc repair rotator cuff,acute  02 and  03     left rotator cuff repair     Hc removal of tonsils,<11 y/o       Tonsils <10y.o.     C nonspecific procedure  2002     ulnar nerve     C nonspecific procedure       right rotator cuff repair     Esophagoscopy, gastroscopy, duodenoscopy (egd), combined N/A 1/24/2017     Procedure: COMBINED ESOPHAGOSCOPY, GASTROSCOPY, DUODENOSCOPY (EGD), BIOPSY SINGLE OR MULTIPLE;  Surgeon: Reji Mcghee MD;  Location:  GI       Social History   Substance Use Topics     Smoking status: Former Smoker     Packs/day: 4.00     Years: 15.00     Types: Cigarettes     Quit date: 6/20/1971     Smokeless tobacco: Never Used     Alcohol use 0.0 oz/week     0 Standard drinks or equivalent per week      Comment: occ. beer     Family History   Problem Relation Age of Onset     Respiratory Father      COPD     Unknown/Adopted Mother      CANCER Brother      CANCER Brother      Connective Tissue Disorder Paternal Grandfather      Depression Daughter      Eye Disorder Son          Current Outpatient Prescriptions   Medication Sig Dispense Refill     methocarbamol (ROBAXIN) 500 MG tablet Take 1 tablet (500 mg) by mouth 3 times daily as needed for muscle spasms 21 tablet 0     HYDROcodone-acetaminophen (NORCO) 5-325 MG per tablet Take 1 tablet by mouth every 6 hours as needed 10 tablet 0     lisinopril (PRINIVIL/ZESTRIL) 20 MG tablet Take 1 tablet (20 mg) by mouth daily 90 tablet 3     carvedilol (COREG) 25 MG tablet Take 1 tablet (25 mg) by mouth 2 times daily (with meals) 180 tablet 3     furosemide (LASIX) 20 MG tablet Take 1 tablet (20 mg) by mouth daily as needed 30 tablet 12     Acetaminophen (TYLENOL PO) Take 650 mg by mouth every 4 hours as needed for mild pain or fever       order for DME Equipment being ordered: hinged knee brace, large 1 Device 0     aspirin 325 MG tablet Take 325 mg by mouth daily       Allergies   Allergen Reactions     No Known Allergies      Recent Labs   Lab Test  03/06/17   1501  02/20/17   0904   01/20/17    "0957   01/02/17   0956  09/08/16   0736   01/12/15   0525  01/11/15   1240   05/28/14   0605   05/27/14   0255   LDL   --    --    --    --    --    --    --    --   96   --    --   69   --    --    HDL   --    --    --    --    --    --    --    --   77   --    --   109   --    --    TRIG   --    --    --    --    --    --    --    --   130   --    --   130   --    --    ALT   --    --    --   26   --   28  10   < >  34  43   --    --    --    --    CR  1.45*  1.36*   < >  1.61*   < >  3.92*  1.31*   < >  1.13  1.21   < >   --    < >  1.11   GFRESTIMATED  48*  52*   < >  43*   < >  15*  54*   < >  65  60*   < >   --    < >  66   GFRESTBLACK  58*  63   < >  52*   < >  19*  66   < >  78  72   < >   --    < >  80   POTASSIUM  4.1  4.8   < >  3.9   < >  3.2*  3.2*   < >  4.3  4.0   < >  3.7   < >   --    TSH   --    --    --    --    --    --    --    --    --   1.90   --    --    --   6.58*    < > = values in this interval not displayed.      BP Readings from Last 3 Encounters:   03/08/17 138/72   03/06/17 122/70   02/20/17 124/82    Wt Readings from Last 3 Encounters:   03/08/17 171 lb (77.6 kg)   03/06/17 171 lb 14.4 oz (78 kg)   02/20/17 171 lb 12.8 oz (77.9 kg)                    Reviewed and updated as needed this visit by clinical staff       Reviewed and updated as needed this visit by Provider         ROS:  Constitutional, HEENT, cardiovascular, pulmonary, gi and gu systems are negative, except as otherwise noted.    OBJECTIVE:                                                    /72 (BP Location: Left arm, Patient Position: Chair, Cuff Size: Adult Large)  Pulse 75  Temp 97.5  F (36.4  C) (Tympanic)  Ht 5' 7\" (1.702 m)  Wt 171 lb (77.6 kg)  SpO2 99%  BMI 26.78 kg/m2  Body mass index is 26.78 kg/(m^2).  GENERAL: healthy, alert and no distress  NECK: no adenopathy, no asymmetry, masses, or scars and thyroid normal to palpation  RESP: lungs clear to auscultation - no rales, rhonchi or wheezes  CV: regular " rate and rhythm, normal S1 S2, no S3 or S4, no murmur, click or rub, no peripheral edema and peripheral pulses strong  ABDOMEN: soft, nontender, no hepatosplenomegaly, no masses and bowel sounds normal  MS: no gross musculoskeletal defects noted, no edema         ASSESSMENT/PLAN:                                                    Vaibhav was seen today for back pain.    Diagnoses and all orders for this visit:    Back spasm      Still has moderate to severe spasm on back without radiculopathy, keep taking pain med with muscle relaxant  Will have him to try off work until 14th, and try to back to work on 15th      Xander Lee MD  [facility] [place]

## 2017-03-08 NOTE — NURSING NOTE
"Chief Complaint   Patient presents with     Back Pain       Initial /72 (BP Location: Left arm, Patient Position: Chair, Cuff Size: Adult Large)  Pulse 75  Temp 97.5  F (36.4  C) (Tympanic)  Ht 5' 7\" (1.702 m)  Wt 171 lb (77.6 kg)  SpO2 99%  BMI 26.78 kg/m2 Estimated body mass index is 26.78 kg/(m^2) as calculated from the following:    Height as of this encounter: 5' 7\" (1.702 m).    Weight as of this encounter: 171 lb (77.6 kg).  Medication Reconciliation: complete     Corrina Villa CMA      "

## 2017-03-08 NOTE — MR AVS SNAPSHOT
"              After Visit Summary   3/8/2017    Vaibhav Crabtree    MRN: [id number]           Patient Information     Date Of Birth          1950        Visit Information        Provider Department      3/8/2017 11:20 AM Xander Lee MD [facility] [place]        Today's Diagnoses     Back spasm    -  1       Follow-ups after your visit        Your next 10 appointments already scheduled     Mar 08, 2017 11:20 AM CST   Office Visit with Xander Lee MD   [facility] ([facility])    [address] [place] [id number]   [phone]           Bring a current list of meds and any records pertaining to this visit.  For Physicals, please bring immunization records and any forms needing to be filled out.  Please arrive 10 minutes early to complete paperwork.              Who to contact     If you have questions or need follow up information about today's clinic visit or your schedule please contact [facility] directly at [phone].  Normal or non-critical lab and imaging results will be communicated to you by [facility], letter or phone within 4 business days after the clinic has received the results. If you do not hear from us within 7 days, please contact the clinic through [facility] or phone. If you have a critical or abnormal lab result, we will notify you by phone as soon as possible.  Submit refill requests through [facility] or call your pharmacy and they will forward the refill request to us. Please allow 3 business days for your refill to be completed.          Additional Information About Your Visit        [facility] Information     [employer] lets you send messages to your doctor, view your test results, renew your prescriptions, schedule appointments and more. To sign up, go to [email or web address] . Click on \"Log in\" on the left side of the screen, which will take you to the Welcome page. Then click on \"Sign up Now\" on the right " "side of the page.     You will be asked to enter the access code listed below, as well as some personal information. Please follow the directions to create your username and password.     Your access code is: [email or web address]  Expires: 3/21/2017 10:09 AM     Your access code will  in 90 days. If you need help or a new code, please call your [facility] or [phone].        Care EveryWhere ID     This is your Care EveryWhere ID. This could be used by other organizations to access your [place] medical records  [id number]        Your Vitals Were     Pulse Temperature Height Pulse Oximetry BMI (Body Mass Index)       75 97.5  F (36.4  C) (Tympanic) 5' 7\" (1.702 m) 99% 26.78 kg/m2        Blood Pressure from Last 3 Encounters:   17 138/72   17 122/70   17 124/82    Weight from Last 3 Encounters:   17 171 lb (77.6 kg)   17 171 lb 14.4 oz (78 kg)   17 171 lb 12.8 oz (77.9 kg)              Today, you had the following     No orders found for display       Primary Care Provider Office Phone # Fax #    Xander Lee [phone]       [address] [address]        Thank you!     Thank you for [place] [facility] [place]. Our goal is always to provide you with excellent care. Hearing back from our patients is one way we can continue to improve our services. Please take a few minutes to complete the written survey that you may receive in the mail after your visit with us. Thank you!             Your Updated Medication List - Protect others around you: Learn how to safely use, store and throw away your medicines at www.disposemymeds.org.          This list is accurate as of: 3/8/17 11:06 AM.  Always use your most recent med list.                   Brand Name Dispense Instructions for use    aspirin 325 MG tablet      Take 325 mg by mouth daily       carvedilol 25 MG tablet    COREG    180 tablet "    Take 1 tablet (25 mg) by mouth 2 times daily (with meals)       furosemide 20 MG tablet    LASIX    30 tablet    Take 1 tablet (20 mg) by mouth daily as needed       HYDROcodone-acetaminophen 5-325 MG per tablet    NORCO    10 tablet    Take 1 tablet by mouth every 6 hours as needed       lisinopril 20 MG tablet    PRINIVIL/ZESTRIL    90 tablet    Take 1 tablet (20 mg) by mouth daily       methocarbamol 500 MG tablet    ROBAXIN    21 tablet    Take 1 tablet (500 mg) by mouth 3 times daily as needed for muscle spasms       order for DME     1 Device    Equipment being ordered: hinged knee brace, large       TYLENOL PO      Take 650 mg by mouth every 4 hours as needed for mild pain or fever

## 2017-03-08 NOTE — LETTER
[address] [address]  Phone: [phone]    March 8, 2017        Vaibhav Crabtree  [address] [place] [id number]          To whom it may concern:    RE: Vaibhav Crabtree    Patient was seen and treated today at our clinic and missed work.  Patient may return to work March 15th, 2017 without restriction.     Please contact me for questions or concerns.      Sincerely,        Xander Lee MD

## 2017-04-10 ENCOUNTER — TELEPHONE (OUTPATIENT)
Dept: FAMILY MEDICINE | Facility: CLINIC | Age: 67
End: 2017-04-10

## 2017-04-13 ENCOUNTER — OFFICE VISIT (OUTPATIENT)
Dept: FAMILY MEDICINE | Facility: CLINIC | Age: 67
End: 2017-04-13
Payer: COMMERCIAL

## 2017-04-13 VITALS
SYSTOLIC BLOOD PRESSURE: 110 MMHG | BODY MASS INDEX: 25.74 KG/M2 | OXYGEN SATURATION: 97 % | HEIGHT: 67 IN | WEIGHT: 164 LBS | HEART RATE: 75 BPM | DIASTOLIC BLOOD PRESSURE: 60 MMHG | TEMPERATURE: 97.9 F

## 2017-04-13 DIAGNOSIS — M17.5 OTHER SECONDARY OSTEOARTHRITIS OF RIGHT KNEE: Primary | ICD-10-CM

## 2017-04-13 PROCEDURE — 99213 OFFICE O/P EST LOW 20 MIN: CPT | Performed by: FAMILY MEDICINE

## 2017-04-13 NOTE — NURSING NOTE
"Chief Complaint   Patient presents with     Knee Pain       Initial /60 (BP Location: Right arm, Patient Position: Chair, Cuff Size: Adult Regular)  Pulse 75  Temp 97.9  F (36.6  C) (Tympanic)  Ht 5' 7\" (1.702 m)  Wt 164 lb (74.4 kg)  SpO2 97%  BMI 25.69 kg/m2 Estimated body mass index is 25.69 kg/(m^2) as calculated from the following:    Height as of this encounter: 5' 7\" (1.702 m).    Weight as of this encounter: 164 lb (74.4 kg).  Medication Reconciliation:  Complete    Corrina Villa CMA      "

## 2017-04-13 NOTE — PROGRESS NOTES
"  SUBJECTIVE:                                                    Vaibhav Crabtree is a 66 year old male who presents to clinic today for the following health issues:      Joint Pain     Onset: ongoing for 4 years getting worse    Description:   Location: right knee  Character: Sharp    Intensity: severe    Progression of Symptoms: worse    Accompanying Signs & Symptoms:  Other symptoms: extreme pain unable to walk sometimes just standing is painful.  Knee is also now \"giving out\" per pt   History:   Previous similar pain: YES      Precipitating factors:   Trauma or overuse: YES- overuse    Alleviating factors:  Improved by: nothing       Therapies Tried and outcome: has had injections in knee    Problem list and histories reviewed & adjusted, as indicated.  Additional history: as documented    Patient Active Problem List   Diagnosis     Dyspnea and respiratory abnormality     Impotence of organic origin     Adjustment reaction     CARDIOVASCULAR SCREENING; LDL GOAL LESS THAN 130     Pain, joint, knee, right     Advanced directives, counseling/discussion     Arthritis of knee, right     Unstable angina (H)     Idiopathic cardiomyopathy (H)     Congestive heart failure (H)     Hypertension     Hyperlipidemia     CAD (coronary artery disease)     Mitral regurgitation     Atypical chest pain     Claudication of right lower extremity (H)     RAYSHAWN (acute kidney injury) (H)     Orthostatic hypotension     Past Surgical History:   Procedure Laterality Date     C NONSPECIFIC PROCEDURE  2002    ulnar nerve     C NONSPECIFIC PROCEDURE      right rotator cuff repair     ESOPHAGOSCOPY, GASTROSCOPY, DUODENOSCOPY (EGD), COMBINED N/A 1/24/2017    Procedure: COMBINED ESOPHAGOSCOPY, GASTROSCOPY, DUODENOSCOPY (EGD), BIOPSY SINGLE OR MULTIPLE;  Surgeon: Reji Mcghee MD;  Location: [facility]     HC REMOVAL OF TONSILS,<11 Y/O      Tonsils <10y.o.     HC REPAIR ROTATOR CUFF,ACUTE  02 and 03    left rotator cuff repair       Social History "   Substance Use Topics     Smoking status: Former Smoker     Packs/day: 4.00     Years: 15.00     Types: Cigarettes     Quit date: 6/20/1971     Smokeless tobacco: Never Used     Alcohol use 0.0 oz/week     0 Standard drinks or equivalent per week      Comment: caitlin maloney     Family History   Problem Relation Age of Onset     Respiratory Father      COPD     Unknown/Adopted Mother      CANCER Brother      CANCER Brother      Connective Tissue Disorder Paternal Grandfather      Depression Daughter      Eye Disorder Son          Current Outpatient Prescriptions   Medication Sig Dispense Refill     methocarbamol (ROBAXIN) 500 MG tablet Take 1 tablet (500 mg) by mouth 3 times daily as needed for muscle spasms 21 tablet 0     HYDROcodone-acetaminophen (NORCO) 5-325 MG per tablet Take 1 tablet by mouth every 6 hours as needed 10 tablet 0     lisinopril (PRINIVIL/ZESTRIL) 20 MG tablet Take 1 tablet (20 mg) by mouth daily 90 tablet 3     carvedilol (COREG) 25 MG tablet Take 1 tablet (25 mg) by mouth 2 times daily (with meals) 180 tablet 3     furosemide (LASIX) 20 MG tablet Take 1 tablet (20 mg) by mouth daily as needed 30 tablet 12     Acetaminophen (TYLENOL PO) Take 650 mg by mouth every 4 hours as needed for mild pain or fever       order for DME Equipment being ordered: hinged knee brace, large 1 Device 0     aspirin 325 MG tablet Take 325 mg by mouth daily       Allergies   Allergen Reactions     No Known Allergies      Recent Labs   Lab Test  03/06/17   1501  02/20/17   0904   01/20/17   0957   01/02/17   0956  09/08/16   0736   01/12/15   0525  01/11/15   1240   05/28/14   0605   05/27/14   0255   LDL   --    --    --    --    --    --    --    --   96   --    --   69   --    --    HDL   --    --    --    --    --    --    --    --   77   --    --   109   --    --    TRIG   --    --    --    --    --    --    --    --   130   --    --   130   --    --    ALT   --    --    --   26   --   28  10   < >  34  43   --    --  "   --    --    CR  1.45*  1.36*   < >  1.61*   < >  3.92*  1.31*   < >  1.13  1.21   < >   --    < >  1.11   GFRESTIMATED  48*  52*   < >  43*   < >  15*  54*   < >  65  60*   < >   --    < >  66   GFRESTBLACK  58*  63   < >  52*   < >  19*  66   < >  78  72   < >   --    < >  80   POTASSIUM  4.1  4.8   < >  3.9   < >  3.2*  3.2*   < >  4.3  4.0   < >  3.7   < >   --    TSH   --    --    --    --    --    --    --    --    --   1.90   --    --    --   6.58*    < > = values in this interval not displayed.      BP Readings from Last 3 Encounters:   04/13/17 110/60   03/08/17 138/72   03/06/17 122/70    Wt Readings from Last 3 Encounters:   04/13/17 164 lb (74.4 kg)   03/08/17 171 lb (77.6 kg)   03/06/17 171 lb 14.4 oz (78 kg)                    Reviewed and updated as needed this visit by clinical staff       Reviewed and updated as needed this visit by Provider         ROS:  Constitutional, HEENT, cardiovascular, pulmonary, gi and gu systems are negative, except as otherwise noted.    OBJECTIVE:                                                    /60 (BP Location: Right arm, Patient Position: Chair, Cuff Size: Adult Regular)  Pulse 75  Temp 97.9  F (36.6  C) (Tympanic)  Ht 5' 7\" (1.702 m)  Wt 164 lb (74.4 kg)  SpO2 97%  BMI 25.69 kg/m2  Body mass index is 25.69 kg/(m^2).  GENERAL: healthy, alert and no distress  NECK: no adenopathy, no asymmetry, masses, or scars and thyroid normal to palpation  RESP: lungs clear to auscultation - no rales, rhonchi or wheezes  CV: regular rate and rhythm, normal S1 S2, no S3 or S4, no murmur, click or rub, no peripheral edema and peripheral pulses strong  ABDOMEN: soft, nontender, no hepatosplenomegaly, no masses and bowel sounds normal  MS: no gross musculoskeletal defects noted, no edema         ASSESSMENT/PLAN:                                                    Vaibhav was seen today for knee pain.    Diagnoses and all orders for this visit:    Other secondary " osteoarthritis of right knee  -     ORTHO  REFERRAL      Right knee DJD has been worsening, been trying PT/chiropractic manipulation/seeing sports medicine/Kenalog IJ, and unfortunately failed to control pain and maintaining function   He work is quite physical with repetitive bending/squatting/kneeling   MRI done at 2015, and showed DJD and chondromalacia   Will have him to see ortho surgery for further evaluation and proper management      Xander Lee MD  [facility]

## 2017-04-13 NOTE — MR AVS SNAPSHOT
After Visit Summary   4/13/2017    Vaibhav Crabtree    MRN: [id number]           Patient Information     Date Of Birth          1950        Visit Information        Provider Department      4/13/2017 9:40 AM Xander Lee MD [facility] [place]        Today's Diagnoses     Other secondary osteoarthritis of right knee    -  1       Follow-ups after your visit        Additional Services     ORTHO  REFERRAL       [facility] is referring you to the Orthopedic  Services at [facility].       The  Representative will assist you in the coordination of your Orthopedic and Musculoskeletal Care as prescribed by your physician.    The  Representative will call you within 1 business day to help schedule your appointment, or you may contact the  Representative at:    All areas ~ [phone]     Type of Referral : Surgical / Specialist       Timeframe requested: Routine    Coverage of these services is subject to the terms and limitations of your health insurance plan.  Please call member services at your health plan with any benefit or coverage questions.      If X-rays, CT or MRI's have been performed, please contact the facility where they were done to arrange for , prior to your scheduled appointment.  Please bring this referral request to your appointment and present it to your specialist.                  Who to contact     If you have questions or need follow up information about today's clinic visit or your schedule please contact [facility] [place] directly at [phone].  Normal or non-critical lab and imaging results will be communicated to you by MyChart, letter or phone within 4 business days after the clinic has received the results. If you do not hear from us within 7 days, please contact the clinic through MyChart or phone. If you have a critical or abnormal lab result, we will  "notify you by phone as soon as possible.  Submit refill requests through Tales2Go or call your pharmacy and they will forward the refill request to us. Please allow 3 business days for your refill to be completed.          Additional Information About Your Visit        Venyohart Information     Tales2Go lets you send messages to your doctor, view your test results, renew your prescriptions, schedule appointments and more. To sign up, go to [email or web address] . Click on \"Log in\" on the left side of the screen, which will take you to the Welcome page. Then click on \"Sign up Now\" on the right side of the page.     You will be asked to enter the access code listed below, as well as some personal information. Please follow the directions to create your username and password.     Your access code is: [email or web address]  Expires: 2017  9:53 AM     Your access code will  in 90 days. If you need help or a new code, please call your [place] clinic or [phone].        Care EveryWhere ID     This is your [facility] EveryWhere ID. This could be used by other organizations to access your [place] medical records  [id number]        Your Vitals Were     Pulse Temperature Height Pulse Oximetry BMI (Body Mass Index)       75 97.9  F (36.6  C) (Tympanic) 5' 7\" (1.702 m) 97% 25.69 kg/m2        Blood Pressure from Last 3 Encounters:   17 110/60   17 138/72   17 122/70    Weight from Last 3 Encounters:   17 164 lb (74.4 kg)   17 171 lb (77.6 kg)   17 171 lb 14.4 oz (78 kg)              We Performed the Following     ORTHO  REFERRAL        Primary Care Provider Office Phone # Fax #    Xander Lee [phone]       [address] [address]        Thank you!     Thank you for choosing [facility]  for your care. Our goal is always to provide you with excellent care. Hearing back from our patients is one way we " can continue to improve our services. Please take a few minutes to complete the written survey that you may receive in the mail after your visit with us. Thank you!             Your Updated Medication List - Protect others around you: Learn how to safely use, store and throw away your medicines at www.disposemymeds.org.          This list is accurate as of: 4/13/17  9:53 AM.  Always use your most recent med list.                   Brand Name Dispense Instructions for use    aspirin 325 MG tablet      Take 325 mg by mouth daily       carvedilol 25 MG tablet    COREG    180 tablet    Take 1 tablet (25 mg) by mouth 2 times daily (with meals)       furosemide 20 MG tablet    LASIX    30 tablet    Take 1 tablet (20 mg) by mouth daily as needed       HYDROcodone-acetaminophen 5-325 MG per tablet    NORCO    10 tablet    Take 1 tablet by mouth every 6 hours as needed       lisinopril 20 MG tablet    PRINIVIL/ZESTRIL    90 tablet    Take 1 tablet (20 mg) by mouth daily       methocarbamol 500 MG tablet    ROBAXIN    21 tablet    Take 1 tablet (500 mg) by mouth 3 times daily as needed for muscle spasms       order for DME     1 Device    Equipment being ordered: hinged knee brace, large       TYLENOL PO      Take 650 mg by mouth every 4 hours as needed for mild pain or fever

## 2017-04-15 ENCOUNTER — OFFICE VISIT (OUTPATIENT)
Dept: URGENT CARE | Facility: URGENT CARE | Age: 67
End: 2017-04-15
Payer: COMMERCIAL

## 2017-04-15 VITALS
TEMPERATURE: 98.2 F | OXYGEN SATURATION: 95 % | DIASTOLIC BLOOD PRESSURE: 50 MMHG | HEART RATE: 68 BPM | HEIGHT: 67 IN | BODY MASS INDEX: 25.57 KG/M2 | SYSTOLIC BLOOD PRESSURE: 82 MMHG | WEIGHT: 162.9 LBS

## 2017-04-15 DIAGNOSIS — I95.9 HYPOTENSION, UNSPECIFIED HYPOTENSION TYPE: ICD-10-CM

## 2017-04-15 DIAGNOSIS — R42 DIZZINESS: Primary | ICD-10-CM

## 2017-04-15 PROCEDURE — 99213 OFFICE O/P EST LOW 20 MIN: CPT | Performed by: FAMILY MEDICINE

## 2017-04-15 RX ORDER — LISINOPRIL 10 MG/1
10 TABLET ORAL DAILY
Qty: 30 TABLET | Refills: 1 | Status: SHIPPED | OUTPATIENT
Start: 2017-04-15 | End: 2017-04-25

## 2017-04-15 NOTE — NURSING NOTE
"Chief Complaint   Patient presents with     Dizziness     Pt c/o intermittent dizziness X 2 days.     Urgent Care       Initial BP (!) 82/50  Pulse 68  Temp 98.2  F (36.8  C)  Ht 5' 7\" (1.702 m)  Wt 162 lb 14.4 oz (73.9 kg)  SpO2 95%  BMI 25.51 kg/m2 Estimated body mass index is 25.51 kg/(m^2) as calculated from the following:    Height as of this encounter: 5' 7\" (1.702 m).    Weight as of this encounter: 162 lb 14.4 oz (73.9 kg).  Medication Reconciliation: complete    "

## 2017-04-15 NOTE — MR AVS SNAPSHOT
"              After Visit Summary   4/15/2017    Vaibhav Crabtree    MRN: [id number]           Patient Information     Date Of Birth          1950        Visit Information        Provider Department      4/15/2017 5:00 PM Shelley Gibson MD [facility]        Today's Diagnoses     Dizziness    -  1    Hypotension, unspecified hypotension type           Follow-ups after your visit        Who to contact     If you have questions or need follow up information about today's clinic visit or your schedule please contact [facility] directly at [phone].  Normal or non-critical lab and imaging results will be communicated to you by Chamelichart, letter or phone within 4 business days after the clinic has received the results. If you do not hear from us within 7 days, please contact the clinic through Chamelichart or phone. If you have a critical or abnormal lab result, we will notify you by phone as soon as possible.  Submit refill requests through 3TIER or call your pharmacy and they will forward the refill request to us. Please allow 3 business days for your refill to be completed.          Additional Information About Your Visit        MyChart Information     3TIER lets you send messages to your doctor, view your test results, renew your prescriptions, schedule appointments and more. To sign up, go to [email or web address] . Click on \"Log in\" on the left side of the screen, which will take you to the Welcome page. Then click on \"Sign up Now\" on the right side of the page.     You will be asked to enter the access code listed below, as well as some personal information. Please follow the directions to create your username and password.     Your access code is: [email or web address]  Expires: 2017  9:53 AM     Your access code will  in 90 days. If you need help or a new code, please call your [place] clinic or [phone].        Care EveryWhere ID     This " "is your Care EveryWhere ID. This could be used by other organizations to access your [place] medical records  [id number]        Your Vitals Were     Pulse Temperature Height Pulse Oximetry BMI (Body Mass Index)       68 98.2  F (36.8  C) 5' 7\" (1.702 m) 95% 25.51 kg/m2        Blood Pressure from Last 3 Encounters:   04/15/17 (!) 82/50   04/13/17 110/60   03/08/17 138/72    Weight from Last 3 Encounters:   04/15/17 162 lb 14.4 oz (73.9 kg)   04/13/17 164 lb (74.4 kg)   03/08/17 171 lb (77.6 kg)              Today, you had the following     No orders found for display         Today's Medication Changes          These changes are accurate as of: 4/15/17  5:59 PM.  If you have any questions, ask your nurse or doctor.               These medicines have changed or have updated prescriptions.        Dose/Directions    lisinopril 10 MG tablet   Commonly known as:  PRINIVIL/ZESTRIL   This may have changed:    - medication strength  - how much to take   Used for:  Hypotension, unspecified hypotension type   Changed by:  Shelley Gibson MD        Dose:  10 mg   Take 1 tablet (10 mg) by mouth daily   Quantity:  30 tablet   Refills:  1            Where to get your medicines      These medications were sent to [facility] [id number] [place] [address] [address] AT [facility] [address]  [address], [facility] [address]    Hours:  24-hours Phone:  [phone]     lisinopril 10 MG tablet                Primary Care Provider Office Phone # Fax #    Xander DEMETRIUS Lee [phone]       [place] [address] [facility] [address]        Thank you!     Thank you for choosing [facility]  for your care. Our goal is always to provide you with excellent care. Hearing back from our patients is one way we can continue to improve our services. Please take a few minutes to complete the written survey that you may receive in the mail after your visit " with us. Thank you!             Your Updated Medication List - Protect others around you: Learn how to safely use, store and throw away your medicines at www.disposemymeds.org.          This list is accurate as of: 4/15/17  5:59 PM.  Always use your most recent med list.                   Brand Name Dispense Instructions for use    aspirin 325 MG tablet      Take 325 mg by mouth daily       carvedilol 25 MG tablet    COREG    180 tablet    Take 1 tablet (25 mg) by mouth 2 times daily (with meals)       furosemide 20 MG tablet    LASIX    30 tablet    Take 1 tablet (20 mg) by mouth daily as needed       lisinopril 10 MG tablet    PRINIVIL/ZESTRIL    30 tablet    Take 1 tablet (10 mg) by mouth daily       TYLENOL PO      Take 650 mg by mouth every 4 hours as needed for mild pain or fever

## 2017-04-15 NOTE — PROGRESS NOTES
Chief Complaint   Patient presents with     Dizziness     Pt c/o intermittent dizziness X 2 days.     Urgent Care     SUBJECTIVE:  Vaibhav Crabtree, a 66 year old male with h/o CAD , CHF scheduled an appointment to discuss the following issues:     Dizziness  Hypotension, unspecified hypotension type     He presents with his neighbor for symptoms of intermittent dizziness for 2 days which last for few mins , he did not have any associated nausea or vomiting with it   Also did not have any LOC or vision changes   He was having some dizziness today and bp check showed a bp of 85/60s at home and he fell on the ground as he was about to hold on to the countertop when his knees gave away   He denies any chest pain or sob or chest palpitations ,or chest heaviness  he denies any dizziness now , he is on 2 bp med and also on lasix   Discussed with pt about considering reducing dose of bp pill .    Past Medical History:   Diagnosis Date     Bite by unspecified animal(E906.5)      CAD (coronary artery disease)     CTa 1/2015- Ca+ score of 237.02, showed 50-70% stenosis in LAD     Claudication of right lower extremity (H)      Congestive heart failure (H)      Hyperlipidemia      Hypertension      Idiopathic cardiomyopathy (H)     EF 30% on 5/2014 echo, EF improved to 50-55% on 1/12/15 echo     Mitral regurgitation     1+ on 1/2015 echo     Osteoarthritis       Past Surgical History:   Procedure Laterality Date     C NONSPECIFIC PROCEDURE  2002    ulnar nerve     C NONSPECIFIC PROCEDURE      right rotator cuff repair     ESOPHAGOSCOPY, GASTROSCOPY, DUODENOSCOPY (EGD), COMBINED N/A 1/24/2017    Procedure: COMBINED ESOPHAGOSCOPY, GASTROSCOPY, DUODENOSCOPY (EGD), BIOPSY SINGLE OR MULTIPLE;  Surgeon: Reji Mcghee MD;  Location:  GI     HC REMOVAL OF TONSILS,<13 Y/O      Tonsils <10y.o.     HC REPAIR ROTATOR CUFF,ACUTE  02 and 03    left rotator cuff repair        Social History     Social History     Marital status:       Spouse name: lucian     Number of children: 2     Years of education: 9     Occupational History     [employer] man       [employer]     Social History Main Topics     Smoking status: Former Smoker     Packs/day: 4.00     Years: 15.00     Types: Cigarettes     Quit date: 6/20/1971     Smokeless tobacco: Never Used     Alcohol use 0.0 oz/week     0 Standard drinks or equivalent per week      Comment: occ. beer     Drug use: No     Sexual activity: Yes     Partners: Female     Other Topics Concern      Service No     Blood Transfusions No     Caffeine Concern No     Occupational Exposure No     Hobby Hazards No     Sleep Concern Yes     Waking with hot sweats     Stress Concern No     Weight Concern No     Special Diet No     Back Care No     Exercise Yes     Walks all day     Bike Helmet No     Seat Belt Yes     Self-Exams No     Social History Narrative        Current Outpatient Prescriptions   Medication Sig Dispense Refill     lisinopril (PRINIVIL/ZESTRIL) 10 MG tablet Take 1 tablet (10 mg) by mouth daily 30 tablet 1     carvedilol (COREG) 25 MG tablet Take 1 tablet (25 mg) by mouth 2 times daily (with meals) 180 tablet 3     furosemide (LASIX) 20 MG tablet Take 1 tablet (20 mg) by mouth daily as needed 30 tablet 12     Acetaminophen (TYLENOL PO) Take 650 mg by mouth every 4 hours as needed for mild pain or fever       aspirin 325 MG tablet Take 325 mg by mouth daily       [DISCONTINUED] lisinopril (PRINIVIL/ZESTRIL) 20 MG tablet Take 1 tablet (20 mg) by mouth daily 90 tablet 3       Health Maintenance   Topic Date Due     HEPATITIS C SCREENING  06/20/1966     COLON CANCER SCREEN (SYSTEM ASSIGNED)  06/20/1998     PNEUMOCOCCAL (2 of 2 - PCV13) 01/12/2016     INFLUENZA VACCINE (SYSTEM ASSIGNED)  09/01/2017     ADVANCE DIRECTIVE PLANNING Q5 YRS (NO INBASKET)  09/10/2017     FALL RISK ASSESSMENT  12/21/2017     LIPID SCREEN Q5 YR MALE (SYSTEM ASSIGNED)  01/12/2020     TETANUS IMMUNIZATION (SYSTEM ASSIGNED)   "09/10/2022     AORTIC ANEURYSM SCREENING (SYSTEM ASSIGNED)  Completed        ROS:  CONSTITUTIONAL:no fever, no chills or sweats, no excessive fatigue, no significant change in weight  CV: low BP  RESP -neg  GI:  Neg   NEURO: neg   MSK - neg   Skin - neg   Pyschiatry-neg     OBJECTIVE:  BP (!) 82/50  Pulse 68  Temp 98.2  F (36.8  C)  Ht 5' 7\" (1.702 m)  Wt 162 lb 14.4 oz (73.9 kg)  SpO2 95%  BMI 25.51 kg/m2      EXAM:  GENERAL APPEARANCE: healthy, alert and no distress  EYES: EOMI,  PERRL  HENT: ear canals and TM's normal and nose and mouth without ulcers or lesions  RESP: lungs clear to auscultation - no rales, rhonchi or wheezes  CV: regular rates and rhythm, normal S1 S2, no S3 or S4 and no murmur, click or rub -  ABDOMEN:  soft, nontender, no HSM or masses and bowel sounds normal  NEURO: Normal strength and tone, sensory exam grossly normal, mentation intact and speech normal        ASSESSMENT/PLAN:  Vaibhav was seen today for dizziness and urgent care.    Diagnoses and all orders for this visit:    Dizziness    Hypotension, unspecified hypotension type  -     lisinopril (PRINIVIL/ZESTRIL) 10 MG tablet; Take 1 tablet (10 mg) by mouth daily    reduced dose of lisinopril to 10 mg po daily instead of 20 mg   Discussed with pt to continue checking his BP numbers   Discussed with pt to watch for any worsening symptoms   If has any further dizziness or any chest symptoms should f/u in ER   I did discuss with his neighbor to monitor his BP numbers   I called pt after 1/2 and hr his bp was 99/60  He had no more dizziness   Discussed with pt if notices any worsening dizziness or chest symptoms should follow up in ER   He would f/u with PCP in 2  Days       Follow up if  symptoms fail to improve or worsens   Pt understood and agreed with plan       Spent>25 minutes with patient and > 50% of the time was for counselling       Shelley Gibson MD       "

## 2017-04-16 ENCOUNTER — HOSPITAL ENCOUNTER (EMERGENCY)
Facility: CLINIC | Age: 67
Discharge: HOME | End: 2017-04-16
Attending: EMERGENCY MEDICINE | Admitting: EMERGENCY MEDICINE
Payer: COMMERCIAL

## 2017-04-16 VITALS
RESPIRATION RATE: 16 BRPM | HEIGHT: 67 IN | SYSTOLIC BLOOD PRESSURE: 108 MMHG | TEMPERATURE: 98.1 F | HEART RATE: 81 BPM | OXYGEN SATURATION: 99 % | BODY MASS INDEX: 25.39 KG/M2 | WEIGHT: 161.8 LBS | DIASTOLIC BLOOD PRESSURE: 76 MMHG

## 2017-04-16 DIAGNOSIS — I95.1 ORTHOSTATIC HYPOTENSION: ICD-10-CM

## 2017-04-16 DIAGNOSIS — D64.9 ANEMIA, UNSPECIFIED TYPE: ICD-10-CM

## 2017-04-16 DIAGNOSIS — N17.9 AKI (ACUTE KIDNEY INJURY) (H): ICD-10-CM

## 2017-04-16 LAB
ABO + RH BLD: NORMAL
ABO + RH BLD: NORMAL
ALBUMIN SERPL-MCNC: 3.5 G/DL (ref 3.4–5)
ALP SERPL-CCNC: 49 U/L (ref 40–150)
ALT SERPL W P-5'-P-CCNC: 31 U/L (ref 0–70)
ANION GAP SERPL CALCULATED.3IONS-SCNC: 7 MMOL/L (ref 3–14)
AST SERPL W P-5'-P-CCNC: 19 U/L (ref 0–45)
BILIRUB DIRECT SERPL-MCNC: 0.2 MG/DL (ref 0–0.2)
BILIRUB SERPL-MCNC: 0.4 MG/DL (ref 0.2–1.3)
BLD GP AB SCN SERPL QL: NORMAL
BLOOD BANK CMNT PATIENT-IMP: NORMAL
BUN SERPL-MCNC: 43 MG/DL (ref 7–30)
CALCIUM SERPL-MCNC: 8.3 MG/DL (ref 8.5–10.1)
CHLORIDE SERPL-SCNC: 103 MMOL/L (ref 94–109)
CO2 SERPL-SCNC: 32 MMOL/L (ref 20–32)
CREAT SERPL-MCNC: 2.06 MG/DL (ref 0.66–1.25)
ERYTHROCYTE [DISTWIDTH] IN BLOOD BY AUTOMATED COUNT: 18.9 % (ref 10–15)
GFR SERPL CREATININE-BSD FRML MDRD: 32 ML/MIN/1.7M2
GLUCOSE SERPL-MCNC: 95 MG/DL (ref 70–99)
HCT VFR BLD AUTO: 30.5 % (ref 40–53)
HGB BLD-MCNC: 9.3 G/DL (ref 13.3–17.7)
INTERPRETATION ECG - MUSE: NORMAL
LIPASE SERPL-CCNC: 245 U/L (ref 73–393)
MCH RBC QN AUTO: 26.1 PG (ref 26.5–33)
MCHC RBC AUTO-ENTMCNC: 30.5 G/DL (ref 31.5–36.5)
MCV RBC AUTO: 86 FL (ref 78–100)
PLATELET # BLD AUTO: 392 10E9/L (ref 150–450)
POTASSIUM SERPL-SCNC: 3.2 MMOL/L (ref 3.4–5.3)
PROT SERPL-MCNC: 6.4 G/DL (ref 6.8–8.8)
RBC # BLD AUTO: 3.56 10E12/L (ref 4.4–5.9)
SODIUM SERPL-SCNC: 142 MMOL/L (ref 133–144)
SPECIMEN EXP DATE BLD: NORMAL
TROPONIN I SERPL-MCNC: NORMAL UG/L (ref 0–0.04)
WBC # BLD AUTO: 6.9 10E9/L (ref 4–11)

## 2017-04-16 PROCEDURE — 83690 ASSAY OF LIPASE: CPT | Performed by: EMERGENCY MEDICINE

## 2017-04-16 PROCEDURE — 86901 BLOOD TYPING SEROLOGIC RH(D): CPT | Performed by: EMERGENCY MEDICINE

## 2017-04-16 PROCEDURE — 85027 COMPLETE CBC AUTOMATED: CPT | Performed by: EMERGENCY MEDICINE

## 2017-04-16 PROCEDURE — 80076 HEPATIC FUNCTION PANEL: CPT | Performed by: EMERGENCY MEDICINE

## 2017-04-16 PROCEDURE — 96360 HYDRATION IV INFUSION INIT: CPT

## 2017-04-16 PROCEDURE — 84484 ASSAY OF TROPONIN QUANT: CPT | Performed by: EMERGENCY MEDICINE

## 2017-04-16 PROCEDURE — 99284 EMERGENCY DEPT VISIT MOD MDM: CPT | Mod: 25

## 2017-04-16 PROCEDURE — 25000128 H RX IP 250 OP 636: Performed by: EMERGENCY MEDICINE

## 2017-04-16 PROCEDURE — 86850 RBC ANTIBODY SCREEN: CPT | Performed by: EMERGENCY MEDICINE

## 2017-04-16 PROCEDURE — 86900 BLOOD TYPING SEROLOGIC ABO: CPT | Performed by: EMERGENCY MEDICINE

## 2017-04-16 PROCEDURE — 93005 ELECTROCARDIOGRAM TRACING: CPT

## 2017-04-16 PROCEDURE — 80048 BASIC METABOLIC PNL TOTAL CA: CPT | Performed by: EMERGENCY MEDICINE

## 2017-04-16 RX ADMIN — SODIUM CHLORIDE 500 ML: 9 INJECTION, SOLUTION INTRAVENOUS at 04:27

## 2017-04-16 ASSESSMENT — ENCOUNTER SYMPTOMS
BLOOD IN STOOL: 0
LIGHT-HEADEDNESS: 1
SHORTNESS OF BREATH: 0
FEVER: 0
ABDOMINAL PAIN: 1
VOMITING: 1

## 2017-04-16 NOTE — ED AVS SNAPSHOT
Emergency Department    [address] [place] [address]    Phone:  [phone]    Fax:  [phone]                                       Vaibhav Crabtree   MRN: [id number]    Department:   Emergency Department   Date of Visit:  4/16/2017           After Visit Summary Signature Page     I have received my discharge instructions, and my questions have been answered. I have discussed any challenges I see with this plan with the nurse or doctor.    ..........................................................................................................................................  Patient/Patient Representative Signature      ..........................................................................................................................................  Patient Representative Print Name and Relationship to Patient    ..................................................               ................................................  Date                                            Time    ..........................................................................................................................................  Reviewed by Signature/Title    ...................................................              ..............................................  Date                                                            Time

## 2017-04-16 NOTE — ED NOTES
Orthostatic blood pressures completed on patient. He reportes slight dizziness with standing. MD aware. Per patient when he fainted he was getting up from sitting to get ice water.

## 2017-04-16 NOTE — ED AVS SNAPSHOT
Emergency Department    [address] [facility] [address]    Phone:  [phone]    Fax:  [phone]                                       Vaibhav Crabtree   MRN: [id number]    Department:   Emergency Department   Date of Visit:  4/16/2017           Patient Information     Date Of Birth          1950        Your diagnoses for this visit were:     Orthostatic hypotension     RAYSHAWN (acute kidney injury) (H)     Anemia, unspecified type        You were seen by Leann Rose MD.      Follow-up Information     Schedule an appointment as soon as possible for a visit with Xander Lee MD.    Specialty:  Family Practice    Contact information:    [facility]  [address] [facility] [address] [phone]          Follow up with  Emergency Department.    Specialty:  EMERGENCY MEDICINE    Why:  If symptoms worsen    Contact information:    [address] [facility] [address] [phone]        Discharge Instructions       Your kidney function and hemoglobin were slightly low today  Recommend that you have them repeated by your primary care provider  Decrease the lisinopril to 10 mg daily as recommended by the doctor today  Decrease lasix as well to every other day   Start omeprazole which is an antacid medication, follow up with your doctor about other further recommendations  Orthostatic Low Blood Pressure (Hypotension)  A blood pressure reading is made up of 2 numbers There is a top number over a bottom number. The top number is the systolic pressure. The bottom number is the diastolic pressure. A normal blood pressure is less than 120 over less than 80. Low blood pressure (hypotension) is a blood pressure that is less than what is normal for you.  Orthostatic hypotension is a type of low blood pressure that occurs only when you change position from lying to standing. It can cause dizziness, lightheadedness, or fainting.  Medicines can  cause orthostatic hypotension. These include:    High blood pressure medicines    Water pills (diuretics)    Some heart medicines    Some antidepressants    Pain, anxiety, sedative, and sleeping medicines  Other causes include:    Dehydration from vomiting, diarrhea, or not getting enough fluids    Severe infection    High fever    Blood loss. This could be bleeding from the stomach or intestines.  Treatment will depend on what is causing your low blood pressure.  Home care  Follow these guidelines when caring for yourself at home:    Rest until your symptoms get better.    Change positions slowly from lying to standing. When getting out of bed, sit on the side of the bed with your legs down for at least 30 seconds before standing. This gives your body time to adjust to the position change.    Follow the treatment plan described by your health care provider.  Follow-up care  Follow up with your health care provider, or as advised.  When to seek medical advice  Call your health care provider right away if any of these occur:    Dizziness, lightheadedness, or fainting    Black or red color in your stools or vomit    Diarrhea or vomiting that doesn t go away    You aren t able to eat or drink    Fever of 100.4 F (38 C) or higher, or as directed by your health care provider    Burning when you urinate    Foul-smelling urine    6570-6220 [employer]. [address] [place] [address]. All rights reserved. This information is not intended as a substitute for professional medical care. Always follow your healthcare professional's instructions.          24 Hour Appointment Hotline       To make an appointment at any [facility] call [phone] ([phone]). If you don't have a family doctor or clinic, we will help you find one. [facility] are conveniently located to serve the needs of you and your family.             Review of your medicines      START taking        Dose / Directions  Last dose taken    omeprazole 20 MG CR capsule   Commonly known as:  priLOSEC   Dose:  20 mg   Quantity:  30 capsule        Take 1 capsule (20 mg) by mouth daily   Refills:  0          Our records show that you are taking the medicines listed below. If these are incorrect, please call your family doctor or clinic.        Dose / Directions Last dose taken    aspirin 325 MG tablet   Dose:  325 mg        Take 325 mg by mouth daily   Refills:  0        carvedilol 25 MG tablet   Commonly known as:  COREG   Dose:  25 mg   Quantity:  180 tablet        Take 1 tablet (25 mg) by mouth 2 times daily (with meals)   Refills:  3        furosemide 20 MG tablet   Commonly known as:  LASIX   Dose:  20 mg   Quantity:  30 tablet        Take 1 tablet (20 mg) by mouth daily as needed   Refills:  12        lisinopril 10 MG tablet   Commonly known as:  PRINIVIL/ZESTRIL   Dose:  10 mg   Quantity:  30 tablet        Take 1 tablet (10 mg) by mouth daily   Refills:  1                Prescriptions were sent or printed at these locations (1 Prescription)                   Other Prescriptions                Printed at Department/Unit printer (1 of 1)         omeprazole (PRILOSEC) 20 MG CR capsule                Procedures and tests performed during your visit     ABO/Rh type and screen    Basic metabolic panel    CBC (+ platelets, no diff)    EKG 12 lead    Hepatic panel    Lipase    Troponin I (now)      Orders Needing Specimen Collection     None      Pending Results     No orders found from 4/14/2017 to 4/17/2017.            Pending Culture Results     No orders found from 4/14/2017 to 4/17/2017.            Test Results From Your Hospital Stay        4/16/2017  3:51 AM      Component Results     Component Value Ref Range & Units Status    Sodium 142 133 - 144 mmol/L Final    Potassium 3.2 (L) 3.4 - 5.3 mmol/L Final    Chloride 103 94 - 109 mmol/L Final    Carbon Dioxide 32 20 - 32 mmol/L Final    Anion Gap 7 3 - 14 mmol/L Final    Glucose 95  70 - 99 mg/dL Final    Urea Nitrogen 43 (H) 7 - 30 mg/dL Final    Creatinine 2.06 (H) 0.66 - 1.25 mg/dL Final    GFR Estimate 32 (L) >60 mL/min/1.7m2 Final    Non  GFR Calc    GFR Estimate If Black 39 (L) >60 mL/min/1.7m2 Final    African American GFR Calc    Calcium 8.3 (L) 8.5 - 10.1 mg/dL Final         4/16/2017  4:03 AM      Component Results     Component Value Ref Range & Units Status    WBC 6.9 4.0 - 11.0 10e9/L Final    RBC Count 3.56 (L) 4.4 - 5.9 10e12/L Final    Hemoglobin 9.3 (L) 13.3 - 17.7 g/dL Final    Hematocrit 30.5 (L) 40.0 - 53.0 % Final    MCV 86 78 - 100 fl Final    MCH 26.1 (L) 26.5 - 33.0 pg Final    MCHC 30.5 (L) 31.5 - 36.5 g/dL Final    RDW 18.9 (H) 10.0 - 15.0 % Final    Platelet Count 392 150 - 450 10e9/L Final         4/16/2017  3:54 AM      Component Results     Component Value Ref Range & Units Status    Troponin I ES  0.000 - 0.045 ug/L Final    <0.015  The 99th percentile for upper reference range is 0.045 ug/L.  Troponin values in   the range of 0.045 - 0.120 ug/L may be associated with risks of adverse   clinical events.           4/16/2017  5:12 AM      Component Results     Component    ABO    A    RH(D)     Pos    Antibody Screen    Neg    Test Valid Only At    [facility]    Specimen Expires    04/19/2017 4/16/2017  4:39 AM      Component Results     Component Value Ref Range & Units Status    Bilirubin Direct 0.2 0.0 - 0.2 mg/dL Final    Bilirubin Total 0.4 0.2 - 1.3 mg/dL Final    Albumin 3.5 3.4 - 5.0 g/dL Final    Protein Total 6.4 (L) 6.8 - 8.8 g/dL Final    Alkaline Phosphatase 49 40 - 150 U/L Final    ALT 31 0 - 70 U/L Final    AST 19 0 - 45 U/L Final         4/16/2017  4:30 AM      Component Results     Component Value Ref Range & Units Status    Lipase 245 73 - 393 U/L Final                Clinical Quality Measure: Blood Pressure Screening     Your blood pressure was checked while you were in the emergency department today. The  "last reading we obtained was  BP: 108/73 . Please read the guidelines below about what these numbers mean and what you should do about them.  If your systolic blood pressure (the top number) is less than 120 and your diastolic blood pressure (the bottom number) is less than 80, then your blood pressure is normal. There is nothing more that you need to do about it.  If your systolic blood pressure (the top number) is 120-139 or your diastolic blood pressure (the bottom number) is 80-89, your blood pressure may be higher than it should be. You should have your blood pressure rechecked within a year by a primary care provider.  If your systolic blood pressure (the top number) is 140 or greater or your diastolic blood pressure (the bottom number) is 90 or greater, you may have high blood pressure. High blood pressure is treatable, but if left untreated over time it can put you at risk for heart attack, stroke, or kidney failure. You should have your blood pressure rechecked by a primary care provider within the next 4 weeks.  If your provider in the emergency department today gave you specific instructions to follow-up with your doctor or provider even sooner than that, you should follow that instruction and not wait for up to 4 weeks for your follow-up visit.        Thank you for choosing [facility]       Thank you for choosing [facility] for your care. Our goal is always to provide you with excellent care. Hearing back from our patients is one way we can continue to improve our services. Please take a few minutes to complete the written survey that you may receive in the mail after you visit with us. Thank you!        [employer] Information     [employer] lets you send messages to your doctor, view your test results, renew your prescriptions, schedule appointments and more. To sign up, go to [email or web address] . Click on \"Log in\" on the left side of the screen, which will take you to the Welcome page. Then click on \"Sign " "up Now\" on the right side of the page.     You will be asked to enter the access code listed below, as well as some personal information. Please follow the directions to create your username and password.     Your access code is: [email or web address]  Expires: 2017  9:53 AM     Your access code will  in 90 days. If you need help or a new code, please call your [place] clinic or [phone].        Care EveryWhere ID     This is your Care EveryWhere ID. This could be used by other organizations to access your [place] medical records  [id number]        After Visit Summary       This is your record. Keep this with you and show to your community pharmacist(s) and doctor(s) at your next visit.                  "

## 2017-04-16 NOTE — ED NOTES
Patient ambulated in hallway without assistance he denies headache, dizziness or difficulty walking and states he feels like he can go home. MD informed.

## 2017-04-16 NOTE — DISCHARGE INSTRUCTIONS
Your kidney function and hemoglobin were slightly low today  Recommend that you have them repeated by your primary care provider  Decrease the lisinopril to 10 mg daily as recommended by the doctor today  Decrease lasix as well to every other day   Start omeprazole which is an antacid medication, follow up with your doctor about other further recommendations  Orthostatic Low Blood Pressure (Hypotension)  A blood pressure reading is made up of 2 numbers There is a top number over a bottom number. The top number is the systolic pressure. The bottom number is the diastolic pressure. A normal blood pressure is less than 120 over less than 80. Low blood pressure (hypotension) is a blood pressure that is less than what is normal for you.  Orthostatic hypotension is a type of low blood pressure that occurs only when you change position from lying to standing. It can cause dizziness, lightheadedness, or fainting.  Medicines can cause orthostatic hypotension. These include:    High blood pressure medicines    Water pills (diuretics)    Some heart medicines    Some antidepressants    Pain, anxiety, sedative, and sleeping medicines  Other causes include:    Dehydration from vomiting, diarrhea, or not getting enough fluids    Severe infection    High fever    Blood loss. This could be bleeding from the stomach or intestines.  Treatment will depend on what is causing your low blood pressure.  Home care  Follow these guidelines when caring for yourself at home:    Rest until your symptoms get better.    Change positions slowly from lying to standing. When getting out of bed, sit on the side of the bed with your legs down for at least 30 seconds before standing. This gives your body time to adjust to the position change.    Follow the treatment plan described by your health care provider.  Follow-up care  Follow up with your health care provider, or as advised.  When to seek medical advice  Call your health care provider right  away if any of these occur:    Dizziness, lightheadedness, or fainting    Black or red color in your stools or vomit    Diarrhea or vomiting that doesn t go away    You aren t able to eat or drink    Fever of 100.4 F (38 C) or higher, or as directed by your health care provider    Burning when you urinate    Foul-smelling urine    7205-4952 [employer]. [address] [place] [address], [place] [address]. All rights reserved. This information is not intended as a substitute for professional medical care. Always follow your healthcare professional's instructions.

## 2017-04-16 NOTE — ED PROVIDER NOTES
History     Chief Complaint:  Hypotension      HPI   Vaibhav Crabtree is a 66 year old male with a history of HTN, CHF, hyperlipidemia, and cardiomyopathy who presents to the emergency department for evaluation of hypotension. The patient states that he has been intermittently lightheaded for the past two days. Has also chronic upper abdominal pain. Two days ago vomited x1 that appeared dark. No bloody or black stools. Yesterday evening, the patient states that he was standing up from sitting, felt lightheaded, and had a near syncopal episode. He notes that he fell on his right knee as a result of this episode, but denies sustaining any other injuries or striking his head. The patient was seen in urgent care for these symptoms last night and was recommended to decrease lisinopril to 10 mg per day. Went home but BP still low to 70-80s when standing so decided to come in. He denies any recent fevers, shortness of breath, or black or bloody stools. Has been taking his lasix. On 1/24/2017, the patient underwent a esophagogastroduodenoscopy demonstrating gastritis and possible H.pylori. No documentation that he was ever started on any H2 blockers or medications for this. No chest pain, shortness of breath. No palpitations, headache. No recent illness or fever.    Allergies:  NDKA    Medications:    lisinopril  Coreg  Lasix  Aspirin    Past Medical History:    CAD  Claudication of right lower extremity  HTN  hyperlipidemia  Idiopathic cardiomyopathy  osteoarthritis  Mitral regurgitation  CHF  Acute kidney injury    Past Surgical History:    Ulnar nerve repair  Rotator cuff repair, bilateral   esophagogastroduodenoscopy  Tonsillectomy    Family History:    COPD  Depression    Social History:  Presents alone.  Former Smoker, 4.00 ppd for 15 years.  Positive for alcohol use, occasionally.   Marital Status:   [5]    Review of Systems   Constitutional: Negative for fever.   Respiratory: Negative for shortness of  "breath.    Gastrointestinal: Positive for abdominal pain and vomiting. Negative for blood in stool.   Neurological: Positive for syncope (Near) and light-headedness.   All other systems reviewed and are negative.    Physical Exam     Patient Vitals for the past 24 hrs:   BP Temp Temp src Heart Rate Resp SpO2 Height Weight   04/16/17 0445 108/73 - - 86 18 97 % - -   04/16/17 0430 114/72 - - 85 9 98 % - -   04/16/17 0415 117/76 - - 86 11 98 % - -   04/16/17 0400 118/78 - - 87 12 97 % - -   04/16/17 0345 112/76 - - 84 (!) 0 97 % - -   04/16/17 0330 116/74 - - 82 13 96 % - -   04/16/17 0315 117/78 - - 87 11 97 % - -   04/16/17 0300 110/73 - - 82 9 96 % - -   04/16/17 0245 108/75 - - 85 24 97 % - -   04/16/17 0229 127/82 - - - - 96 % - -   04/16/17 0204 129/80 98.1  F (36.7  C) Oral 89 - 99 % 1.702 m (5' 7\") 73.4 kg (161 lb 12.8 oz)         Physical Exam  General: Resting comfortably on the gurney  Eyes:  The pupils are equal and round    Conjunctivae and sclerae are normal  ENT:    Moist mucous membranes  Neck:  Normal range of motion  CV:  Regular rate and rhythm    Skin warm and well perfused   Resp:  Lungs are clear    Non-labored    No rales    No wheezing   GI:  Abdomen is soft, there is no rigidity    No distension    No rebound tenderness     No focal abdominal tenderness    No guarding  MS:  Normal muscular tone    No leg swelling  Skin:  No rash or acute skin lesions noted  Neuro:   Awake, alert.      Speech is normal and fluent.    Face is symmetric.     Moves all extremities  Psych:  Normal affect.  Appropriate interactions.    Emergency Department Course   ECG:  Indication: Hypotension  Time: 0237  Vent. Rate 84 bpm. MT interval 152. QRS duration 102. QT/QTc 410/484. P-R-T axis 45 -22 60.  Normal sinus rhythm. Cannot rule out inferior infarct, age undetermined. Cannot rule out anterior infarct, age undetermined. Abnormal ECG. No significant change compared to EKG dated 1/2/2017. Read time: " 0245    Laboratory:  CBC: WBC: 6.9, HGB: 9.3 (L), PLT: 392  BMP: Potassium 3.2 (L), BUN 43 (H), Creatinine 2.06 (H), GFR Estimate 32 (L), Calcium 8.3 (L), o/w WNL     Lipase: 245    0322 Troponin: <0.015    ABO/Rh type and screen: A positive  Hepatic Panel: Protein total 6.4 (L), o/w WNL    Interventions:  0427  mL IV    Emergency Department Course:  Nursing notes and vitals reviewed. I performed an exam of the patient as documented above.     EKG obtained in the ED, see results above.     IV inserted. Medicine administered as documented above. Blood drawn. This was sent to the lab for further testing, results above.    0452 I reevaluated the patient and provided an update in regards to his ED course.      The patient was ambulated here in the emergency department without difficulty.     Findings and plan explained to the Patient. Patient discharged home with instructions regarding supportive care, medications, and reasons to return. The importance of close follow-up was reviewed. The patient was prescribed Prilosec.     I personally reviewed the laboratory results with the Patient and answered all related questions prior to discharge.     Impression & Plan    Medical Decision Making:  Vaibhav Crabtree is a 66 year old male who presents to the emergency department with hypotension. He was actually not hypotensive here in the emergency department and looks well. No tachycardia or hypoxia. He did have orthostatic BP changes when going from laying to sitting/standing and felt lightheaded. He did not have a syncopal episode at home, but was feeling lightheaded enough that he fell to the ground. Laboratory values obtained and his creatinine is elevated to 2.06, though it has been elevated in the past several months as high as 2.35. His hemoglobin is slightly low at 9.3, but it has been that low back in January 2017. He denies any dark stools, though he did have episode of dark colored emesis a few days ago though  unclear if was hematemesis or food. I reviewed his esophagogastroduodenoscopy results of Jan 2017 which did show evidence of gastritis and possibly H. Pylori, though he was not started on any medication for these and it is unclear why as he reports never receiving results from this study or letter stating what the next steps are. I will start him on antacid medication given he does have chronic epigastric tenderness and the EGD results and he can follow up with his primary care provider about additional medication. Even though his labs were slightly worse than they were on the last check, I do think it is reasonable for the patient to follow up with primary care provider. He was given a small dose of IV fluids and after this, he had resolution of the lightheadedness. May be component of dehydration as both BUN and Cr are slightly elevated. Ambulated in ED with no lightheadedness. No hypotension in ED. I recommended that he follow up with his primary care provider for recheck of the labs in next few days. He will decrease his lisinopril as recommended earlier tonight, as well as Lasix for next few days till follow-up with PCP. His primary care provider can then discuss adding these back. He feels comfortable with discharge home and I do think this is reasonable as he is feeling much better after IV fluids. Unlikely ACS given neg troponin and no sob or chest pain and EKG appears similar to prior EKGs. Unlikely PE given no chest pain or shortness of breath. No abdominal tenderness to suggest SAH. EKG shows sinus rhythm with no acute changes from prior EKG. Reasons to return to the emergency department were discussed with the patient.     Diagnosis:    ICD-10-CM   1. Orthostatic hypotension I95.1   2. RAYSHAWN (acute kidney injury) (H) N17.9   3. Anemia, unspecified type D64.9       Disposition:  discharged to home    Discharge Medications:  New Prescriptions    OMEPRAZOLE (PRILOSEC) 20 MG CR CAPSULE    Take 1 capsule (20  mg) by mouth daily     I, Elodia Parker, am serving as a scribe on 4/16/2017 at 2:51 AM to personally document services performed by Leann Rose MD based on my observations and the provider's statements to me.     Elodia Parker  4/16/2017    EMERGENCY DEPARTMENT       Leann Rose MD  04/16/17 0950

## 2017-04-17 ENCOUNTER — TELEPHONE (OUTPATIENT)
Dept: FAMILY MEDICINE | Facility: CLINIC | Age: 67
End: 2017-04-17

## 2017-04-17 NOTE — TELEPHONE ENCOUNTER
Left message for patient to call clinic  Need OV with Dr. Lee for eval after ED for lab work    Skye Wilkerson RN

## 2017-04-17 NOTE — TELEPHONE ENCOUNTER
[facility]-Income Protection Claim form faxed to [facility]  [phone]  April 17, 2017  Columba Smith TC

## 2017-04-17 NOTE — TELEPHONE ENCOUNTER
Pt seen at [facility]  Saturday.  Pt states needs labs ..     Please call pt at cell phone  Need lab orders or does pt need to be seen

## 2017-04-18 ENCOUNTER — OFFICE VISIT (OUTPATIENT)
Dept: FAMILY MEDICINE | Facility: CLINIC | Age: 67
End: 2017-04-18
Payer: COMMERCIAL

## 2017-04-18 ENCOUNTER — TRANSFERRED RECORDS (OUTPATIENT)
Dept: HEALTH INFORMATION MANAGEMENT | Facility: CLINIC | Age: 67
End: 2017-04-18

## 2017-04-18 VITALS
SYSTOLIC BLOOD PRESSURE: 96 MMHG | BODY MASS INDEX: 25.37 KG/M2 | DIASTOLIC BLOOD PRESSURE: 62 MMHG | WEIGHT: 162 LBS | TEMPERATURE: 97.9 F | HEART RATE: 71 BPM | OXYGEN SATURATION: 100 %

## 2017-04-18 DIAGNOSIS — I50.9 CONGESTIVE HEART FAILURE, UNSPECIFIED CONGESTIVE HEART FAILURE CHRONICITY, UNSPECIFIED CONGESTIVE HEART FAILURE TYPE: ICD-10-CM

## 2017-04-18 DIAGNOSIS — N17.9 ACUTE KIDNEY INJURY (H): Primary | ICD-10-CM

## 2017-04-18 DIAGNOSIS — I95.1 ORTHOSTATIC HYPOTENSION: ICD-10-CM

## 2017-04-18 DIAGNOSIS — N17.9 AKI (ACUTE KIDNEY INJURY) (H): Primary | ICD-10-CM

## 2017-04-18 DIAGNOSIS — I42.9 IDIOPATHIC CARDIOMYOPATHY (H): ICD-10-CM

## 2017-04-18 PROCEDURE — 80048 BASIC METABOLIC PNL TOTAL CA: CPT | Performed by: FAMILY MEDICINE

## 2017-04-18 PROCEDURE — 36415 COLL VENOUS BLD VENIPUNCTURE: CPT | Performed by: FAMILY MEDICINE

## 2017-04-18 PROCEDURE — 99214 OFFICE O/P EST MOD 30 MIN: CPT | Performed by: FAMILY MEDICINE

## 2017-04-18 NOTE — TELEPHONE ENCOUNTER
Called home number- phone picked up and disconnected unable to leave a message.    Analy Jordan RN  [employer]  [phone]

## 2017-04-18 NOTE — MR AVS SNAPSHOT
"              After Visit Summary   4/18/2017    Vaibhav Crabtree    MRN: [id number]           Patient Information     Date Of Birth          1950        Visit Information        Provider Department      4/18/2017 12:40 PM Xander Lee MD [facility]        Today's Diagnoses     RAYSHAWN (acute kidney injury) (H)    -  1    Orthostatic hypotension        Idiopathic cardiomyopathy (H)        Congestive heart failure, unspecified congestive heart failure chronicity, unspecified congestive heart failure type (H)           Follow-ups after your visit        Who to contact     If you have questions or need follow up information about today's clinic visit or your schedule please contact [facility] directly at [phone].  Normal or non-critical lab and imaging results will be communicated to you by MyChart, letter or phone within 4 business days after the clinic has received the results. If you do not hear from us within 7 days, please contact the clinic through [email or web address] or phone. If you have a critical or abnormal lab result, we will notify you by phone as soon as possible.  Submit refill requests through Pitchbrite or call your pharmacy and they will forward the refill request to us. Please allow 3 business days for your refill to be completed.          Additional Information About Your Visit        MyChart Information     Pitchbrite lets you send messages to your doctor, view your test results, renew your prescriptions, schedule appointments and more. To sign up, go to [email or web address] . Click on \"Log in\" on the left side of the screen, which will take you to the Welcome page. Then click on \"Sign up Now\" on the right side of the page.     You will be asked to enter the access code listed below, as well as some personal information. Please follow the directions to create your username and password.     Your access code is: [email or web address]  Expires: 7/12/2017  9:53 AM     Your access " code will  in 90 days. If you need help or a new code, please call your [place] clinic or [phone].        Care EveryWhere ID     This is your Care EveryWhere ID. This could be used by other organizations to access your [place] medical records  [id number]        Your Vitals Were     Pulse Temperature Pulse Oximetry BMI (Body Mass Index)          71 97.9  F (36.6  C) (Tympanic) 100% 25.37 kg/m2         Blood Pressure from Last 3 Encounters:   17 96/62   17 108/76   04/15/17 (!) 82/50    Weight from Last 3 Encounters:   17 162 lb (73.5 kg)   17 161 lb 12.8 oz (73.4 kg)   04/15/17 162 lb 14.4 oz (73.9 kg)              Today, you had the following     No orders found for display       Primary Care Provider Office Phone # Fax #    Xander Lee [phone]       [address] [address]        Thank you!     Thank you for [place] [facility]  [place]. Our goal is always to provide you with excellent care. Hearing back from our patients is one way we can continue to improve our services. Please take a few minutes to complete the written survey that you may receive in the mail after your visit with us. Thank you!             Your Updated Medication List - Protect others around you: Learn how to safely use, store and throw away your medicines at www.disposemymeds.org.          This list is accurate as of: 17 12:54 PM.  Always use your most recent med list.                   Brand Name Dispense Instructions for use    aspirin 325 MG tablet      Take 325 mg by mouth daily       carvedilol 25 MG tablet    COREG    180 tablet    Take 1 tablet (25 mg) by mouth 2 times daily (with meals)       furosemide 20 MG tablet    LASIX    30 tablet    Take 1 tablet (20 mg) by mouth daily as needed       lisinopril 10 MG tablet    PRINIVIL/ZESTRIL    30 tablet    Take 1 tablet (10 mg) by mouth daily        omeprazole 20 MG CR capsule    priLOSEC    30 capsule    Take 1 capsule (20 mg) by mouth daily

## 2017-04-18 NOTE — NURSING NOTE
"Chief Complaint   Patient presents with     ER F/U       Initial BP 96/62 (BP Location: Right arm, Patient Position: Chair, Cuff Size: Adult Regular)  Pulse 71  Temp 97.9  F (36.6  C) (Tympanic)  Wt 162 lb (73.5 kg)  SpO2 100%  BMI 25.37 kg/m2 Estimated body mass index is 25.37 kg/(m^2) as calculated from the following:    Height as of 4/16/17: 5' 7\" (1.702 m).    Weight as of this encounter: 162 lb (73.5 kg).  Medication Reconciliation: complete     Corrina Villa CMA      "

## 2017-04-18 NOTE — PROGRESS NOTES
SUBJECTIVE:                                                    Vaibhav Crabtree is a 66 year old male who presents to clinic today for the following health issues:      ED/UC Followup:    Facility:  [facility]  Date of visit: 4/16/17  Reason for visit: Low blood pressure  Current Status: same        Problem list and histories reviewed & adjusted, as indicated.  Additional history: as documented    Patient Active Problem List   Diagnosis     Dyspnea and respiratory abnormality     Impotence of organic origin     Adjustment reaction     CARDIOVASCULAR SCREENING; LDL GOAL LESS THAN 130     Pain, joint, knee, right     Advanced directives, counseling/discussion     Arthritis of knee, right     Unstable angina (H)     Idiopathic cardiomyopathy (H)     Congestive heart failure (H)     Hypertension     Hyperlipidemia     CAD (coronary artery disease)     Mitral regurgitation     Atypical chest pain     Claudication of right lower extremity (H)     RAYSHAWN (acute kidney injury) (H)     Orthostatic hypotension     Past Surgical History:   Procedure Laterality Date     C NONSPECIFIC PROCEDURE  2002    ulnar nerve     C NONSPECIFIC PROCEDURE      right rotator cuff repair     ESOPHAGOSCOPY, GASTROSCOPY, DUODENOSCOPY (EGD), COMBINED N/A 1/24/2017    Procedure: COMBINED ESOPHAGOSCOPY, GASTROSCOPY, DUODENOSCOPY (EGD), BIOPSY SINGLE OR MULTIPLE;  Surgeon: Reji Mcghee MD;  Location:  GI     HC REMOVAL OF TONSILS,<11 Y/O      Tonsils <10y.o.     HC REPAIR ROTATOR CUFF,ACUTE  02 and 03    left rotator cuff repair       Social History   Substance Use Topics     Smoking status: Former Smoker     Packs/day: 4.00     Years: 15.00     Types: Cigarettes     Quit date: 6/20/1971     Smokeless tobacco: Never Used     Alcohol use 0.0 oz/week     0 Standard drinks or equivalent per week      Comment: caitlin maloney     Family History   Problem Relation Age of Onset     Respiratory Father      COPD     Unknown/Adopted Mother      CANCER Brother       CANCER Brother      Connective Tissue Disorder Paternal Grandfather      Depression Daughter      Eye Disorder Son          Current Outpatient Prescriptions   Medication Sig Dispense Refill     omeprazole (PRILOSEC) 20 MG CR capsule Take 1 capsule (20 mg) by mouth daily 30 capsule 0     lisinopril (PRINIVIL/ZESTRIL) 10 MG tablet Take 1 tablet (10 mg) by mouth daily 30 tablet 1     carvedilol (COREG) 25 MG tablet Take 1 tablet (25 mg) by mouth 2 times daily (with meals) 180 tablet 3     furosemide (LASIX) 20 MG tablet Take 1 tablet (20 mg) by mouth daily as needed 30 tablet 12     aspirin 325 MG tablet Take 325 mg by mouth daily       Allergies   Allergen Reactions     No Known Allergies      Recent Labs   Lab Test  04/16/17   0322  03/06/17   1501   01/20/17   0957   01/02/17   0956   01/12/15   0525  01/11/15   1240   05/28/14   0605   05/27/14   0255   LDL   --    --    --    --    --    --    --   96   --    --   69   --    --    HDL   --    --    --    --    --    --    --   77   --    --   109   --    --    TRIG   --    --    --    --    --    --    --   130   --    --   130   --    --    ALT  31   --    --   26   --   28   < >  34  43   --    --    --    --    CR  2.06*  1.45*   < >  1.61*   < >  3.92*   < >  1.13  1.21   < >   --    < >  1.11   GFRESTIMATED  32*  48*   < >  43*   < >  15*   < >  65  60*   < >   --    < >  66   GFRESTBLACK  39*  58*   < >  52*   < >  19*   < >  78  72   < >   --    < >  80   POTASSIUM  3.2*  4.1   < >  3.9   < >  3.2*   < >  4.3  4.0   < >  3.7   < >   --    TSH   --    --    --    --    --    --    --    --   1.90   --    --    --   6.58*    < > = values in this interval not displayed.      BP Readings from Last 3 Encounters:   04/18/17 96/62   04/16/17 108/76   04/15/17 (!) 82/50    Wt Readings from Last 3 Encounters:   04/18/17 162 lb (73.5 kg)   04/16/17 161 lb 12.8 oz (73.4 kg)   04/15/17 162 lb 14.4 oz (73.9 kg)                    Reviewed and updated as needed this  visit by clinical staff       Reviewed and updated as needed this visit by Provider         ROS:  Constitutional, HEENT, cardiovascular, pulmonary, gi and gu systems are negative, except as otherwise noted.    OBJECTIVE:                                                    BP 96/62 (BP Location: Right arm, Patient Position: Chair, Cuff Size: Adult Regular)  Pulse 71  Temp 97.9  F (36.6  C) (Tympanic)  Wt 162 lb (73.5 kg)  SpO2 100%  BMI 25.37 kg/m2  Body mass index is 25.37 kg/(m^2).  GENERAL: healthy, alert and no distress  NECK: no adenopathy, no asymmetry, masses, or scars and thyroid normal to palpation  RESP: lungs clear to auscultation - no rales, rhonchi or wheezes  CV: regular rate and rhythm, normal S1 S2, no S3 or S4, no murmur, click or rub, no peripheral edema and peripheral pulses strong  ABDOMEN: soft, nontender, no hepatosplenomegaly, no masses and bowel sounds normal  MS: no gross musculoskeletal defects noted, no edema         ASSESSMENT/PLAN:                                                    Vaibhav was seen today for er f/u.    Diagnoses and all orders for this visit:    RAYSHAWN (acute kidney injury) (H)  -     Basic metabolic panel; Future    Orthostatic hypotension    Idiopathic cardiomyopathy (H)    Congestive heart failure, unspecified congestive heart failure chronicity, unspecified congestive heart failure type (H)      Pt resumed taking lisinopril and lasix, BP is still running low and has light head, will have him to keep holding off of the meds, and encouraged him to try drinking water to maintain body fluid and circulation  Will have him to check BMP and RTC in 1 week for f/u  Encouraged him to schedule with cardiology for cardiac clarification on upcoming knee replacement surgery      Xander Lee MD  [facility]

## 2017-04-19 LAB
ANION GAP SERPL CALCULATED.3IONS-SCNC: 6 MMOL/L (ref 3–14)
BUN SERPL-MCNC: 23 MG/DL (ref 7–30)
CALCIUM SERPL-MCNC: 8.6 MG/DL (ref 8.5–10.1)
CHLORIDE SERPL-SCNC: 108 MMOL/L (ref 94–109)
CO2 SERPL-SCNC: 29 MMOL/L (ref 20–32)
CREAT SERPL-MCNC: 1.73 MG/DL (ref 0.66–1.25)
GFR SERPL CREATININE-BSD FRML MDRD: 39 ML/MIN/1.7M2
GLUCOSE SERPL-MCNC: 110 MG/DL (ref 70–99)
POTASSIUM SERPL-SCNC: 4.3 MMOL/L (ref 3.4–5.3)
SODIUM SERPL-SCNC: 143 MMOL/L (ref 133–144)

## 2017-04-19 NOTE — TELEPHONE ENCOUNTER
ED / Discharge Outreach Protocol    Patient Contact    Attempt # 3    Was call answered?  No.  Left message on voicemail with information to call me back.  Follow up with PCP scheduled for 4/25/2017  Daria Gamboa RN  [employer] [employer]

## 2017-04-19 NOTE — TELEPHONE ENCOUNTER
Patient returned call and sates was in to see PCP yesterday and had labs already.  Daria Gamboa RN  [employer] [employer]

## 2017-04-21 ENCOUNTER — TELEPHONE (OUTPATIENT)
Dept: FAMILY MEDICINE | Facility: CLINIC | Age: 67
End: 2017-04-21

## 2017-04-25 ENCOUNTER — OFFICE VISIT (OUTPATIENT)
Dept: FAMILY MEDICINE | Facility: CLINIC | Age: 67
End: 2017-04-25
Payer: COMMERCIAL

## 2017-04-25 VITALS
BODY MASS INDEX: 26.37 KG/M2 | DIASTOLIC BLOOD PRESSURE: 60 MMHG | OXYGEN SATURATION: 98 % | HEART RATE: 71 BPM | SYSTOLIC BLOOD PRESSURE: 100 MMHG | WEIGHT: 168 LBS | HEIGHT: 67 IN | TEMPERATURE: 97.4 F

## 2017-04-25 DIAGNOSIS — I95.9 HYPOTENSION, UNSPECIFIED HYPOTENSION TYPE: ICD-10-CM

## 2017-04-25 DIAGNOSIS — N17.9 ACUTE KIDNEY INJURY (H): ICD-10-CM

## 2017-04-25 DIAGNOSIS — I50.32 CHRONIC DIASTOLIC CONGESTIVE HEART FAILURE (H): ICD-10-CM

## 2017-04-25 DIAGNOSIS — I42.9 IDIOPATHIC CARDIOMYOPATHY (H): ICD-10-CM

## 2017-04-25 LAB
ANION GAP SERPL CALCULATED.3IONS-SCNC: 8 MMOL/L (ref 3–14)
BUN SERPL-MCNC: 16 MG/DL (ref 7–30)
CALCIUM SERPL-MCNC: 8.5 MG/DL (ref 8.5–10.1)
CHLORIDE SERPL-SCNC: 112 MMOL/L (ref 94–109)
CO2 SERPL-SCNC: 23 MMOL/L (ref 20–32)
CREAT SERPL-MCNC: 1.39 MG/DL (ref 0.66–1.25)
GFR SERPL CREATININE-BSD FRML MDRD: 51 ML/MIN/1.7M2
GLUCOSE SERPL-MCNC: 97 MG/DL (ref 70–99)
POTASSIUM SERPL-SCNC: 4.8 MMOL/L (ref 3.4–5.3)
SODIUM SERPL-SCNC: 143 MMOL/L (ref 133–144)

## 2017-04-25 PROCEDURE — 80048 BASIC METABOLIC PNL TOTAL CA: CPT | Performed by: FAMILY MEDICINE

## 2017-04-25 PROCEDURE — 36415 COLL VENOUS BLD VENIPUNCTURE: CPT | Performed by: FAMILY MEDICINE

## 2017-04-25 PROCEDURE — 99214 OFFICE O/P EST MOD 30 MIN: CPT | Performed by: FAMILY MEDICINE

## 2017-04-25 PROCEDURE — 82728 ASSAY OF FERRITIN: CPT

## 2017-04-25 RX ORDER — FUROSEMIDE 20 MG
20 TABLET ORAL DAILY PRN
Qty: 30 TABLET | Refills: 12
Start: 2017-04-25 | End: 2017-05-15

## 2017-04-25 RX ORDER — LISINOPRIL 10 MG/1
5 TABLET ORAL DAILY
Qty: 30 TABLET | Refills: 1
Start: 2017-04-25 | End: 2017-05-22

## 2017-04-25 NOTE — MR AVS SNAPSHOT
After Visit Summary   4/25/2017    Vaibhav Crabtree    MRN: [id number]           Patient Information     Date Of Birth          1950        Visit Information        Provider Department      4/25/2017 9:00 AM Xander Lee MD [facility]        Today's Diagnoses     Acute kidney injury (H)        Idiopathic cardiomyopathy (H)        Chronic diastolic congestive heart failure (H)        Hypotension, unspecified hypotension type           Follow-ups after your visit        Your next 10 appointments already scheduled     May 15, 2017 12:45 PM CDT   New Visit with Jose R Rudd MD   [facility] [place] ([facility])    [address]  [facility] [address]   [phone]            May 18, 2017  9:00 AM CDT   Pre-Op physical with Xander Lee MD   [facility] [place]    [address] [id number] [phone]            May 24, 2017   Procedure with Marco A Iyer MD   [facility] (--)    [address] [place] [address], [address]  [facility] [address] [phone]              Who to contact     If you have questions or need follow up information about today's clinic visit or your schedule please contact [facility] directly at [phone].  Normal or non-critical lab and imaging results will be communicated to you by MyChart, letter or phone within 4 business days after the clinic has received the results. If you do not hear from us within 7 days, please contact the clinic through MyChart or phone. If you have a critical or abnormal lab result, we will notify you by phone as soon as possible.  Submit refill requests through Sprout Social or call your pharmacy and they will forward the refill request to us. Please allow 3 business days for your refill to be completed.          Additional Information About Your Visit       "  MyChart Information     iZettle lets you send messages to your doctor, view your test results, renew your prescriptions, schedule appointments and more. To sign up, go to [email or web address] . Click on \"Log in\" on the left side of the screen, which will take you to the Welcome page. Then click on \"Sign up Now\" on the right side of the page.     You will be asked to enter the access code listed below, as well as some personal information. Please follow the directions to create your username and password.     Your access code is: [email or web address]  Expires: 2017  9:53 AM     Your access code will  in 90 days. If you need help or a new code, please call your [place] clinic or [phone].        Care EveryWhere ID     This is your Care EveryWhere ID. This could be used by other organizations to access your [place] medical records  [id number]        Your Vitals Were     Pulse Temperature Height Pulse Oximetry BMI (Body Mass Index)       71 97.4  F (36.3  C) (Tympanic) 5' 7\" (1.702 m) 98% 26.31 kg/m2        Blood Pressure from Last 3 Encounters:   17 100/60   17 96/62   17 108/76    Weight from Last 3 Encounters:   17 168 lb (76.2 kg)   17 162 lb (73.5 kg)   17 161 lb 12.8 oz (73.4 kg)              We Performed the Following     **Basic metabolic panel FUTURE anytime          Today's Medication Changes          These changes are accurate as of: 17  9:10 AM.  If you have any questions, ask your nurse or doctor.               These medicines have changed or have updated prescriptions.        Dose/Directions    lisinopril 10 MG tablet   Commonly known as:  PRINIVIL/ZESTRIL   This may have changed:  how much to take   Used for:  Hypotension, unspecified hypotension type   Changed by:  Xander Lee MD        Dose:  5 mg   Take 0.5 tablets (5 mg) by mouth daily   Quantity:  30 tablet   Refills:  1            Where to get your medicines      Some of these will need a " paper prescription and others can be bought over the counter.  Ask your nurse if you have questions.     You don't need a prescription for these medications     furosemide 20 MG tablet    lisinopril 10 MG tablet                Primary Care Provider Office Phone # Fax #    Xander Lee [phone]       [address] [address]        Thank you!     Thank you for choosing [facility]  [place]. Our goal is always to provide you with excellent care. Hearing back from our patients is one way we can continue to improve our services. Please take a few minutes to complete the written survey that you may receive in the mail after your visit with us. Thank you!             Your Updated Medication List - Protect others around you: Learn how to safely use, store and throw away your medicines at www.disposemymeds.org.          This list is accurate as of: 4/25/17  9:10 AM.  Always use your most recent med list.                   Brand Name Dispense Instructions for use    aspirin 325 MG tablet      Take 325 mg by mouth daily       carvedilol 25 MG tablet    COREG    180 tablet    Take 1 tablet (25 mg) by mouth 2 times daily (with meals)       furosemide 20 MG tablet    LASIX    30 tablet    Take 1 tablet (20 mg) by mouth daily as needed       lisinopril 10 MG tablet    PRINIVIL/ZESTRIL    30 tablet    Take 0.5 tablets (5 mg) by mouth daily       omeprazole 20 MG CR capsule    priLOSEC    30 capsule    Take 1 capsule (20 mg) by mouth daily

## 2017-04-25 NOTE — TELEPHONE ENCOUNTER
"Form completed and faxed back to [employer] and a fax confirmation was received. Form sent to \"stat\" abstraction.  Alla Hawkins,     "

## 2017-04-25 NOTE — PROGRESS NOTES
SUBJECTIVE:                                                    Vaibhav Crabtree is a 66 year old male who presents to clinic today for the following health issues:      Concern - recheck and test results     Onset:     Description:       Intensity:     Progression of Symptoms:      Accompanying Signs & Symptoms:         Previous history of similar problem:       Precipitating factors:   Worsened by:     Alleviating factors:  Improved by:        Therapies Tried and outcome:       Problem list and histories reviewed & adjusted, as indicated.  Additional history: as documented    Patient Active Problem List   Diagnosis     Dyspnea and respiratory abnormality     Impotence of organic origin     Adjustment reaction     CARDIOVASCULAR SCREENING; LDL GOAL LESS THAN 130     Pain, joint, knee, right     Advanced directives, counseling/discussion     Arthritis of knee, right     Unstable angina (H)     Idiopathic cardiomyopathy (H)     Congestive heart failure (H)     Hypertension     Hyperlipidemia     CAD (coronary artery disease)     Mitral regurgitation     Atypical chest pain     Claudication of right lower extremity (H)     RAYSHAWN (acute kidney injury) (H)     Orthostatic hypotension     Past Surgical History:   Procedure Laterality Date     C NONSPECIFIC PROCEDURE  2002    ulnar nerve     C NONSPECIFIC PROCEDURE      right rotator cuff repair     ESOPHAGOSCOPY, GASTROSCOPY, DUODENOSCOPY (EGD), COMBINED N/A 1/24/2017    Procedure: COMBINED ESOPHAGOSCOPY, GASTROSCOPY, DUODENOSCOPY (EGD), BIOPSY SINGLE OR MULTIPLE;  Surgeon: Reji Mcghee MD;  Location:  GI     HC REMOVAL OF TONSILS,<11 Y/O      Tonsils <10y.o.     HC REPAIR ROTATOR CUFF,ACUTE  02 and 03    left rotator cuff repair       Social History   Substance Use Topics     Smoking status: Former Smoker     Packs/day: 4.00     Years: 15.00     Types: Cigarettes     Quit date: 6/20/1971     Smokeless tobacco: Never Used     Alcohol use 0.0 oz/week     0 Standard  drinks or equivalent per week      Comment: nakiaPaul maloney     Family History   Problem Relation Age of Onset     Respiratory Father      COPD     Unknown/Adopted Mother      CANCER Brother      CANCER Brother      Connective Tissue Disorder Paternal Grandfather      Depression Daughter      Eye Disorder Son          Current Outpatient Prescriptions   Medication Sig Dispense Refill     furosemide (LASIX) 20 MG tablet Take 1 tablet (20 mg) by mouth daily as needed 30 tablet 12     lisinopril (PRINIVIL/ZESTRIL) 10 MG tablet Take 0.5 tablets (5 mg) by mouth daily 30 tablet 1     omeprazole (PRILOSEC) 20 MG CR capsule Take 1 capsule (20 mg) by mouth daily 30 capsule 0     carvedilol (COREG) 25 MG tablet Take 1 tablet (25 mg) by mouth 2 times daily (with meals) 180 tablet 3     aspirin 325 MG tablet Take 325 mg by mouth daily       [DISCONTINUED] lisinopril (PRINIVIL/ZESTRIL) 10 MG tablet Take 1 tablet (10 mg) by mouth daily 30 tablet 1     [DISCONTINUED] furosemide (LASIX) 20 MG tablet Take 1 tablet (20 mg) by mouth daily as needed 30 tablet 12     Allergies   Allergen Reactions     No Known Allergies      Recent Labs   Lab Test  04/18/17   1256  04/16/17   0322   01/20/17   0957   01/02/17   0956   01/12/15   0525  01/11/15   1240   05/28/14   0605   05/27/14   0255   LDL   --    --    --    --    --    --    --   96   --    --   69   --    --    HDL   --    --    --    --    --    --    --   77   --    --   109   --    --    TRIG   --    --    --    --    --    --    --   130   --    --   130   --    --    ALT   --   31   --   26   --   28   < >  34  43   --    --    --    --    CR  1.73*  2.06*   < >  1.61*   < >  3.92*   < >  1.13  1.21   < >   --    < >  1.11   GFRESTIMATED  39*  32*   < >  43*   < >  15*   < >  65  60*   < >   --    < >  66   GFRESTBLACK  48*  39*   < >  52*   < >  19*   < >  78  72   < >   --    < >  80   POTASSIUM  4.3  3.2*   < >  3.9   < >  3.2*   < >  4.3  4.0   < >  3.7   < >   --    TSH   --     "--    --    --    --    --    --    --   1.90   --    --    --   6.58*    < > = values in this interval not displayed.      BP Readings from Last 3 Encounters:   04/25/17 100/60   04/18/17 96/62   04/16/17 108/76    Wt Readings from Last 3 Encounters:   04/25/17 168 lb (76.2 kg)   04/18/17 162 lb (73.5 kg)   04/16/17 161 lb 12.8 oz (73.4 kg)                    Reviewed and updated as needed this visit by clinical staff       Reviewed and updated as needed this visit by Provider         ROS:  Constitutional, HEENT, cardiovascular, pulmonary, gi and gu systems are negative, except as otherwise noted.    OBJECTIVE:                                                    /60 (BP Location: Right arm, Patient Position: Chair, Cuff Size: Adult Regular)  Pulse 71  Temp 97.4  F (36.3  C) (Tympanic)  Ht 5' 7\" (1.702 m)  Wt 168 lb (76.2 kg)  SpO2 98%  BMI 26.31 kg/m2  Body mass index is 26.31 kg/(m^2).  GENERAL: healthy, alert and no distress  NECK: no adenopathy, no asymmetry, masses, or scars and thyroid normal to palpation  RESP: lungs clear to auscultation - no rales, rhonchi or wheezes  CV: regular rate and rhythm, normal S1 S2, no S3 or S4, no murmur, click or rub, no peripheral edema and peripheral pulses strong  ABDOMEN: soft, nontender, no hepatosplenomegaly, no masses and bowel sounds normal  MS: no gross musculoskeletal defects noted, no edema         ASSESSMENT/PLAN:                                                    Vaibhav was seen today for recheck.    Diagnoses and all orders for this visit:    Acute kidney injury (H)  -     **Basic metabolic panel FUTURE anytime    Idiopathic cardiomyopathy (H)  -     furosemide (LASIX) 20 MG tablet; Take 1 tablet (20 mg) by mouth daily as needed    Chronic diastolic congestive heart failure (H)  -     furosemide (LASIX) 20 MG tablet; Take 1 tablet (20 mg) by mouth daily as needed    Hypotension, unspecified hypotension type  -     lisinopril (PRINIVIL/ZESTRIL) 10 MG " tablet; Take 0.5 tablets (5 mg) by mouth daily      His BP has been gradually improving, has salt restriction less than 2gm, encouraged hydrating to maintain hemodynamic circulation  Will keep him holding off of lasix, but will resume at 1/2 dose of lisinopril(5mg)  He is scheduled to see cardiology before upcoming surgery, will keep him taking the meds with today's new recommendation until being seen by cardiology. And will have him to reassess with cardiology        Xander Lee MD  [facility]

## 2017-04-25 NOTE — NURSING NOTE
"Chief Complaint   Patient presents with     RECHECK       Initial /60 (BP Location: Right arm, Patient Position: Chair, Cuff Size: Adult Regular)  Pulse 71  Temp 97.4  F (36.3  C) (Tympanic)  Ht 5' 7\" (1.702 m)  Wt 168 lb (76.2 kg)  SpO2 98%  BMI 26.31 kg/m2 Estimated body mass index is 26.31 kg/(m^2) as calculated from the following:    Height as of this encounter: 5' 7\" (1.702 m).    Weight as of this encounter: 168 lb (76.2 kg).  Medication Reconciliation: complete     Corrina Villa CMA      "

## 2017-04-28 DIAGNOSIS — D64.9 ANEMIA, UNSPECIFIED: Primary | ICD-10-CM

## 2017-04-28 LAB — FERRITIN SERPL-MCNC: 5 NG/ML (ref 26–388)

## 2017-05-01 ENCOUNTER — TRANSFERRED RECORDS (OUTPATIENT)
Dept: HEALTH INFORMATION MANAGEMENT | Facility: CLINIC | Age: 67
End: 2017-05-01

## 2017-05-04 DIAGNOSIS — D64.9 ANEMIA, UNSPECIFIED: Primary | ICD-10-CM

## 2017-05-04 PROBLEM — Z29.9 NEED FOR PROPHYLACTIC MEASURE: Status: ACTIVE | Noted: 2017-05-04

## 2017-05-04 PROBLEM — T45.4X5A IRON ADVERSE REACTION: Status: ACTIVE | Noted: 2017-05-04

## 2017-05-04 PROBLEM — K90.9 INTESTINAL MALABSORPTION, UNSPECIFIED: Status: ACTIVE | Noted: 2017-05-04

## 2017-05-05 ENCOUNTER — INFUSION THERAPY VISIT (OUTPATIENT)
Dept: INFUSION THERAPY | Facility: CLINIC | Age: 67
End: 2017-05-05
Attending: PATHOLOGY
Payer: COMMERCIAL

## 2017-05-05 VITALS
DIASTOLIC BLOOD PRESSURE: 71 MMHG | TEMPERATURE: 97.8 F | OXYGEN SATURATION: 98 % | HEART RATE: 68 BPM | SYSTOLIC BLOOD PRESSURE: 128 MMHG | RESPIRATION RATE: 16 BRPM

## 2017-05-05 DIAGNOSIS — D64.9 ANEMIA, UNSPECIFIED: Primary | ICD-10-CM

## 2017-05-05 DIAGNOSIS — K90.9 INTESTINAL MALABSORPTION, UNSPECIFIED: ICD-10-CM

## 2017-05-05 DIAGNOSIS — T45.4X5A IRON ADVERSE REACTION, INITIAL ENCOUNTER: ICD-10-CM

## 2017-05-05 PROCEDURE — 25000128 H RX IP 250 OP 636: Performed by: PATHOLOGY

## 2017-05-05 PROCEDURE — 96361 HYDRATE IV INFUSION ADD-ON: CPT

## 2017-05-05 PROCEDURE — 96365 THER/PROPH/DIAG IV INF INIT: CPT

## 2017-05-05 RX ADMIN — SODIUM CHLORIDE 250 ML: 9 INJECTION, SOLUTION INTRAVENOUS at 14:53

## 2017-05-05 RX ADMIN — FERRIC CARBOXYMALTOSE INJECTION 750 MG: 50 INJECTION, SOLUTION INTRAVENOUS at 14:53

## 2017-05-05 ASSESSMENT — PAIN SCALES - GENERAL: PAINLEVEL: MODERATE PAIN (5)

## 2017-05-05 NOTE — MR AVS SNAPSHOT
After Visit Summary   5/5/2017    Vaibhav Crabtree    MRN: [id number]           Patient Information     Date Of Birth          1950        Visit Information        Provider Department      5/5/2017 2:30 PM  INFUSION CHAIR 8 [facility]        Today's Diagnoses     Anemia, unspecified    -  1    Intestinal malabsorption, unspecified        Iron adverse reaction, initial encounter           Follow-ups after your visit        Your next 10 appointments already scheduled     May 12, 2017 12:00 PM CDT   Level 1 with  INFUSION CHAIR 5   [facility] ([facility])    [facility]  [address]  [facility] [address]   [phone]            May 15, 2017 10:30 AM CDT   Level 1 with  INFUSION CHAIR 17   [facility] ([facility])    [facility]  [address]  [place] [address] [phone]            May 15, 2017 12:45 PM CDT   New Visit with Jose R Rudd MD   [facility] [place] ([facility])    [address]  [facility] [address]   [phone]            May 18, 2017  9:00 AM CDT   Pre-Op physical with Xander Lee MD   [facility] [place] ([address] [address] [phone]            May 24, 2017   Procedure with Marco A Iyer MD   [facility] (--)    [address]  [facility] [address] [phone]              Who to contact     If you have questions or need follow up information about today's clinic visit or your schedule please contact [facility] AND INF[facility]ION [place] directly at [phone].  Normal or non-critical lab and imaging results will be communicated to you by MyChart, letter or phone within 4  "business days after the clinic has received the results. If you do not hear from us within 7 days, please contact the clinic through Leaky or phone. If you have a critical or abnormal lab result, we will notify you by phone as soon as possible.  Submit refill requests through Leaky or call your pharmacy and they will forward the refill request to us. Please allow 3 business days for your refill to be completed.          Additional Information About Your Visit        Leaky Information     Leaky lets you send messages to your doctor, view your test results, renew your prescriptions, schedule appointments and more. To sign up, go to [email or web address] . Click on \"Log in\" on the left side of the screen, which will take you to the Welcome page. Then click on \"Sign up Now\" on the right side of the page.     You will be asked to enter the access code listed below, as well as some personal information. Please follow the directions to create your username and password.     Your access code is: [email or web address]  Expires: 2017  9:53 AM     Your access code will  in 90 days. If you need help or a new code, please call your [place] clinic or [phone].        Care EveryWhere ID     This is your Care EveryWhere ID. This could be used by other organizations to access your [place] medical records  [id number]        Your Vitals Were     Pulse Temperature Respirations Pulse Oximetry          68 97.8  F (36.6  C) (Oral) 16 98%         Blood Pressure from Last 3 Encounters:   17 128/71   17 100/60   17 96/62    Weight from Last 3 Encounters:   17 76.2 kg (168 lb)   17 73.5 kg (162 lb)   17 73.4 kg (161 lb 12.8 oz)              We Performed the Following     Road Map Alert     Treatment Conditions     Treatment Conditions        Primary Care Provider Office Phone # Fax #    Xander Lee [phone]       [address]" [place] [address]        Thank you!     Thank you for choosing [facility]  for your care. Our goal is always to provide you with excellent care. Hearing back from our patients is one way we can continue to improve our services. Please take a few minutes to complete the written survey that you may receive in the mail after your visit with us. Thank you!             Your Updated Medication List - Protect others around you: Learn how to safely use, store and throw away your medicines at www.disposemymeds.org.          This list is accurate as of: 5/5/17  3:46 PM.  Always use your most recent med list.                   Brand Name Dispense Instructions for use    aspirin 325 MG tablet      Take 325 mg by mouth daily       carvedilol 25 MG tablet    COREG    180 tablet    Take 1 tablet (25 mg) by mouth 2 times daily (with meals)       furosemide 20 MG tablet    LASIX    30 tablet    Take 1 tablet (20 mg) by mouth daily as needed       lisinopril 10 MG tablet    PRINIVIL/ZESTRIL    30 tablet    Take 0.5 tablets (5 mg) by mouth daily       omeprazole 20 MG CR capsule    priLOSEC    30 capsule    Take 1 capsule (20 mg) by mouth daily

## 2017-05-05 NOTE — PROGRESS NOTES
Infusion Nursing Note:  Vaibhav Crabtree presents today for Injectafer.    Patient seen by provider today: No   present during visit today: Not Applicable.    Note: No concerns to report. Drug info handouts from Dr. Monique signed and copies given to patient..    Intravenous Access:  Peripheral IV placed.    Treatment Conditions:  Results reviewed, labs MET treatment parameters, ok to proceed with treatment.      Post Infusion Assessment:  Patient tolerated infusion without incident.  Patient observed for 30 minutes post Injectafer per protocol.  Site patent and intact, free from redness, edema or discomfort.  No evidence of extravasations.  Access discontinued per protocol.    Discharge Plan:   Discharge instructions reviewed with: Patient.  Patient and/or family verbalized understanding of discharge instructions and all questions answered.  Copy of AVS reviewed with patient and/or family.  Patient will return one week for next appointment.  Patient discharged in stable condition accompanied by: self.  Departure Mode: Ambulatory.    Mimi George RN

## 2017-05-09 ENCOUNTER — TELEPHONE (OUTPATIENT)
Dept: CARDIOLOGY | Facility: CLINIC | Age: 67
End: 2017-05-09

## 2017-05-09 ENCOUNTER — PRE VISIT (OUTPATIENT)
Dept: CARDIOLOGY | Facility: CLINIC | Age: 67
End: 2017-05-09

## 2017-05-10 ENCOUNTER — TELEPHONE (OUTPATIENT)
Dept: FAMILY MEDICINE | Facility: CLINIC | Age: 67
End: 2017-05-10

## 2017-05-10 NOTE — TELEPHONE ENCOUNTER
[employer] forms completed and all records from 4/15/17 on were faxed  [phone]  Sent to stat abstracting  Columba Smith TC

## 2017-05-12 ENCOUNTER — INFUSION THERAPY VISIT (OUTPATIENT)
Dept: INFUSION THERAPY | Facility: CLINIC | Age: 67
End: 2017-05-12
Attending: PATHOLOGY
Payer: COMMERCIAL

## 2017-05-12 VITALS
TEMPERATURE: 97.9 F | SYSTOLIC BLOOD PRESSURE: 145 MMHG | DIASTOLIC BLOOD PRESSURE: 85 MMHG | HEART RATE: 65 BPM | RESPIRATION RATE: 18 BRPM

## 2017-05-12 DIAGNOSIS — D64.9 ANEMIA, UNSPECIFIED: Primary | ICD-10-CM

## 2017-05-12 DIAGNOSIS — K90.9 INTESTINAL MALABSORPTION, UNSPECIFIED: ICD-10-CM

## 2017-05-12 DIAGNOSIS — T45.4X5A IRON ADVERSE REACTION, INITIAL ENCOUNTER: ICD-10-CM

## 2017-05-12 PROCEDURE — 96365 THER/PROPH/DIAG IV INF INIT: CPT

## 2017-05-12 PROCEDURE — 25000128 H RX IP 250 OP 636: Performed by: PATHOLOGY

## 2017-05-12 PROCEDURE — 96361 HYDRATE IV INFUSION ADD-ON: CPT

## 2017-05-12 RX ADMIN — SODIUM CHLORIDE 250 ML: 9 INJECTION, SOLUTION INTRAVENOUS at 12:25

## 2017-05-12 RX ADMIN — FERRIC CARBOXYMALTOSE INJECTION 750 MG: 50 INJECTION, SOLUTION INTRAVENOUS at 12:25

## 2017-05-12 ASSESSMENT — PAIN SCALES - GENERAL: PAINLEVEL: MODERATE PAIN (5)

## 2017-05-12 NOTE — PROGRESS NOTES
Infusion Nursing Note:  Vaibhav Crabtree presents today for injectafer.    Patient seen by provider today: No   present during visit today: Not Applicable.    Note: N/A.    Intravenous Access:  Peripheral IV placed.    Treatment Conditions:  Not Applicable.      Post Infusion Assessment:  Patient tolerated infusion without incident.  Patient observed for 30 minutes post injectafer per protocol.  Site patent and intact, free from redness, edema or discomfort.  No evidence of extravasations.  Access discontinued per protocol.    Discharge Plan:   Discharge instructions reviewed with: Patient.  Patient and/or family verbalized understanding of discharge instructions and all questions answered.  Copy of AVS reviewed with patient and/or family.  Patient will return 5/15/17 for next appointment.  Patient discharged in stable condition accompanied by: self.  Departure Mode: Ambulatory.    Lucia Chavez RN

## 2017-05-12 NOTE — MR AVS SNAPSHOT
After Visit Summary   5/12/2017    Vaibhav Crabtree    MRN: [id number]           Patient Information     Date Of Birth          1950        Visit Information        Provider Department      5/12/2017 12:00 PM  INFUSION CHAIR 5 [facility]        Today's Diagnoses     Anemia, unspecified    -  1    Intestinal malabsorption, unspecified        Iron adverse reaction, initial encounter           Follow-ups after your visit        Your next 10 appointments already scheduled     May 15, 2017 10:30 AM CDT   Level 1 with  INFUSION CHAIR 17   [facility] ([facility])    [facility]  [address]  [facility] [address] [phone]            May 15, 2017 12:45 PM CDT   New Visit with Jose R Rudd MD   [facility] AT [place] ([facility])    [address]  [facility] [address] [phone]            May 18, 2017  9:00 AM CDT   Pre-Op physical with Xander Lee MD   [facility] ([facility])    [address] [place] [address] [phone]            May 24, 2017   Procedure with Marco A Iyer MD   [facility] (--)    [address]  [facility] [address] [phone]              Who to contact     If you have questions or need follow up information about today's clinic visit or your schedule please contact [facility] directly at [phone].  Normal or non-critical lab and imaging results will be communicated to you by MyChart, letter or phone within 4 business days after the clinic has received the results. If you do not hear from us within 7 days, please contact the clinic through MyChart or phone. If you have a critical or abnormal lab result, we will notify you by phone as soon as  "possible.  Submit refill requests through [employer] or call your pharmacy and they will forward the refill request to us. Please allow 3 business days for your refill to be completed.          Additional Information About Your Visit        [employer]ot Information     [employer] lets you send messages to your doctor, view your test results, renew your prescriptions, schedule appointments and more. To sign up, go to [email or web address] . Click on \"Log in\" on the left side of the screen, which will take you to the Welcome page. Then click on \"Sign up Now\" on the right side of the page.     You will be asked to enter the access code listed below, as well as some personal information. Please follow the directions to create your username and password.     Your access code is: [email or web address]  Expires: 2017  9:53 AM     Your access code will  in 90 days. If you need help or a new code, please call your [place] clinic or [phone].        Care EveryWhere ID     This is your Care EveryWhere ID. This could be used by other organizations to access your [place] medical records  [id number]        Your Vitals Were     Pulse Temperature Respirations             65 97.9  F (36.6  C) (Oral) 18          Blood Pressure from Last 3 Encounters:   17 145/85   17 128/71   17 100/60    Weight from Last 3 Encounters:   17 76.2 kg (168 lb)   17 73.5 kg (162 lb)   17 73.4 kg (161 lb 12.8 oz)              Today, you had the following     No orders found for display       Primary Care Provider Office Phone # Fax #    Xander Lee [phone]       [place] [address] [facility] [address]        Thank you!     Thank you for choosing [facility]  for your care. Our goal is always to provide you with excellent care. Hearing back from our patients is one way we can continue to improve our services. Please take a few minutes to " complete the written survey that you may receive in the mail after your visit with us. Thank you!             Your Updated Medication List - Protect others around you: Learn how to safely use, store and throw away your medicines at www.disposemymeds.org.          This list is accurate as of: 5/12/17  1:18 PM.  Always use your most recent med list.                   Brand Name Dispense Instructions for use    aspirin 325 MG tablet      Take 325 mg by mouth daily       carvedilol 25 MG tablet    COREG    180 tablet    Take 1 tablet (25 mg) by mouth 2 times daily (with meals)       furosemide 20 MG tablet    LASIX    30 tablet    Take 1 tablet (20 mg) by mouth daily as needed       lisinopril 10 MG tablet    PRINIVIL/ZESTRIL    30 tablet    Take 0.5 tablets (5 mg) by mouth daily       omeprazole 20 MG CR capsule    priLOSEC    30 capsule    Take 1 capsule (20 mg) by mouth daily

## 2017-05-15 ENCOUNTER — INFUSION THERAPY VISIT (OUTPATIENT)
Dept: INFUSION THERAPY | Facility: CLINIC | Age: 67
End: 2017-05-15
Attending: PATHOLOGY
Payer: COMMERCIAL

## 2017-05-15 ENCOUNTER — OFFICE VISIT (OUTPATIENT)
Dept: CARDIOLOGY | Facility: CLINIC | Age: 67
End: 2017-05-15
Payer: COMMERCIAL

## 2017-05-15 VITALS
SYSTOLIC BLOOD PRESSURE: 130 MMHG | BODY MASS INDEX: 27 KG/M2 | HEART RATE: 83 BPM | WEIGHT: 172 LBS | HEIGHT: 67 IN | DIASTOLIC BLOOD PRESSURE: 70 MMHG

## 2017-05-15 VITALS
TEMPERATURE: 97.9 F | SYSTOLIC BLOOD PRESSURE: 147 MMHG | RESPIRATION RATE: 16 BRPM | HEART RATE: 63 BPM | DIASTOLIC BLOOD PRESSURE: 83 MMHG

## 2017-05-15 DIAGNOSIS — Z29.9 NEED FOR PROPHYLACTIC MEASURE: ICD-10-CM

## 2017-05-15 DIAGNOSIS — D64.9 ANEMIA, UNSPECIFIED: Primary | ICD-10-CM

## 2017-05-15 DIAGNOSIS — I42.9 IDIOPATHIC CARDIOMYOPATHY (H): Primary | ICD-10-CM

## 2017-05-15 DIAGNOSIS — N18.30 CKD (CHRONIC KIDNEY DISEASE) STAGE 3, GFR 30-59 ML/MIN (H): ICD-10-CM

## 2017-05-15 DIAGNOSIS — I25.10 CORONARY ARTERY DISEASE INVOLVING NATIVE CORONARY ARTERY OF NATIVE HEART WITHOUT ANGINA PECTORIS: ICD-10-CM

## 2017-05-15 PROCEDURE — 63400005 ZZH RX 634: Performed by: PATHOLOGY

## 2017-05-15 PROCEDURE — 99204 OFFICE O/P NEW MOD 45 MIN: CPT | Performed by: INTERNAL MEDICINE

## 2017-05-15 PROCEDURE — 96372 THER/PROPH/DIAG INJ SC/IM: CPT

## 2017-05-15 RX ADMIN — ERYTHROPOIETIN 40000 UNITS: 40000 INJECTION, SOLUTION INTRAVENOUS; SUBCUTANEOUS at 10:28

## 2017-05-15 NOTE — PROGRESS NOTES
Infusion Nursing Note:  Vaibhav Crabtree presents today for Procrit.    Patient seen by provider today: No   present during visit today: Not Applicable.    Note: N/A.    Intravenous Access:  No Intravenous access/labs at this visit.    Treatment Conditions:  Not Applicable.      Post Infusion Assessment:  Patient tolerated injection without incident.  Site patent and intact, free from redness, edema or discomfort.  No evidence of extravasations.  Access discontinued per protocol.    Discharge Plan:   Discharge instructions reviewed with: Patient.  Patient and/or family verbalized understanding of discharge instructions and all questions answered.  Copy of AVS reviewed with patient and/or family.   Patient discharged in stable condition accompanied by: self.  Departure Mode: Ambulatory.    Juancarlos Camacho RN

## 2017-05-15 NOTE — LETTER
5/15/2017    Xander Lee MD  [address] [place] [address]    RE: Vaibhav Crabtree       Dear Colleague,    I had the pleasure of seeing Vaibhav SIMON Bro in the [facility].    HPI and Plan:   This is a nice 66-year-old gentleman followed by Dr. Richard Asif for history of idiopathic cardiomyopathy and systolic congestive heart failure diagnosed in early 2014 in referral for preoperative cardiac evaluation.  At that time and a dilated LV with ejection fraction of 25-30%.  A stress study was without evidence of ischemia.  With medical therapy his ejection fraction improved and has been around 50% most recently.  His dyspnea and shortness of breath resolved with that.  He been maintained on diuretics as part of his adenopathy regimen and has had several issues this winter and spring with lots of orthostatic hypotension and acute on chronic renal insufficiency.  Eventually his diuretics were stopped and he states since that time he's not had any further orthostasis.  He states in the last month he has had no shortness of breath, dyspnea on exertion, orthopnea, PND, edema.  He has been without chest discomfort, syncope or near-syncope, palpitations, abdominal distention or bleeding episodes.  He states his blood pressure has been better and still well controlled.  His only limitation is currently on from his right knee pain that needs replacement.  He has a very remote prior tobacco history.  A follow-up stress nuclear study in 2015 again showed no ischemia.  A CT coronary intervention in 2015 showed some very mild left main and mild to moderate LAD disease.  The stress test was done after that and again had no ischemia.      Exam-full exam below.  An summary:  Blood pressure 1:30/70.  Pulse 80 and regular.  Respirations 14 and unlabored.  Lungs-clear  Cardiac-no murmur rub gallop.  JVD or HJR   abdomen-nondistended, no hepatomegaly or  bruit  Extremities-no cyanosis or edema  Vascular-normal carotid upstrokes without bruits.  Femoral pulses normal without bruits.  Pedal pulses intact  Full exam below.    Impression/plan  1-history of systolic congestive heart failure, resolved with treatment of his idiopathic cardiomyopathy.  No evidence for heart failure currently.  On appropriate medications.  Agree probably does not date his diuretic at this point in time as he tends to get prerenal and over diuresed with fast.    2-idiopathic cardiomyopathy.  Now low-normal ejection fraction.  No arrhythmia or heart failure findings or symptoms.  No evidence of volume overload.  Doing well on current regimen.    3-mild coronary artery disease by CTA.  Currently without ischemic symptoms.  I do think he merits treatment with a statin and this should be considered in the long run, it is not mandatory prior to surgery but starting at before then would be reasonable.  I would use intense statin dosing.  Most recent LDL that I have access to is the year and half old and was 96.    4-chronic renal insufficiency, stage III.  Most recent BMP shows stable function without acute exacerbation a couple of weeks ago, at his current volume status.    5-chronic anemia.  Recent hemoglobin was 9.3 which is down from around 11 at the beginning of this year.  Normocytic and hypochromic.  In looking back it's been this range going back about 6 months.  However one year ago it was 14.2.  Do not think his renal dysfunction is enough to account for this anemia.  I have recommended further follow-up with Dr. Lee    At this time this gentleman is very stable from a cardiovascular standpoint.  He has no heart failure, arrhythmia, or ischemic symptoms and findings.  He has no valvular disease and no recent acute coronary syndromes.  He has a low normal ejection fraction.  He is acceptable to proceed with his knee replacement on his current regimen at this time.  As above I recommend  statin therapy for the long run.      No orders of the defined types were placed in this encounter.      No orders of the defined types were placed in this encounter.      Medications Discontinued During This Encounter   Medication Reason     furosemide (LASIX) 20 MG tablet Therapy completed       Encounter Diagnosis   Name Primary?     Idiopathic cardiomyopathy (H) Yes       CURRENT MEDICATIONS:  Current Outpatient Prescriptions   Medication Sig Dispense Refill     lisinopril (PRINIVIL/ZESTRIL) 10 MG tablet Take 0.5 tablets (5 mg) by mouth daily 30 tablet 1     omeprazole (PRILOSEC) 20 MG CR capsule Take 1 capsule (20 mg) by mouth daily 30 capsule 0     carvedilol (COREG) 25 MG tablet Take 1 tablet (25 mg) by mouth 2 times daily (with meals) (Patient taking differently: Take 25 mg by mouth daily ) 180 tablet 3     aspirin 325 MG tablet Take 325 mg by mouth daily         ALLERGIES     Allergies   Allergen Reactions     No Known Allergies        PAST MEDICAL HISTORY:  Past Medical History:   Diagnosis Date     Bite by unspecified animal(E906.5)      CAD (coronary artery disease)     CTa 1/2015- Ca+ score of 237.02, showed 50-70% stenosis in LAD     Claudication of right lower extremity (H)      Congestive heart failure (H)      Hyperlipidemia      Hypertension      Idiopathic cardiomyopathy (H)     EF 30% on 5/2014 echo, EF improved to 50-55% on 1/12/15 echo     Mitral regurgitation     1+ on 1/2015 echo     Osteoarthritis      PAST SURGICAL HISTORY:  Past Surgical History:   Procedure Laterality Date     C NONSPECIFIC PROCEDURE  2002    ulnar nerve     C NONSPECIFIC PROCEDURE      right rotator cuff repair     ESOPHAGOSCOPY, GASTROSCOPY, DUODENOSCOPY (EGD), COMBINED N/A 1/24/2017    Procedure: COMBINED ESOPHAGOSCOPY, GASTROSCOPY, DUODENOSCOPY (EGD), BIOPSY SINGLE OR MULTIPLE;  Surgeon: Reji Mcghee MD;  Location:  GI     HC REMOVAL OF TONSILS,<9 Y/O      Tonsils <10y.o.     HC REPAIR ROTATOR CUFF,ACUTE  02 and  "03    left rotator cuff repair     FAMILY HISTORY:  Family History   Problem Relation Age of Onset     Respiratory Father      COPD     Unknown/Adopted Mother      CANCER Brother      CANCER Brother      Connective Tissue Disorder Paternal Grandfather      Depression Daughter      Eye Disorder Son      SOCIAL HISTORY:  Social History     Social History     Marital status:      Spouse name: lucian     Number of children: 2     Years of education: 9     Occupational History     [employer] man       [employer]     Social History Main Topics     Smoking status: Former Smoker     Packs/day: 4.00     Years: 15.00     Types: Cigarettes     Quit date: 6/20/1971     Smokeless tobacco: Never Used     Alcohol use 0.0 oz/week     0 Standard drinks or equivalent per week      Comment: occ. beer     Drug use: No     Sexual activity: Yes     Partners: Female     Other Topics Concern      Service No     Blood Transfusions No     Caffeine Concern No     Occupational Exposure No     Hobby Hazards No     Sleep Concern Yes     Waking with hot sweats     Stress Concern No     Weight Concern No     Special Diet No     Back Care No     Exercise Yes     Walks all day     Bike Helmet No     Seat Belt Yes     Self-Exams No     Social History Narrative       Review of Systems:  Skin:  Negative       Eyes:  Positive for glasses    ENT:  Negative      Respiratory:  Negative       Cardiovascular:  Negative Positive for;fatigue;exercise intolerance    Gastroenterology: Negative      Genitourinary:  Negative      Musculoskeletal:  Positive for joint pain    Neurologic:  Negative      Psychiatric:  Negative      Heme/Lymph/Imm:  Negative      Endocrine:  Negative        Physical Exam:  Vitals: /70  Pulse 83  Ht 1.702 m (5' 7\")  Wt 78 kg (172 lb)  BMI 26.94 kg/m2    Constitutional:  cooperative;alert and oriented;well developed chronically ill      Skin:  warm and dry to the touch        Head:  normocephalic, no masses or lesions  "       Eyes:  pupils equal and round;sclera white;EOMS intact        ENT:  no pallor or cyanosis        Neck:  carotid pulses are full and equal bilaterally;JVP normal;no carotid bruit (no HJR)        Chest:  clear to auscultation;normal respiratory excursion          Cardiac: regular rhythm;no murmurs, gallops or rubs detected;normal S1 and S2;no S3 or S4                  Abdomen:  abdomen soft;no masses;non-tender;no bruits        Vascular: pulses full and equal, no bruits auscultated                                        Extremities and Back:  no deformities, clubbing, cyanosis, erythema observed;no edema stasis pigmentation trace          Neurological:  affect appropriate, oriented to time, person and place;no gross motor deficits        Thank you for allowing me to participate in the care of your patient.    Sincerely,     Jose R Rudd MD     [facility]

## 2017-05-15 NOTE — PROGRESS NOTES
HPI and Plan:   This is a nice 66-year-old gentleman followed by Dr. Richard Asif for history of idiopathic cardiomyopathy and systolic congestive heart failure diagnosed in early 2014 in referral for preoperative cardiac evaluation.  At that time and a dilated LV with ejection fraction of 25-30%.  A stress study was without evidence of ischemia.  With medical therapy his ejection fraction improved and has been around 50% most recently.  His dyspnea and shortness of breath resolved with that.  He been maintained on diuretics as part of his adenopathy regimen and has had several issues this winter and spring with lots of orthostatic hypotension and acute on chronic renal insufficiency.  Eventually his diuretics were stopped and he states since that time he's not had any further orthostasis.  He states in the last month he has had no shortness of breath, dyspnea on exertion, orthopnea, PND, edema.  He has been without chest discomfort, syncope or near-syncope, palpitations, abdominal distention or bleeding episodes.  He states his blood pressure has been better and still well controlled.  His only limitation is currently on from his right knee pain that needs replacement.  He has a very remote prior tobacco history.  A follow-up stress nuclear study in 2015 again showed no ischemia.  A CT coronary intervention in 2015 showed some very mild left main and mild to moderate LAD disease.  The stress test was done after that and again had no ischemia.      Exam-full exam below.  An summary:  Blood pressure 1:30/70.  Pulse 80 and regular.  Respirations 14 and unlabored.  Lungs-clear  Cardiac-no murmur rub gallop.  JVD or HJR   abdomen-nondistended, no hepatomegaly or bruit  Extremities-no cyanosis or edema  Vascular-normal carotid upstrokes without bruits.  Femoral pulses normal without bruits.  Pedal pulses intact  Full exam below.    Impression/plan  1-history of systolic congestive heart failure, resolved with treatment  of his idiopathic cardiomyopathy.  No evidence for heart failure currently.  On appropriate medications.  Agree probably does not date his diuretic at this point in time as he tends to get prerenal and over diuresed with fast.    2-idiopathic cardiomyopathy.  Now low-normal ejection fraction.  No arrhythmia or heart failure findings or symptoms.  No evidence of volume overload.  Doing well on current regimen.    3-mild coronary artery disease by CTA.  Currently without ischemic symptoms.  I do think he merits treatment with a statin and this should be considered in the long run, it is not mandatory prior to surgery but starting at before then would be reasonable.  I would use intense statin dosing.  Most recent LDL that I have access to is the year and half old and was 96.    4-chronic renal insufficiency, stage III.  Most recent BMP shows stable function without acute exacerbation a couple of weeks ago, at his current volume status.    5-chronic anemia.  Recent hemoglobin was 9.3 which is down from around 11 at the beginning of this year.  Normocytic and hypochromic.  In looking back it's been this range going back about 6 months.  However one year ago it was 14.2.  Do not think his renal dysfunction is enough to account for this anemia.  I have recommended further follow-up with Dr. Lee    At this time this gentleman is very stable from a cardiovascular standpoint.  He has no heart failure, arrhythmia, or ischemic symptoms and findings.  He has no valvular disease and no recent acute coronary syndromes.  He has a low normal ejection fraction.  He is acceptable to proceed with his knee replacement on his current regimen at this time.  As above I recommend statin therapy for the long run.      No orders of the defined types were placed in this encounter.      No orders of the defined types were placed in this encounter.      Medications Discontinued During This Encounter   Medication Reason     furosemide (LASIX)  20 MG tablet Therapy completed         Encounter Diagnosis   Name Primary?     Idiopathic cardiomyopathy (H) Yes       CURRENT MEDICATIONS:  Current Outpatient Prescriptions   Medication Sig Dispense Refill     lisinopril (PRINIVIL/ZESTRIL) 10 MG tablet Take 0.5 tablets (5 mg) by mouth daily 30 tablet 1     omeprazole (PRILOSEC) 20 MG CR capsule Take 1 capsule (20 mg) by mouth daily 30 capsule 0     carvedilol (COREG) 25 MG tablet Take 1 tablet (25 mg) by mouth 2 times daily (with meals) (Patient taking differently: Take 25 mg by mouth daily ) 180 tablet 3     aspirin 325 MG tablet Take 325 mg by mouth daily         ALLERGIES     Allergies   Allergen Reactions     No Known Allergies        PAST MEDICAL HISTORY:  Past Medical History:   Diagnosis Date     Bite by unspecified animal(E906.5)      CAD (coronary artery disease)     CTa 1/2015- Ca+ score of 237.02, showed 50-70% stenosis in LAD     Claudication of right lower extremity (H)      Congestive heart failure (H)      Hyperlipidemia      Hypertension      Idiopathic cardiomyopathy (H)     EF 30% on 5/2014 echo, EF improved to 50-55% on 1/12/15 echo     Mitral regurgitation     1+ on 1/2015 echo     Osteoarthritis        PAST SURGICAL HISTORY:  Past Surgical History:   Procedure Laterality Date     C NONSPECIFIC PROCEDURE  2002    ulnar nerve     C NONSPECIFIC PROCEDURE      right rotator cuff repair     ESOPHAGOSCOPY, GASTROSCOPY, DUODENOSCOPY (EGD), COMBINED N/A 1/24/2017    Procedure: COMBINED ESOPHAGOSCOPY, GASTROSCOPY, DUODENOSCOPY (EGD), BIOPSY SINGLE OR MULTIPLE;  Surgeon: Reji Mcghee MD;  Location:  GI     HC REMOVAL OF TONSILS,<9 Y/O      Tonsils <10y.o.     HC REPAIR ROTATOR CUFF,ACUTE  02 and 03    left rotator cuff repair       FAMILY HISTORY:  Family History   Problem Relation Age of Onset     Respiratory Father      COPD     Unknown/Adopted Mother      CANCER Brother      CANCER Brother      Connective Tissue Disorder Paternal Grandfather   "    Depression Daughter      Eye Disorder Son        SOCIAL HISTORY:  Social History     Social History     Marital status:      Spouse name: lucian     Number of children: 2     Years of education: 9     Occupational History     [employer]       [employer]     Social History Main Topics     Smoking status: Former Smoker     Packs/day: 4.00     Years: 15.00     Types: Cigarettes     Quit date: 6/20/1971     Smokeless tobacco: Never Used     Alcohol use 0.0 oz/week     0 Standard drinks or equivalent per week      Comment: occ. beer     Drug use: No     Sexual activity: Yes     Partners: Female     Other Topics Concern      Service No     Blood Transfusions No     Caffeine Concern No     Occupational Exposure No     Hobby Hazards No     Sleep Concern Yes     Waking with hot sweats     Stress Concern No     Weight Concern No     Special Diet No     Back Care No     Exercise Yes     Walks all day     Bike Helmet No     Seat Belt Yes     Self-Exams No     Social History Narrative       Review of Systems:  Skin:  Negative       Eyes:  Positive for glasses    ENT:  Negative      Respiratory:  Negative       Cardiovascular:  Negative Positive for;fatigue;exercise intolerance    Gastroenterology: Negative      Genitourinary:  Negative      Musculoskeletal:  Positive for joint pain    Neurologic:  Negative      Psychiatric:  Negative      Heme/Lymph/Imm:  Negative      Endocrine:  Negative        Physical Exam:  Vitals: /70  Pulse 83  Ht 1.702 m (5' 7\")  Wt 78 kg (172 lb)  BMI 26.94 kg/m2    Constitutional:  cooperative;alert and oriented;well developed chronically ill      Skin:  warm and dry to the touch        Head:  normocephalic, no masses or lesions        Eyes:  pupils equal and round;sclera white;EOMS intact        ENT:  no pallor or cyanosis        Neck:  carotid pulses are full and equal bilaterally;JVP normal;no carotid bruit (no HJR)        Chest:  clear to auscultation;normal respiratory " excursion          Cardiac: regular rhythm;no murmurs, gallops or rubs detected;normal S1 and S2;no S3 or S4                  Abdomen:  abdomen soft;no masses;non-tender;no bruits        Vascular: pulses full and equal, no bruits auscultated                                        Extremities and Back:  no deformities, clubbing, cyanosis, erythema observed;no edema stasis pigmentation trace          Neurological:  affect appropriate, oriented to time, person and place;no gross motor deficits              CC  Xander Lee MD  [address] [address]

## 2017-05-15 NOTE — MR AVS SNAPSHOT
After Visit Summary   5/15/2017    Vaibhav Crabtree    MRN: [id number]           Patient Information     Date Of Birth          1950        Visit Information        Provider Department      5/15/2017 12:45 PM Jose R Rudd MD [facility]        Today's Diagnoses     Idiopathic cardiomyopathy (H)    -  1    CKD (chronic kidney disease) stage 3, GFR 30-59 ml/min        Coronary artery disease involving native coronary artery of native heart without angina pectoris           Follow-ups after your visit        Your next 10 appointments already scheduled     May 18, 2017  9:00 AM CDT   Pre-Op physical with Xander Lee MD   [facility] ([facility])    [address] [address] [phone]            May 24, 2017   Procedure with Marco A Iyer MD   [facility] (--)    [address]  [facility] [address] [phone]              Who to contact     If you have questions or need follow up information about today's clinic visit or your schedule please contact [facility] directly at [phone].  Normal or non-critical lab and imaging results will be communicated to you by MyChart, letter or phone within 4 business days after the clinic has received the results. If you do not hear from us within 7 days, please contact the clinic through [employer] or phone. If you have a critical or abnormal lab result, we will notify you by phone as soon as possible.  Submit refill requests through [employer] or call your pharmacy and they will forward the refill request to us. Please allow 3 business days for your refill to be completed.          Additional Information About Your Visit        [employer] Information     [employer] lets you send messages to your doctor, view your test results, renew your prescriptions, schedule  "appointments and more. To sign up, go to [email or web address] . Click on \"Log in\" on the left side of the screen, which will take you to the Welcome page. Then click on \"Sign up Now\" on the right side of the page.     You will be asked to enter the access code listed below, as well as some personal information. Please follow the directions to create your username and password.     Your access code is: [email or web address]  Expires: 2017  9:53 AM     Your access code will  in 90 days. If you need help or a new code, please call your [place] clinic or [phone].        Care EveryWhere ID     This is your Care EveryWhere ID. This could be used by other organizations to access your [place] medical records  [id number]        Your Vitals Were     Pulse Height BMI (Body Mass Index)             83 1.702 m (5' 7\") 26.94 kg/m2          Blood Pressure from Last 3 Encounters:   05/15/17 130/70   05/15/17 147/83   17 145/85    Weight from Last 3 Encounters:   05/15/17 78 kg (172 lb)   17 76.2 kg (168 lb)   17 73.5 kg (162 lb)              Today, you had the following     No orders found for display         Today's Medication Changes          These changes are accurate as of: 5/15/17  1:33 PM.  If you have any questions, ask your nurse or doctor.               These medicines have changed or have updated prescriptions.        Dose/Directions    carvedilol 25 MG tablet   Commonly known as:  COREG   This may have changed:  when to take this   Used for:  Essential hypertension, Chronic combined systolic and diastolic congestive heart failure (H)        Dose:  25 mg   Take 1 tablet (25 mg) by mouth 2 times daily (with meals)   Quantity:  180 tablet   Refills:  3                Primary Care Provider Office Phone # Fax #    Xander Lee [phone]       [address] [address]        Thank you!     Thank you for choosing [facility] AT [place]  for your care. Our goal is always to provide you with excellent care. Hearing back from our patients is one way we can continue to improve our services. Please take a few minutes to complete the written survey that you may receive in the mail after your visit with us. Thank you!             Your Updated Medication List - Protect others around you: Learn how to safely use, store and throw away your medicines at www.disposemymeds.org.          This list is accurate as of: 5/15/17  1:33 PM.  Always use your most recent med list.                   Brand Name Dispense Instructions for use    aspirin 325 MG tablet      Take 325 mg by mouth daily       carvedilol 25 MG tablet    COREG    180 tablet    Take 1 tablet (25 mg) by mouth 2 times daily (with meals)       lisinopril 10 MG tablet    PRINIVIL/ZESTRIL    30 tablet    Take 0.5 tablets (5 mg) by mouth daily       omeprazole 20 MG CR capsule    priLOSEC    30 capsule    Take 1 capsule (20 mg) by mouth daily

## 2017-05-15 NOTE — MR AVS SNAPSHOT
After Visit Summary   5/15/2017    Vaibhav Crabtree    MRN: [id number]           Patient Information     Date Of Birth          1950        Visit Information        Provider Department      5/15/2017 10:30 AM  INFUSION CHAIR 17 [facility]        Today's Diagnoses     Anemia, unspecified    -  1    Need for prophylactic measure           Follow-ups after your visit        Your next 10 appointments already scheduled     May 15, 2017 10:30 AM CDT   Level 1 with  INFUSION CHAIR 17   [facility] ([facility])    [facility]  [address]  [facility] [address] [phone]            May 15, 2017 12:45 PM CDT   New Visit with Jose R Rudd MD   [facility] [place] ([facility])    [address]  [facility] [id number] [phone]            May 18, 2017  9:00 AM CDT   Pre-Op physical with Xander Lee MD   [facility] ([facility])    [address] [place] [id number] [phone]            May 24, 2017   Procedure with Marco A Iyer MD   [facility] (--)    [address] [facility]  [facility] [address] [phone]              Who to contact     If you have questions or need follow up information about today's clinic visit or your schedule please contact [facility] directly at [phone].  Normal or non-critical lab and imaging results will be communicated to you by MyChart, letter or phone within 4 business days after the clinic has received the results. If you do not hear from us within 7 days, please contact the clinic through MyChart or phone. If you have a critical or abnormal lab result, we will notify you by phone as soon as possible.  Submit refill requests through [employer] or call your  "pharmacy and they will forward the refill request to us. Please allow 3 business days for your refill to be completed.          Additional Information About Your Visit        MyChart Information     [employer] lets you send messages to your doctor, view your test results, renew your prescriptions, schedule appointments and more. To sign up, go to [email or web address] . Click on \"Log in\" on the left side of the screen, which will take you to the Welcome page. Then click on \"Sign up Now\" on the right side of the page.     You will be asked to enter the access code listed below, as well as some personal information. Please follow the directions to create your username and password.     Your access code is: [email or web address]  Expires: 2017  9:53 AM     Your access code will  in 90 days. If you need help or a new code, please call your [place] clinic or [phone].        Care EveryWhere ID     This is your [facility] EveryWhere ID. This could be used by other organizations to access your [place] medical records  [id number]        Your Vitals Were     Pulse Temperature Respirations             63 97.9  F (36.6  C) (Oral) 16          Blood Pressure from Last 3 Encounters:   05/15/17 147/83   17 145/85   17 128/71    Weight from Last 3 Encounters:   17 76.2 kg (168 lb)   17 73.5 kg (162 lb)   17 73.4 kg (161 lb 12.8 oz)              We Performed the Following     Treatment Conditions        Primary Care Provider Office Phone # Fax #    Xander Lee [phone]       [place] [address] [facility] [address]        Thank you!     Thank you for choosing [facility]  for your care. Our goal is always to provide you with excellent care. Hearing back from our patients is one way we can continue to improve our services. Please take a few minutes to complete the written survey that you may receive in the mail after " your visit with us. Thank you!             Your Updated Medication List - Protect others around you: Learn how to safely use, store and throw away your medicines at www.disposemymeds.org.          This list is accurate as of: 5/15/17 10:18 AM.  Always use your most recent med list.                   Brand Name Dispense Instructions for use    aspirin 325 MG tablet      Take 325 mg by mouth daily       carvedilol 25 MG tablet    COREG    180 tablet    Take 1 tablet (25 mg) by mouth 2 times daily (with meals)       furosemide 20 MG tablet    LASIX    30 tablet    Take 1 tablet (20 mg) by mouth daily as needed       lisinopril 10 MG tablet    PRINIVIL/ZESTRIL    30 tablet    Take 0.5 tablets (5 mg) by mouth daily       omeprazole 20 MG CR capsule    priLOSEC    30 capsule    Take 1 capsule (20 mg) by mouth daily

## 2017-05-18 ENCOUNTER — OFFICE VISIT (OUTPATIENT)
Dept: FAMILY MEDICINE | Facility: CLINIC | Age: 67
End: 2017-05-18
Payer: COMMERCIAL

## 2017-05-18 VITALS
HEART RATE: 64 BPM | BODY MASS INDEX: 26.84 KG/M2 | TEMPERATURE: 98.4 F | OXYGEN SATURATION: 100 % | HEIGHT: 67 IN | RESPIRATION RATE: 16 BRPM | SYSTOLIC BLOOD PRESSURE: 138 MMHG | DIASTOLIC BLOOD PRESSURE: 74 MMHG | WEIGHT: 171 LBS

## 2017-05-18 DIAGNOSIS — D64.9 ANEMIA, UNSPECIFIED TYPE: ICD-10-CM

## 2017-05-18 DIAGNOSIS — Z01.818 PREOP GENERAL PHYSICAL EXAM: Primary | ICD-10-CM

## 2017-05-18 LAB
ANION GAP SERPL CALCULATED.3IONS-SCNC: 6 MMOL/L (ref 3–14)
BASOPHILS # BLD AUTO: 0.1 10E9/L (ref 0–0.2)
BASOPHILS NFR BLD AUTO: 1.7 %
BUN SERPL-MCNC: 11 MG/DL (ref 7–30)
CALCIUM SERPL-MCNC: 8.9 MG/DL (ref 8.5–10.1)
CHLORIDE SERPL-SCNC: 111 MMOL/L (ref 94–109)
CO2 SERPL-SCNC: 27 MMOL/L (ref 20–32)
CREAT SERPL-MCNC: 1.21 MG/DL (ref 0.66–1.25)
DIFFERENTIAL METHOD BLD: ABNORMAL
EOSINOPHIL # BLD AUTO: 0.2 10E9/L (ref 0–0.7)
EOSINOPHIL NFR BLD AUTO: 3.2 %
ERYTHROCYTE [DISTWIDTH] IN BLOOD BY AUTOMATED COUNT: 26.6 % (ref 10–15)
FERRITIN SERPL-MCNC: 546 NG/ML (ref 26–388)
GFR SERPL CREATININE-BSD FRML MDRD: 59 ML/MIN/1.7M2
GLUCOSE SERPL-MCNC: 97 MG/DL (ref 70–99)
HCT VFR BLD AUTO: 39.1 % (ref 40–53)
HGB BLD-MCNC: 11.5 G/DL (ref 13.3–17.7)
IMM GRANULOCYTES # BLD: 0.2 10E9/L (ref 0–0.4)
IMM GRANULOCYTES NFR BLD: 3.8 %
LYMPHOCYTES # BLD AUTO: 1.2 10E9/L (ref 0.8–5.3)
LYMPHOCYTES NFR BLD AUTO: 26.4 %
MCH RBC QN AUTO: 26.8 PG (ref 26.5–33)
MCHC RBC AUTO-ENTMCNC: 29.4 G/DL (ref 31.5–36.5)
MCV RBC AUTO: 91 FL (ref 78–100)
MONOCYTES # BLD AUTO: 0.4 10E9/L (ref 0–1.3)
MONOCYTES NFR BLD AUTO: 8.3 %
NEUTROPHILS # BLD AUTO: 2.7 10E9/L (ref 1.6–8.3)
NEUTROPHILS NFR BLD AUTO: 56.6 %
NRBC # BLD AUTO: 0 10*3/UL
NRBC BLD AUTO-RTO: 1 /100
PLATELET # BLD AUTO: 299 10E9/L (ref 150–450)
POTASSIUM SERPL-SCNC: 5 MMOL/L (ref 3.4–5.3)
RBC # BLD AUTO: 4.29 10E12/L (ref 4.4–5.9)
RETICS # AUTO: 281 10E9/L (ref 25–95)
RETICS/RBC NFR AUTO: 6.6 % (ref 0.5–2)
SODIUM SERPL-SCNC: 144 MMOL/L (ref 133–144)
WBC # BLD AUTO: 4.7 10E9/L (ref 4–11)

## 2017-05-18 PROCEDURE — 36415 COLL VENOUS BLD VENIPUNCTURE: CPT | Performed by: FAMILY MEDICINE

## 2017-05-18 PROCEDURE — 80048 BASIC METABOLIC PNL TOTAL CA: CPT | Performed by: FAMILY MEDICINE

## 2017-05-18 PROCEDURE — 85045 AUTOMATED RETICULOCYTE COUNT: CPT | Performed by: FAMILY MEDICINE

## 2017-05-18 PROCEDURE — 82728 ASSAY OF FERRITIN: CPT | Performed by: FAMILY MEDICINE

## 2017-05-18 PROCEDURE — 85060 BLOOD SMEAR INTERPRETATION: CPT | Performed by: FAMILY MEDICINE

## 2017-05-18 PROCEDURE — 99214 OFFICE O/P EST MOD 30 MIN: CPT | Performed by: FAMILY MEDICINE

## 2017-05-18 PROCEDURE — 85025 COMPLETE CBC W/AUTO DIFF WBC: CPT | Performed by: FAMILY MEDICINE

## 2017-05-18 NOTE — MR AVS SNAPSHOT
After Visit Summary   5/18/2017    Vaibhav Crabtree    MRN: [id number]           Patient Information     Date Of Birth          1950        Visit Information        Provider Department      5/18/2017 9:00 AM Xander Lee MD [facility] [place]        Today's Diagnoses     Preop general physical exam    -  1    Anemia, unspecified type          Care Instructions      Before Your Surgery      Call your surgeon if there is any change in your health. This includes signs of a cold or flu (such as a sore throat, runny nose, cough, rash or fever).    Do not smoke, drink alcohol or take over the counter medicine (unless your surgeon or primary care doctor tells you to) for the 24 hours before and after surgery.    If you take prescribed drugs: Follow your doctor s orders about which medicines to take and which to stop until after surgery.    Eating and drinking prior to surgery: follow the instructions from your surgeon    Take a shower or bath the night before surgery. Use the soap your surgeon gave you to gently clean your skin. If you do not have soap from your surgeon, use your regular soap. Do not shave or scrub the surgery site.  Wear clean pajamas and have clean sheets on your bed.         Follow-ups after your visit        Your next 10 appointments already scheduled     May 24, 2017   Procedure with Marco A Iyer MD   [facility] (--)    [address]  [facility] [address] [phone]              Who to contact     If you have questions or need follow up information about today's clinic visit or your schedule please contact [facility] [place] directly at [phone].  Normal or non-critical lab and imaging results will be communicated to you by MyChart, letter or phone within 4 business days after the clinic has received the results. If you do not hear from us within 7 days, please contact the clinic through MyChart or phone. If you  "have a critical or abnormal lab result, we will notify you by phone as soon as possible.  Submit refill requests through [employer] or call your pharmacy and they will forward the refill request to us. Please allow 3 business days for your refill to be completed.          Additional Information About Your Visit        [employer] Information     [employer] lets you send messages to your doctor, view your test results, renew your prescriptions, schedule appointments and more. To sign up, go to [email or web address] . Click on \"Log in\" on the left side of the screen, which will take you to the Welcome page. Then click on \"Sign up Now\" on the right side of the page.     You will be asked to enter the access code listed below, as well as some personal information. Please follow the directions to create your username and password.     Your access code is: [email or web address]  Expires: 2017  9:53 AM     Your access code will  in 90 days. If you need help or a new code, please call your [place] clinic or [phone].        Care EveryWhere ID     This is your Care EveryWhere ID. This could be used by other organizations to access your [place] medical records  [id number]        Your Vitals Were     Pulse Temperature Respirations Height Pulse Oximetry BMI (Body Mass Index)    64 98.4  F (36.9  C) 16 5' 7\" (1.702 m) 100% 26.78 kg/m2       Blood Pressure from Last 3 Encounters:   17 138/74   05/15/17 130/70   05/15/17 147/83    Weight from Last 3 Encounters:   17 171 lb (77.6 kg)   05/15/17 172 lb (78 kg)   17 168 lb (76.2 kg)              We Performed the Following     Basic metabolic panel  (Ca, Cl, CO2, Creat, Gluc, K, Na, BUN)     Blood Morphology Pathologist Review     CBC with platelets and differential     Ferritin     Reticulocyte Count          Today's Medication Changes          These changes are accurate as of: 17  3:44 PM.  If you have any questions, ask your nurse or doctor.             "   These medicines have changed or have updated prescriptions.        Dose/Directions    carvedilol 25 MG tablet   Commonly known as:  COREG   This may have changed:  when to take this   Used for:  Essential hypertension, Chronic combined systolic and diastolic congestive heart failure (H)        Dose:  25 mg   Take 1 tablet (25 mg) by mouth 2 times daily (with meals)   Quantity:  180 tablet   Refills:  3                Primary Care Provider Office Phone # Fax #    Xander Lee [phone]       [address] [address]        Thank you!     Thank you for choosing [facility]  for your care. Our goal is always to provide you with excellent care. Hearing back from our patients is one way we can continue to improve our services. Please take a few minutes to complete the written survey that you may receive in the mail after your visit with us. Thank you!             Your Updated Medication List - Protect others around you: Learn how to safely use, store and throw away your medicines at www.disposemymeds.org.          This list is accurate as of: 5/18/17  3:44 PM.  Always use your most recent med list.                   Brand Name Dispense Instructions for use    aspirin 325 MG tablet      Take 325 mg by mouth daily       carvedilol 25 MG tablet    COREG    180 tablet    Take 1 tablet (25 mg) by mouth 2 times daily (with meals)       lisinopril 10 MG tablet    PRINIVIL/ZESTRIL    30 tablet    Take 0.5 tablets (5 mg) by mouth daily

## 2017-05-18 NOTE — PROGRESS NOTES
"Chief Complaint   Patient presents with     Pre-Op Exam     rt total knee 17 @[facility]       Initial /74  Pulse 64  Temp 98.4  F (36.9  C)  Resp 16  Ht 5' 7\" (1.702 m)  Wt 171 lb (77.6 kg)  SpO2 100%  BMI 26.78 kg/m2 Estimated body mass index is 26.78 kg/(m^2) as calculated from the following:    Height as of this encounter: 5' 7\" (1.702 m).    Weight as of this encounter: 171 lb (77.6 kg).  Medication Reconciliation: complete. ANA Moreno LPN      [address] [id number] [phone]  Dept: [phone]    PRE-OP EVALUATION:  Today's date: 2017    Vaibhav Crabtree (: 1950) presents for pre-operative evaluation assessment as requested by Dr. Iyer.  He requires evaluation and anesthesia risk assessment prior to undergoing surgery/procedure for treatment of Rt knee .  Proposed procedure: total replacement    Date of Surgery/ Procedure: 17  Time of Surgery/ Procedure: 8:30a.m.  Hospital/Surgical Facility: [facility]  [phone]  Primary Physician: Xander Lee  Type of Anesthesia Anticipated: to be determined    Patient has a Health Care Directive or Living Will:  YES     1. NO - Do you have a history of heart attack, stroke, stent, bypass or surgery on an artery in the head, neck, heart or legs?  2. NO - Do you ever have any pain or discomfort in your chest?  3. YES - Do you have a history of  Heart Failure?  4. NO - Are you troubled by shortness of breath when: walking on the level, up a slight hill or at night?  5. NO - Do you currently have a cold, bronchitis or other respiratory infection?  6. NO - Do you have a cough, shortness of breath or wheezing?  7. NO - Do you sometimes get pains in the calves of your legs when you walk?  8. YES - Do you or anyone in your family have previous history of blood clots? Patients wife  9. NO - Do you or does anyone in your family have a serious bleeding problem such as prolonged bleeding " following surgeries or cuts?  10. NO - Have you ever had problems with anemia or been told to take iron pills?  11. NO - Have you had any abnormal blood loss such as black, tarry or bloody stools, or abnormal vaginal bleeding?  12. NO - Have you ever had a blood transfusion?  13. NO - Have you or any of your relatives ever had problems with anesthesia?  14. NO - Do you have sleep apnea, excessive snoring or daytime drowsiness?  15. NO - Do you have any prosthetic heart valves?  16. NO - Do you have prosthetic joints?  17. NO - Is there any chance that you may be pregnant?    ANA Moreno LPN      HPI:                                                      See problem list for active medical problems.  Problems all longstanding and stable, except as noted/documented.  See ROS for pertinent symptoms related to these conditions.                                                                                                  .    MEDICAL HISTORY:                                                      Patient Active Problem List    Diagnosis Date Noted     Anemia, unspecified 05/04/2017     Priority: Medium     Intestinal malabsorption, unspecified 05/04/2017     Priority: Medium     Iron adverse reaction 05/04/2017     Priority: Medium     Need for prophylactic measure 05/04/2017     Priority: Medium     Orthostatic hypotension 01/04/2017     Priority: Medium     RAYSHAWN (acute kidney injury) (H) 02/12/2015     Priority: Medium     Claudication of right lower extremity (H)      Priority: Medium     Atypical chest pain 01/26/2015     Priority: Medium     Idiopathic cardiomyopathy (H)      Priority: Medium     EF 30% on 5/2014 echo, EF improved to 50-55% on 1/12/15 echo       Congestive heart failure (H)      Priority: Medium     Hypertension      Priority: Medium     Hyperlipidemia      Priority: Medium     CAD (coronary artery disease)      Priority: Medium     CTa 1/2015- Ca+ score of 237.02, showed 50-70% stenosis in LAD        Mitral regurgitation      Priority: Medium     1+ on 1/2015 echo       Unstable angina (H) 01/11/2015     Priority: Medium     Advanced directives, counseling/discussion 09/10/2012     Priority: Medium     Advance Care Planning:   Receipt of ACP document:  Received: Health Care Directive which was witnessed or notarized on 6/12/13.  Document not previously scanned.  Validation form completed and sent with document to be scanned.  Code Status not addressed on this form.  Confirmed/documented designated decision maker(s). See permanent comments section of demographics in clinical tab. View document(s) and details by clicking on code status.   Added by Nadia Hassan on 6/25/2013                  Arthritis of knee, right 09/10/2012     Priority: Medium     Pain, joint, knee, right 08/22/2012     Priority: Medium     arthritis        CARDIOVASCULAR SCREENING; LDL GOAL LESS THAN 130 10/31/2010     Priority: Medium     Adjustment reaction 06/29/2010     Priority: Medium     Problem list name updated by automated process. Provider to review       Impotence of organic origin 07/09/2008     Priority: Medium     Dyspnea and respiratory abnormality 11/29/2005     Priority: Medium     Problem list name updated by automated process. Provider to review        Past Medical History:   Diagnosis Date     Bite by unspecified animal(E906.5)      CAD (coronary artery disease)     CTa 1/2015- Ca+ score of 237.02, showed 50-70% stenosis in LAD     Claudication of right lower extremity (H)      Congestive heart failure (H)      Hyperlipidemia      Hypertension      Idiopathic cardiomyopathy (H)     EF 30% on 5/2014 echo, EF improved to 50-55% on 1/12/15 echo     Mitral regurgitation     1+ on 1/2015 echo     Osteoarthritis      Past Surgical History:   Procedure Laterality Date     C NONSPECIFIC PROCEDURE  2002    ulnar nerve     C NONSPECIFIC PROCEDURE      right rotator cuff repair     ESOPHAGOSCOPY, GASTROSCOPY, DUODENOSCOPY (EGD),  "COMBINED N/A 1/24/2017    Procedure: COMBINED ESOPHAGOSCOPY, GASTROSCOPY, DUODENOSCOPY (EGD), BIOPSY SINGLE OR MULTIPLE;  Surgeon: Reji Mcghee MD;  Location:  GI     HC REMOVAL OF TONSILS,<11 Y/O      Tonsils <10y.o.     HC REPAIR ROTATOR CUFF,ACUTE  02 and 03    left rotator cuff repair     Current Outpatient Prescriptions   Medication Sig Dispense Refill     lisinopril (PRINIVIL/ZESTRIL) 10 MG tablet Take 0.5 tablets (5 mg) by mouth daily 30 tablet 1     carvedilol (COREG) 25 MG tablet Take 1 tablet (25 mg) by mouth 2 times daily (with meals) (Patient taking differently: Take 25 mg by mouth daily ) 180 tablet 3     aspirin 325 MG tablet Take 325 mg by mouth daily       OTC products: None, except as noted above    Allergies   Allergen Reactions     No Known Allergies       Latex Allergy: NO    Social History   Substance Use Topics     Smoking status: Former Smoker     Packs/day: 4.00     Years: 15.00     Types: Cigarettes     Quit date: 6/20/1971     Smokeless tobacco: Never Used     Alcohol use 0.0 oz/week     0 Standard drinks or equivalent per week      Comment: occ. beer     History   Drug Use No       REVIEW OF SYSTEMS:                                                    C: NEGATIVE for fever, chills, change in weight  E/M: NEGATIVE for ear, mouth and throat problems  R: NEGATIVE for significant cough or SOB  CV: NEGATIVE for chest pain, palpitations or peripheral edema    EXAM:                                                    /74  Pulse 64  Temp 98.4  F (36.9  C)  Resp 16  Ht 5' 7\" (1.702 m)  Wt 171 lb (77.6 kg)  SpO2 100%  BMI 26.78 kg/m2  GENERAL APPEARANCE: healthy, alert and no distress  HENT: ear canals and TM's normal and nose and mouth without ulcers or lesions  RESP: lungs clear to auscultation - no rales, rhonchi or wheezes  CV: regular rate and rhythm, normal S1 S2, no S3 or S4 and no murmur, click or rub   ABDOMEN: soft, nontender, no HSM or masses and bowel sounds " normal  NEURO: Normal strength and tone, sensory exam grossly normal, mentation intact and speech normal    DIAGNOSTICS:                                                      Labs Resulted Today:   Results for orders placed or performed in visit on 05/18/17   Ferritin   Result Value Ref Range    Ferritin 546 (H) 26 - 388 ng/mL   Basic metabolic panel  (Ca, Cl, CO2, Creat, Gluc, K, Na, BUN)   Result Value Ref Range    Sodium 144 133 - 144 mmol/L    Potassium 5.0 3.4 - 5.3 mmol/L    Chloride 111 (H) 94 - 109 mmol/L    Carbon Dioxide 27 20 - 32 mmol/L    Anion Gap 6 3 - 14 mmol/L    Glucose 97 70 - 99 mg/dL    Urea Nitrogen 11 7 - 30 mg/dL    Creatinine 1.21 0.66 - 1.25 mg/dL    GFR Estimate 59 (L) >60 mL/min/1.7m2    GFR Estimate If Black 72 >60 mL/min/1.7m2    Calcium 8.9 8.5 - 10.1 mg/dL   CBC with platelets and differential   Result Value Ref Range    WBC 4.7 4.0 - 11.0 10e9/L    RBC Count 4.29 (L) 4.4 - 5.9 10e12/L    Hemoglobin 11.5 (L) 13.3 - 17.7 g/dL    Hematocrit 39.1 (L) 40.0 - 53.0 %    MCV 91 78 - 100 fl    MCH 26.8 26.5 - 33.0 pg    MCHC 29.4 (L) 31.5 - 36.5 g/dL    RDW 26.6 (H) 10.0 - 15.0 %    Platelet Count 299 150 - 450 10e9/L    Diff Method Automated Method     % Neutrophils 56.6 %    % Lymphocytes 26.4 %    % Monocytes 8.3 %    % Eosinophils 3.2 %    % Basophils 1.7 %    % Immature Granulocytes 3.8 %    Nucleated RBCs 1 (H) 0 /100    Absolute Neutrophil 2.7 1.6 - 8.3 10e9/L    Absolute Lymphocytes 1.2 0.8 - 5.3 10e9/L    Absolute Monocytes 0.4 0.0 - 1.3 10e9/L    Absolute Eosinophils 0.2 0.0 - 0.7 10e9/L    Absolute Basophils 0.1 0.0 - 0.2 10e9/L    Abs Immature Granulocytes 0.2 0 - 0.4 10e9/L    Absolute Nucleated RBC 0.0    Reticulocyte Count   Result Value Ref Range    % Retic 6.6 (H) 0.5 - 2.0 %    Absolute Retic 281.0 (H) 25 - 95 10e9/L       Recent Labs   Lab Test  04/25/17   0859  04/18/17   1256  04/16/17   0322   03/06/17   1459   09/08/16   0736   02/12/15   1845   HGB   --    --   9.3*    --   10.5*   < >  9.7*   < >   --    PLT   --    --   392   --   398   < >  350   < >   --    INR   --    --    --    --    --    --   1.07   --   1.02   NA  143  143  142   < >   --    < >  149*   < >   --    POTASSIUM  4.8  4.3  3.2*   < >   --    < >  3.2*   < >   --    CR  1.39*  1.73*  2.06*   < >   --    < >  1.31*   < >   --     < > = values in this interval not displayed.      EKG: sinus rhythm, no change from the previous EKG(1-2-2017)    IMPRESSION:                                                    Reason for surgery/procedure: knee replacement     The proposed surgical procedure is considered INTERMEDIATE risk.    REVISED CARDIAC RISK INDEX  The patient has the following serious cardiovascular risks for perioperative complications such as (MI, PE, VFib and 3  AV Block):  High risk surgery (>5% cardiac complication risk)  INTERPRETATION: 1 risks: Class II (low risk - 0.9% complication rate)  Pt was seen by cardiologist recently and confirmed cardiac clarification for upcoming surgery     The patient has the following additional risks for perioperative complications:  The 10-year ASCVD risk score (Clifford DC Jr, et al., 2013) is: 16.7%    Values used to calculate the score:      Age: 66 years      Sex: Male      Is Non- : No      Diabetic: No      Tobacco smoker: No      Systolic Blood Pressure: 138 mmHg      Is BP treated: Yes      HDL Cholesterol: 77 mg/dL      Total Cholesterol: 199 mg/dL      ICD-10-CM    1. Preop general physical exam Z01.818 CBC with platelets     Ferritin     Basic metabolic panel  (Ca, Cl, CO2, Creat, Gluc, K, Na, BUN)       RECOMMENDATIONS:                                                      --Consult hospital rounder / IM to assist post-op medical management    Anemia  Anemia and does not require treatment prior to surgery.  Monitor Hemoglobin postoperatively.      --Patient is to take all scheduled medications on the day of surgery EXCEPT for modifications  listed below.    Anticoagulant or Antiplatelet Medication Use  ASPIRIN: Discontinue ASA 7-10 days prior to procedure to reduce bleeding risk.  It should be resumed post-operatively.        ACE Inhibitor or Angiotensin Receptor Blocker (ARB) Use  Ace inhibitor or Angiotensin Receptor Blocker (ARB) and will continue this medication due to the higher risk of uncontrolled perioerative hypertension (e.g. neurosurgical procedure)      APPROVAL GIVEN to proceed with proposed procedure, without further diagnostic evaluation       Signed Electronically by: Xander Lee MD    Copy of this evaluation report is provided to requesting physician.    [place] Preop Guidelines

## 2017-05-19 LAB — COPATH REPORT: NORMAL

## 2017-05-22 NOTE — H&P (VIEW-ONLY)
"Chief Complaint   Patient presents with     Pre-Op Exam     rt total knee 17 @[facility]       Initial /74  Pulse 64  Temp 98.4  F (36.9  C)  Resp 16  Ht 5' 7\" (1.702 m)  Wt 171 lb (77.6 kg)  SpO2 100%  BMI 26.78 kg/m2 Estimated body mass index is 26.78 kg/(m^2) as calculated from the following:    Height as of this encounter: 5' 7\" (1.702 m).    Weight as of this encounter: 171 lb (77.6 kg).  Medication Reconciliation: complete. ANA Moreno LPN      [address] [id number] [phone]  Dept: [phone]    PRE-OP EVALUATION:  Today's date: 2017    Vaibhav Crabtree (: 1950) presents for pre-operative evaluation assessment as requested by Dr. Iyer.  He requires evaluation and anesthesia risk assessment prior to undergoing surgery/procedure for treatment of Rt knee .  Proposed procedure: total replacement    Date of Surgery/ Procedure: 17  Time of Surgery/ Procedure: 8:30a.m.  Hospital/Surgical Facility: [facility]  [phone]  Primary Physician: Xander Lee  Type of Anesthesia Anticipated: to be determined    Patient has a Health Care Directive or Living Will:  YES     1. NO - Do you have a history of heart attack, stroke, stent, bypass or surgery on an artery in the head, neck, heart or legs?  2. NO - Do you ever have any pain or discomfort in your chest?  3. YES - Do you have a history of  Heart Failure?  4. NO - Are you troubled by shortness of breath when: walking on the level, up a slight hill or at night?  5. NO - Do you currently have a cold, bronchitis or other respiratory infection?  6. NO - Do you have a cough, shortness of breath or wheezing?  7. NO - Do you sometimes get pains in the calves of your legs when you walk?  8. YES - Do you or anyone in your family have previous history of blood clots? Patients wife  9. NO - Do you or does anyone in your family have a serious bleeding problem such as prolonged bleeding " following surgeries or cuts?  10. NO - Have you ever had problems with anemia or been told to take iron pills?  11. NO - Have you had any abnormal blood loss such as black, tarry or bloody stools, or abnormal vaginal bleeding?  12. NO - Have you ever had a blood transfusion?  13. NO - Have you or any of your relatives ever had problems with anesthesia?  14. NO - Do you have sleep apnea, excessive snoring or daytime drowsiness?  15. NO - Do you have any prosthetic heart valves?  16. NO - Do you have prosthetic joints?  17. NO - Is there any chance that you may be pregnant?    ANA Moreno LPN      HPI:                                                      See problem list for active medical problems.  Problems all longstanding and stable, except as noted/documented.  See ROS for pertinent symptoms related to these conditions.                                                                                                  .    MEDICAL HISTORY:                                                      Patient Active Problem List    Diagnosis Date Noted     Anemia, unspecified 05/04/2017     Priority: Medium     Intestinal malabsorption, unspecified 05/04/2017     Priority: Medium     Iron adverse reaction 05/04/2017     Priority: Medium     Need for prophylactic measure 05/04/2017     Priority: Medium     Orthostatic hypotension 01/04/2017     Priority: Medium     RAYSHAWN (acute kidney injury) (H) 02/12/2015     Priority: Medium     Claudication of right lower extremity (H)      Priority: Medium     Atypical chest pain 01/26/2015     Priority: Medium     Idiopathic cardiomyopathy (H)      Priority: Medium     EF 30% on 5/2014 echo, EF improved to 50-55% on 1/12/15 echo       Congestive heart failure (H)      Priority: Medium     Hypertension      Priority: Medium     Hyperlipidemia      Priority: Medium     CAD (coronary artery disease)      Priority: Medium     CTa 1/2015- Ca+ score of 237.02, showed 50-70% stenosis in LAD        Mitral regurgitation      Priority: Medium     1+ on 1/2015 echo       Unstable angina (H) 01/11/2015     Priority: Medium     Advanced directives, counseling/discussion 09/10/2012     Priority: Medium     Advance Care Planning:   Receipt of ACP document:  Received: Health Care Directive which was witnessed or notarized on 6/12/13.  Document not previously scanned.  Validation form completed and sent with document to be scanned.  Code Status not addressed on this form.  Confirmed/documented designated decision maker(s). See permanent comments section of demographics in clinical tab. View document(s) and details by clicking on code status.   Added by Nadia Hassan on 6/25/2013                  Arthritis of knee, right 09/10/2012     Priority: Medium     Pain, joint, knee, right 08/22/2012     Priority: Medium     arthritis        CARDIOVASCULAR SCREENING; LDL GOAL LESS THAN 130 10/31/2010     Priority: Medium     Adjustment reaction 06/29/2010     Priority: Medium     Problem list name updated by automated process. Provider to review       Impotence of organic origin 07/09/2008     Priority: Medium     Dyspnea and respiratory abnormality 11/29/2005     Priority: Medium     Problem list name updated by automated process. Provider to review        Past Medical History:   Diagnosis Date     Bite by unspecified animal(E906.5)      CAD (coronary artery disease)     CTa 1/2015- Ca+ score of 237.02, showed 50-70% stenosis in LAD     Claudication of right lower extremity (H)      Congestive heart failure (H)      Hyperlipidemia      Hypertension      Idiopathic cardiomyopathy (H)     EF 30% on 5/2014 echo, EF improved to 50-55% on 1/12/15 echo     Mitral regurgitation     1+ on 1/2015 echo     Osteoarthritis      Past Surgical History:   Procedure Laterality Date     C NONSPECIFIC PROCEDURE  2002    ulnar nerve     C NONSPECIFIC PROCEDURE      right rotator cuff repair     ESOPHAGOSCOPY, GASTROSCOPY, DUODENOSCOPY (EGD),  "COMBINED N/A 1/24/2017    Procedure: COMBINED ESOPHAGOSCOPY, GASTROSCOPY, DUODENOSCOPY (EGD), BIOPSY SINGLE OR MULTIPLE;  Surgeon: Reji Mcghee MD;  Location:  GI     HC REMOVAL OF TONSILS,<11 Y/O      Tonsils <10y.o.     HC REPAIR ROTATOR CUFF,ACUTE  02 and 03    left rotator cuff repair     Current Outpatient Prescriptions   Medication Sig Dispense Refill     lisinopril (PRINIVIL/ZESTRIL) 10 MG tablet Take 0.5 tablets (5 mg) by mouth daily 30 tablet 1     carvedilol (COREG) 25 MG tablet Take 1 tablet (25 mg) by mouth 2 times daily (with meals) (Patient taking differently: Take 25 mg by mouth daily ) 180 tablet 3     aspirin 325 MG tablet Take 325 mg by mouth daily       OTC products: None, except as noted above    Allergies   Allergen Reactions     No Known Allergies       Latex Allergy: NO    Social History   Substance Use Topics     Smoking status: Former Smoker     Packs/day: 4.00     Years: 15.00     Types: Cigarettes     Quit date: 6/20/1971     Smokeless tobacco: Never Used     Alcohol use 0.0 oz/week     0 Standard drinks or equivalent per week      Comment: occ. beer     History   Drug Use No       REVIEW OF SYSTEMS:                                                    C: NEGATIVE for fever, chills, change in weight  E/M: NEGATIVE for ear, mouth and throat problems  R: NEGATIVE for significant cough or SOB  CV: NEGATIVE for chest pain, palpitations or peripheral edema    EXAM:                                                    /74  Pulse 64  Temp 98.4  F (36.9  C)  Resp 16  Ht 5' 7\" (1.702 m)  Wt 171 lb (77.6 kg)  SpO2 100%  BMI 26.78 kg/m2  GENERAL APPEARANCE: healthy, alert and no distress  HENT: ear canals and TM's normal and nose and mouth without ulcers or lesions  RESP: lungs clear to auscultation - no rales, rhonchi or wheezes  CV: regular rate and rhythm, normal S1 S2, no S3 or S4 and no murmur, click or rub   ABDOMEN: soft, nontender, no HSM or masses and bowel sounds " normal  NEURO: Normal strength and tone, sensory exam grossly normal, mentation intact and speech normal    DIAGNOSTICS:                                                      Labs Resulted Today:   Results for orders placed or performed in visit on 05/18/17   Ferritin   Result Value Ref Range    Ferritin 546 (H) 26 - 388 ng/mL   Basic metabolic panel  (Ca, Cl, CO2, Creat, Gluc, K, Na, BUN)   Result Value Ref Range    Sodium 144 133 - 144 mmol/L    Potassium 5.0 3.4 - 5.3 mmol/L    Chloride 111 (H) 94 - 109 mmol/L    Carbon Dioxide 27 20 - 32 mmol/L    Anion Gap 6 3 - 14 mmol/L    Glucose 97 70 - 99 mg/dL    Urea Nitrogen 11 7 - 30 mg/dL    Creatinine 1.21 0.66 - 1.25 mg/dL    GFR Estimate 59 (L) >60 mL/min/1.7m2    GFR Estimate If Black 72 >60 mL/min/1.7m2    Calcium 8.9 8.5 - 10.1 mg/dL   CBC with platelets and differential   Result Value Ref Range    WBC 4.7 4.0 - 11.0 10e9/L    RBC Count 4.29 (L) 4.4 - 5.9 10e12/L    Hemoglobin 11.5 (L) 13.3 - 17.7 g/dL    Hematocrit 39.1 (L) 40.0 - 53.0 %    MCV 91 78 - 100 fl    MCH 26.8 26.5 - 33.0 pg    MCHC 29.4 (L) 31.5 - 36.5 g/dL    RDW 26.6 (H) 10.0 - 15.0 %    Platelet Count 299 150 - 450 10e9/L    Diff Method Automated Method     % Neutrophils 56.6 %    % Lymphocytes 26.4 %    % Monocytes 8.3 %    % Eosinophils 3.2 %    % Basophils 1.7 %    % Immature Granulocytes 3.8 %    Nucleated RBCs 1 (H) 0 /100    Absolute Neutrophil 2.7 1.6 - 8.3 10e9/L    Absolute Lymphocytes 1.2 0.8 - 5.3 10e9/L    Absolute Monocytes 0.4 0.0 - 1.3 10e9/L    Absolute Eosinophils 0.2 0.0 - 0.7 10e9/L    Absolute Basophils 0.1 0.0 - 0.2 10e9/L    Abs Immature Granulocytes 0.2 0 - 0.4 10e9/L    Absolute Nucleated RBC 0.0    Reticulocyte Count   Result Value Ref Range    % Retic 6.6 (H) 0.5 - 2.0 %    Absolute Retic 281.0 (H) 25 - 95 10e9/L       Recent Labs   Lab Test  04/25/17   0859  04/18/17   1256  04/16/17   0322   03/06/17   1459   09/08/16   0736   02/12/15   1845   HGB   --    --   9.3*    --   10.5*   < >  9.7*   < >   --    PLT   --    --   392   --   398   < >  350   < >   --    INR   --    --    --    --    --    --   1.07   --   1.02   NA  143  143  142   < >   --    < >  149*   < >   --    POTASSIUM  4.8  4.3  3.2*   < >   --    < >  3.2*   < >   --    CR  1.39*  1.73*  2.06*   < >   --    < >  1.31*   < >   --     < > = values in this interval not displayed.      EKG: sinus rhythm, no change from the previous EKG(1-2-2017)    IMPRESSION:                                                    Reason for surgery/procedure: knee replacement     The proposed surgical procedure is considered INTERMEDIATE risk.    REVISED CARDIAC RISK INDEX  The patient has the following serious cardiovascular risks for perioperative complications such as (MI, PE, VFib and 3  AV Block):  High risk surgery (>5% cardiac complication risk)  INTERPRETATION: 1 risks: Class II (low risk - 0.9% complication rate)  Pt was seen by cardiologist recently and confirmed cardiac clarification for upcoming surgery     The patient has the following additional risks for perioperative complications:  The 10-year ASCVD risk score (Clifford DC Jr, et al., 2013) is: 16.7%    Values used to calculate the score:      Age: 66 years      Sex: Male      Is Non- : No      Diabetic: No      Tobacco smoker: No      Systolic Blood Pressure: 138 mmHg      Is BP treated: Yes      HDL Cholesterol: 77 mg/dL      Total Cholesterol: 199 mg/dL      ICD-10-CM    1. Preop general physical exam Z01.818 CBC with platelets     Ferritin     Basic metabolic panel  (Ca, Cl, CO2, Creat, Gluc, K, Na, BUN)       RECOMMENDATIONS:                                                      --Consult hospital rounder / IM to assist post-op medical management    Anemia  Anemia and does not require treatment prior to surgery.  Monitor Hemoglobin postoperatively.      --Patient is to take all scheduled medications on the day of surgery EXCEPT for modifications  listed below.    Anticoagulant or Antiplatelet Medication Use  ASPIRIN: Discontinue ASA 7-10 days prior to procedure to reduce bleeding risk.  It should be resumed post-operatively.        ACE Inhibitor or Angiotensin Receptor Blocker (ARB) Use  Ace inhibitor or Angiotensin Receptor Blocker (ARB) and will continue this medication due to the higher risk of uncontrolled perioerative hypertension (e.g. neurosurgical procedure)      APPROVAL GIVEN to proceed with proposed procedure, without further diagnostic evaluation       Signed Electronically by: Xander Lee MD    Copy of this evaluation report is provided to requesting physician.    [facility] Preop Guidelines

## 2017-05-22 NOTE — H&P (VIEW-ONLY)
HPI and Plan:   This is a nice 66-year-old gentleman followed by Dr. Richard Asif for history of idiopathic cardiomyopathy and systolic congestive heart failure diagnosed in early 2014 in referral for preoperative cardiac evaluation.  At that time and a dilated LV with ejection fraction of 25-30%.  A stress study was without evidence of ischemia.  With medical therapy his ejection fraction improved and has been around 50% most recently.  His dyspnea and shortness of breath resolved with that.  He been maintained on diuretics as part of his adenopathy regimen and has had several issues this winter and spring with lots of orthostatic hypotension and acute on chronic renal insufficiency.  Eventually his diuretics were stopped and he states since that time he's not had any further orthostasis.  He states in the last month he has had no shortness of breath, dyspnea on exertion, orthopnea, PND, edema.  He has been without chest discomfort, syncope or near-syncope, palpitations, abdominal distention or bleeding episodes.  He states his blood pressure has been better and still well controlled.  His only limitation is currently on from his right knee pain that needs replacement.  He has a very remote prior tobacco history.  A follow-up stress nuclear study in 2015 again showed no ischemia.  A CT coronary intervention in 2015 showed some very mild left main and mild to moderate LAD disease.  The stress test was done after that and again had no ischemia.      Exam-full exam below.  An summary:  Blood pressure 1:30/70.  Pulse 80 and regular.  Respirations 14 and unlabored.  Lungs-clear  Cardiac-no murmur rub gallop.  JVD or HJR   abdomen-nondistended, no hepatomegaly or bruit  Extremities-no cyanosis or edema  Vascular-normal carotid upstrokes without bruits.  Femoral pulses normal without bruits.  Pedal pulses intact  Full exam below.    Impression/plan  1-history of systolic congestive heart failure, resolved with treatment  of his idiopathic cardiomyopathy.  No evidence for heart failure currently.  On appropriate medications.  Agree probably does not date his diuretic at this point in time as he tends to get prerenal and over diuresed with fast.    2-idiopathic cardiomyopathy.  Now low-normal ejection fraction.  No arrhythmia or heart failure findings or symptoms.  No evidence of volume overload.  Doing well on current regimen.    3-mild coronary artery disease by CTA.  Currently without ischemic symptoms.  I do think he merits treatment with a statin and this should be considered in the long run, it is not mandatory prior to surgery but starting at before then would be reasonable.  I would use intense statin dosing.  Most recent LDL that I have access to is the year and half old and was 96.    4-chronic renal insufficiency, stage III.  Most recent BMP shows stable function without acute exacerbation a couple of weeks ago, at his current volume status.    5-chronic anemia.  Recent hemoglobin was 9.3 which is down from around 11 at the beginning of this year.  Normocytic and hypochromic.  In looking back it's been this range going back about 6 months.  However one year ago it was 14.2.  Do not think his renal dysfunction is enough to account for this anemia.  I have recommended further follow-up with Dr. Lee    At this time this gentleman is very stable from a cardiovascular standpoint.  He has no heart failure, arrhythmia, or ischemic symptoms and findings.  He has no valvular disease and no recent acute coronary syndromes.  He has a low normal ejection fraction.  He is acceptable to proceed with his knee replacement on his current regimen at this time.  As above I recommend statin therapy for the long run.      No orders of the defined types were placed in this encounter.      No orders of the defined types were placed in this encounter.      Medications Discontinued During This Encounter   Medication Reason     furosemide (LASIX)  20 MG tablet Therapy completed         Encounter Diagnosis   Name Primary?     Idiopathic cardiomyopathy (H) Yes       CURRENT MEDICATIONS:  Current Outpatient Prescriptions   Medication Sig Dispense Refill     lisinopril (PRINIVIL/ZESTRIL) 10 MG tablet Take 0.5 tablets (5 mg) by mouth daily 30 tablet 1     omeprazole (PRILOSEC) 20 MG CR capsule Take 1 capsule (20 mg) by mouth daily 30 capsule 0     carvedilol (COREG) 25 MG tablet Take 1 tablet (25 mg) by mouth 2 times daily (with meals) (Patient taking differently: Take 25 mg by mouth daily ) 180 tablet 3     aspirin 325 MG tablet Take 325 mg by mouth daily         ALLERGIES     Allergies   Allergen Reactions     No Known Allergies        PAST MEDICAL HISTORY:  Past Medical History:   Diagnosis Date     Bite by unspecified animal(E906.5)      CAD (coronary artery disease)     CTa 1/2015- Ca+ score of 237.02, showed 50-70% stenosis in LAD     Claudication of right lower extremity (H)      Congestive heart failure (H)      Hyperlipidemia      Hypertension      Idiopathic cardiomyopathy (H)     EF 30% on 5/2014 echo, EF improved to 50-55% on 1/12/15 echo     Mitral regurgitation     1+ on 1/2015 echo     Osteoarthritis        PAST SURGICAL HISTORY:  Past Surgical History:   Procedure Laterality Date     C NONSPECIFIC PROCEDURE  2002    ulnar nerve     C NONSPECIFIC PROCEDURE      right rotator cuff repair     ESOPHAGOSCOPY, GASTROSCOPY, DUODENOSCOPY (EGD), COMBINED N/A 1/24/2017    Procedure: COMBINED ESOPHAGOSCOPY, GASTROSCOPY, DUODENOSCOPY (EGD), BIOPSY SINGLE OR MULTIPLE;  Surgeon: Reji Mcghee MD;  Location:  GI     HC REMOVAL OF TONSILS,<9 Y/O      Tonsils <10y.o.     HC REPAIR ROTATOR CUFF,ACUTE  02 and 03    left rotator cuff repair       FAMILY HISTORY:  Family History   Problem Relation Age of Onset     Respiratory Father      COPD     Unknown/Adopted Mother      CANCER Brother      CANCER Brother      Connective Tissue Disorder Paternal Grandfather   "    Depression Daughter      Eye Disorder Son        SOCIAL HISTORY:  Social History     Social History     Marital status:      Spouse name: lucian     Number of children: 2     Years of education: 9     Occupational History     [employer]       [employer]     Social History Main Topics     Smoking status: Former Smoker     Packs/day: 4.00     Years: 15.00     Types: Cigarettes     Quit date: 6/20/1971     Smokeless tobacco: Never Used     Alcohol use 0.0 oz/week     0 Standard drinks or equivalent per week      Comment: occ. beer     Drug use: No     Sexual activity: Yes     Partners: Female     Other Topics Concern      Service No     Blood Transfusions No     Caffeine Concern No     Occupational Exposure No     Hobby Hazards No     Sleep Concern Yes     Waking with hot sweats     Stress Concern No     Weight Concern No     Special Diet No     Back Care No     Exercise Yes     Walks all day     Bike Helmet No     Seat Belt Yes     Self-Exams No     Social History Narrative       Review of Systems:  Skin:  Negative       Eyes:  Positive for glasses    ENT:  Negative      Respiratory:  Negative       Cardiovascular:  Negative Positive for;fatigue;exercise intolerance    Gastroenterology: Negative      Genitourinary:  Negative      Musculoskeletal:  Positive for joint pain    Neurologic:  Negative      Psychiatric:  Negative      Heme/Lymph/Imm:  Negative      Endocrine:  Negative        Physical Exam:  Vitals: /70  Pulse 83  Ht 1.702 m (5' 7\")  Wt 78 kg (172 lb)  BMI 26.94 kg/m2    Constitutional:  cooperative;alert and oriented;well developed chronically ill      Skin:  warm and dry to the touch        Head:  normocephalic, no masses or lesions        Eyes:  pupils equal and round;sclera white;EOMS intact        ENT:  no pallor or cyanosis        Neck:  carotid pulses are full and equal bilaterally;JVP normal;no carotid bruit (no HJR)        Chest:  clear to auscultation;normal respiratory " excursion          Cardiac: regular rhythm;no murmurs, gallops or rubs detected;normal S1 and S2;no S3 or S4                  Abdomen:  abdomen soft;no masses;non-tender;no bruits        Vascular: pulses full and equal, no bruits auscultated                                        Extremities and Back:  no deformities, clubbing, cyanosis, erythema observed;no edema stasis pigmentation trace          Neurological:  affect appropriate, oriented to time, person and place;no gross motor deficits              CC  Xander Lee MD  [address] [address]

## 2017-05-24 ENCOUNTER — ANESTHESIA EVENT (OUTPATIENT)
Dept: SURGERY | Facility: CLINIC | Age: 67
DRG: 470 | End: 2017-05-24
Payer: COMMERCIAL

## 2017-05-24 ENCOUNTER — APPOINTMENT (OUTPATIENT)
Dept: PHYSICAL THERAPY | Facility: CLINIC | Age: 67
DRG: 470 | End: 2017-05-24
Attending: ORTHOPAEDIC SURGERY
Payer: COMMERCIAL

## 2017-05-24 ENCOUNTER — APPOINTMENT (OUTPATIENT)
Dept: GENERAL RADIOLOGY | Facility: CLINIC | Age: 67
DRG: 470 | End: 2017-05-24
Attending: ORTHOPAEDIC SURGERY
Payer: COMMERCIAL

## 2017-05-24 ENCOUNTER — ANESTHESIA (OUTPATIENT)
Dept: SURGERY | Facility: CLINIC | Age: 67
DRG: 470 | End: 2017-05-24
Payer: COMMERCIAL

## 2017-05-24 ENCOUNTER — HOSPITAL ENCOUNTER (INPATIENT)
Facility: CLINIC | Age: 67
LOS: 5 days | Discharge: HOME HEALTH | DRG: 470 | End: 2017-05-29
Attending: ORTHOPAEDIC SURGERY | Admitting: ORTHOPAEDIC SURGERY
Payer: COMMERCIAL

## 2017-05-24 DIAGNOSIS — Z96.651 S/P TKR (TOTAL KNEE REPLACEMENT) NOT USING CEMENT, RIGHT: Primary | ICD-10-CM

## 2017-05-24 DIAGNOSIS — K22.10 ULCER OF ESOPHAGUS WITHOUT BLEEDING: ICD-10-CM

## 2017-05-24 DIAGNOSIS — I82.4Z1 ACUTE VENOUS EMBOLISM AND THROMBOSIS OF DEEP VESSELS OF DISTAL LOWER EXTREMITY, RIGHT (H): ICD-10-CM

## 2017-05-24 LAB
ABO + RH BLD: NORMAL
ABO + RH BLD: NORMAL
BLD GP AB SCN SERPL QL: NORMAL
BLOOD BANK CMNT PATIENT-IMP: NORMAL
CREAT SERPL-MCNC: 1.32 MG/DL (ref 0.66–1.25)
GFR SERPL CREATININE-BSD FRML MDRD: 54 ML/MIN/1.7M2
HGB BLD-MCNC: 12.4 G/DL (ref 13.3–17.7)
INR PPP: 0.97 (ref 0.86–1.14)
PLATELET # BLD AUTO: 467 10E9/L (ref 150–450)
POTASSIUM SERPL-SCNC: 4.6 MMOL/L (ref 3.4–5.3)
SPECIMEN EXP DATE BLD: NORMAL

## 2017-05-24 PROCEDURE — S0020 INJECTION, BUPIVICAINE HYDRO: HCPCS | Performed by: ORTHOPAEDIC SURGERY

## 2017-05-24 PROCEDURE — 27810169 ZZH OR IMPLANT GENERAL: Performed by: ORTHOPAEDIC SURGERY

## 2017-05-24 PROCEDURE — 85018 HEMOGLOBIN: CPT | Performed by: ORTHOPAEDIC SURGERY

## 2017-05-24 PROCEDURE — 12000007 ZZH R&B INTERMEDIATE

## 2017-05-24 PROCEDURE — 97530 THERAPEUTIC ACTIVITIES: CPT | Mod: GP | Performed by: PHYSICAL THERAPIST

## 2017-05-24 PROCEDURE — 84132 ASSAY OF SERUM POTASSIUM: CPT | Performed by: ORTHOPAEDIC SURGERY

## 2017-05-24 PROCEDURE — 25000128 H RX IP 250 OP 636: Performed by: ORTHOPAEDIC SURGERY

## 2017-05-24 PROCEDURE — 99207 ZZC CONSULT E&M CHANGED TO INITIAL LEVEL: CPT | Performed by: PHYSICIAN ASSISTANT

## 2017-05-24 PROCEDURE — 86901 BLOOD TYPING SEROLOGIC RH(D): CPT | Performed by: ORTHOPAEDIC SURGERY

## 2017-05-24 PROCEDURE — 25000125 ZZHC RX 250: Performed by: ANESTHESIOLOGY

## 2017-05-24 PROCEDURE — 85610 PROTHROMBIN TIME: CPT | Performed by: ORTHOPAEDIC SURGERY

## 2017-05-24 PROCEDURE — C1776 JOINT DEVICE (IMPLANTABLE): HCPCS | Performed by: ORTHOPAEDIC SURGERY

## 2017-05-24 PROCEDURE — 40000193 ZZH STATISTIC PT WARD VISIT: Performed by: PHYSICAL THERAPIST

## 2017-05-24 PROCEDURE — 37000009 ZZH ANESTHESIA TECHNICAL FEE, EACH ADDTL 15 MIN: Performed by: ORTHOPAEDIC SURGERY

## 2017-05-24 PROCEDURE — 86850 RBC ANTIBODY SCREEN: CPT | Performed by: ORTHOPAEDIC SURGERY

## 2017-05-24 PROCEDURE — 27210995 ZZH RX 272: Performed by: ORTHOPAEDIC SURGERY

## 2017-05-24 PROCEDURE — 97116 GAIT TRAINING THERAPY: CPT | Mod: GP | Performed by: PHYSICAL THERAPIST

## 2017-05-24 PROCEDURE — 40000986 XR KNEE PORT RT 1/2 VW: Mod: RT

## 2017-05-24 PROCEDURE — 86900 BLOOD TYPING SEROLOGIC ABO: CPT | Performed by: ORTHOPAEDIC SURGERY

## 2017-05-24 PROCEDURE — 97161 PT EVAL LOW COMPLEX 20 MIN: CPT | Mod: GP | Performed by: PHYSICAL THERAPIST

## 2017-05-24 PROCEDURE — 36415 COLL VENOUS BLD VENIPUNCTURE: CPT | Performed by: ORTHOPAEDIC SURGERY

## 2017-05-24 PROCEDURE — 82565 ASSAY OF CREATININE: CPT | Performed by: ORTHOPAEDIC SURGERY

## 2017-05-24 PROCEDURE — 97110 THERAPEUTIC EXERCISES: CPT | Mod: GP | Performed by: PHYSICAL THERAPIST

## 2017-05-24 PROCEDURE — 27110028 ZZH OR GENERAL SUPPLY NON-STERILE: Performed by: ORTHOPAEDIC SURGERY

## 2017-05-24 PROCEDURE — 25000128 H RX IP 250 OP 636: Performed by: ANESTHESIOLOGY

## 2017-05-24 PROCEDURE — 25000125 ZZHC RX 250: Performed by: NURSE ANESTHETIST, CERTIFIED REGISTERED

## 2017-05-24 PROCEDURE — 0SRC0J9 REPLACEMENT OF RIGHT KNEE JOINT WITH SYNTHETIC SUBSTITUTE, CEMENTED, OPEN APPROACH: ICD-10-PCS | Performed by: ORTHOPAEDIC SURGERY

## 2017-05-24 PROCEDURE — 27210794 ZZH OR GENERAL SUPPLY STERILE: Performed by: ORTHOPAEDIC SURGERY

## 2017-05-24 PROCEDURE — 36000093 ZZH SURGERY LEVEL 4 1ST 30 MIN: Performed by: ORTHOPAEDIC SURGERY

## 2017-05-24 PROCEDURE — 25800025 ZZH RX 258: Performed by: ORTHOPAEDIC SURGERY

## 2017-05-24 PROCEDURE — 71000012 ZZH RECOVERY PHASE 1 LEVEL 1 FIRST HR: Performed by: ORTHOPAEDIC SURGERY

## 2017-05-24 PROCEDURE — 25000128 H RX IP 250 OP 636: Performed by: NURSE ANESTHETIST, CERTIFIED REGISTERED

## 2017-05-24 PROCEDURE — 40000171 ZZH STATISTIC PRE-PROCEDURE ASSESSMENT III: Performed by: ORTHOPAEDIC SURGERY

## 2017-05-24 PROCEDURE — 85049 AUTOMATED PLATELET COUNT: CPT | Performed by: ORTHOPAEDIC SURGERY

## 2017-05-24 PROCEDURE — 25000132 ZZH RX MED GY IP 250 OP 250 PS 637: Performed by: PHYSICIAN ASSISTANT

## 2017-05-24 PROCEDURE — 25000132 ZZH RX MED GY IP 250 OP 250 PS 637: Performed by: ORTHOPAEDIC SURGERY

## 2017-05-24 PROCEDURE — 25000125 ZZHC RX 250: Performed by: ORTHOPAEDIC SURGERY

## 2017-05-24 PROCEDURE — 99222 1ST HOSP IP/OBS MODERATE 55: CPT | Mod: 25 | Performed by: PHYSICIAN ASSISTANT

## 2017-05-24 PROCEDURE — 36000063 ZZH SURGERY LEVEL 4 EA 15 ADDTL MIN: Performed by: ORTHOPAEDIC SURGERY

## 2017-05-24 PROCEDURE — 37000008 ZZH ANESTHESIA TECHNICAL FEE, 1ST 30 MIN: Performed by: ORTHOPAEDIC SURGERY

## 2017-05-24 DEVICE — IMP COMP TIBIAL KNEE ASCENT 75MM 141224: Type: IMPLANTABLE DEVICE | Site: KNEE | Status: FUNCTIONAL

## 2017-05-24 DEVICE — IMP INSERT TIBIAL BIOM PS PLUS BEARING 16X71/75MM 183746: Type: IMPLANTABLE DEVICE | Site: KNEE | Status: FUNCTIONAL

## 2017-05-24 DEVICE — IMP COMP PATELLA BIOM 3 PEG 37MM STD 184768: Type: IMPLANTABLE DEVICE | Site: KNEE | Status: FUNCTIONAL

## 2017-05-24 DEVICE — BONE CEMENT PALACOS 00-1112-140-01: Type: IMPLANTABLE DEVICE | Site: KNEE | Status: FUNCTIONAL

## 2017-05-24 DEVICE — IMP COMP FEMORAL VANGARD BIOM PS RT 65MM 183108: Type: IMPLANTABLE DEVICE | Site: KNEE | Status: FUNCTIONAL

## 2017-05-24 DEVICE — BONE CEMENT PALACOS W/GENTAMICIN 00-1113-140-01: Type: IMPLANTABLE DEVICE | Site: KNEE | Status: FUNCTIONAL

## 2017-05-24 DEVICE — IMP COMP FEMORAL VANGARD BIOM FIXATION 183099: Type: IMPLANTABLE DEVICE | Site: KNEE | Status: FUNCTIONAL

## 2017-05-24 RX ORDER — PROCHLORPERAZINE 25 MG
12.5 SUPPOSITORY, RECTAL RECTAL EVERY 12 HOURS PRN
Status: DISCONTINUED | OUTPATIENT
Start: 2017-05-24 | End: 2017-05-29 | Stop reason: HOSPADM

## 2017-05-24 RX ORDER — ONDANSETRON 2 MG/ML
4 INJECTION INTRAMUSCULAR; INTRAVENOUS EVERY 30 MIN PRN
Status: DISCONTINUED | OUTPATIENT
Start: 2017-05-24 | End: 2017-05-24 | Stop reason: HOSPADM

## 2017-05-24 RX ORDER — LIDOCAINE 40 MG/G
CREAM TOPICAL
Status: DISCONTINUED | OUTPATIENT
Start: 2017-05-24 | End: 2017-05-29 | Stop reason: HOSPADM

## 2017-05-24 RX ORDER — OXYCODONE HCL 10 MG/1
10 TABLET, FILM COATED, EXTENDED RELEASE ORAL ONCE
Status: COMPLETED | OUTPATIENT
Start: 2017-05-24 | End: 2017-05-24

## 2017-05-24 RX ORDER — PROPOFOL 10 MG/ML
INJECTION, EMULSION INTRAVENOUS CONTINUOUS PRN
Status: DISCONTINUED | OUTPATIENT
Start: 2017-05-24 | End: 2017-05-24

## 2017-05-24 RX ORDER — HYDRALAZINE HYDROCHLORIDE 20 MG/ML
2.5-5 INJECTION INTRAMUSCULAR; INTRAVENOUS EVERY 10 MIN PRN
Status: DISCONTINUED | OUTPATIENT
Start: 2017-05-24 | End: 2017-05-24 | Stop reason: HOSPADM

## 2017-05-24 RX ORDER — PROCHLORPERAZINE MALEATE 5 MG
5 TABLET ORAL EVERY 6 HOURS PRN
Status: DISCONTINUED | OUTPATIENT
Start: 2017-05-24 | End: 2017-05-29 | Stop reason: HOSPADM

## 2017-05-24 RX ORDER — SODIUM CHLORIDE 9 MG/ML
INJECTION, SOLUTION INTRAVENOUS CONTINUOUS
Status: DISCONTINUED | OUTPATIENT
Start: 2017-05-24 | End: 2017-05-26

## 2017-05-24 RX ORDER — HYDROMORPHONE HYDROCHLORIDE 1 MG/ML
.3-.5 INJECTION, SOLUTION INTRAMUSCULAR; INTRAVENOUS; SUBCUTANEOUS
Status: DISCONTINUED | OUTPATIENT
Start: 2017-05-24 | End: 2017-05-25

## 2017-05-24 RX ORDER — CYCLOBENZAPRINE HCL 5 MG
5 TABLET ORAL 3 TIMES DAILY PRN
Status: DISCONTINUED | OUTPATIENT
Start: 2017-05-24 | End: 2017-05-29 | Stop reason: HOSPADM

## 2017-05-24 RX ORDER — HYDROXYZINE HYDROCHLORIDE 10 MG/1
10 TABLET, FILM COATED ORAL EVERY 6 HOURS PRN
Status: DISCONTINUED | OUTPATIENT
Start: 2017-05-24 | End: 2017-05-29 | Stop reason: HOSPADM

## 2017-05-24 RX ORDER — BUPIVACAINE HYDROCHLORIDE AND EPINEPHRINE 2.5; 5 MG/ML; UG/ML
INJECTION, SOLUTION EPIDURAL; INFILTRATION; INTRACAUDAL; PERINEURAL PRN
Status: DISCONTINUED | OUTPATIENT
Start: 2017-05-24 | End: 2017-05-24 | Stop reason: HOSPADM

## 2017-05-24 RX ORDER — ONDANSETRON 2 MG/ML
4 INJECTION INTRAMUSCULAR; INTRAVENOUS EVERY 6 HOURS PRN
Status: DISCONTINUED | OUTPATIENT
Start: 2017-05-24 | End: 2017-05-29 | Stop reason: HOSPADM

## 2017-05-24 RX ORDER — BUPIVACAINE HYDROCHLORIDE 7.5 MG/ML
INJECTION, SOLUTION INTRASPINAL PRN
Status: DISCONTINUED | OUTPATIENT
Start: 2017-05-24 | End: 2017-05-24

## 2017-05-24 RX ORDER — CARVEDILOL 25 MG/1
25 TABLET ORAL 2 TIMES DAILY
Status: DISCONTINUED | OUTPATIENT
Start: 2017-05-24 | End: 2017-05-29 | Stop reason: HOSPADM

## 2017-05-24 RX ORDER — AMOXICILLIN 250 MG
1-2 CAPSULE ORAL 2 TIMES DAILY
Status: DISCONTINUED | OUTPATIENT
Start: 2017-05-24 | End: 2017-05-29 | Stop reason: HOSPADM

## 2017-05-24 RX ORDER — LISINOPRIL 10 MG/1
10 TABLET ORAL DAILY
Status: DISCONTINUED | OUTPATIENT
Start: 2017-05-24 | End: 2017-05-24

## 2017-05-24 RX ORDER — ONDANSETRON 4 MG/1
4 TABLET, ORALLY DISINTEGRATING ORAL EVERY 30 MIN PRN
Status: DISCONTINUED | OUTPATIENT
Start: 2017-05-24 | End: 2017-05-24 | Stop reason: HOSPADM

## 2017-05-24 RX ORDER — FUROSEMIDE 20 MG
20 TABLET ORAL DAILY
Status: DISCONTINUED | OUTPATIENT
Start: 2017-05-24 | End: 2017-05-24

## 2017-05-24 RX ORDER — FENTANYL CITRATE 50 UG/ML
25-100 INJECTION, SOLUTION INTRAMUSCULAR; INTRAVENOUS
Status: COMPLETED | OUTPATIENT
Start: 2017-05-24 | End: 2017-05-24

## 2017-05-24 RX ORDER — ACETAMINOPHEN 325 MG/1
650 TABLET ORAL EVERY 4 HOURS PRN
Status: DISCONTINUED | OUTPATIENT
Start: 2017-05-27 | End: 2017-05-29 | Stop reason: HOSPADM

## 2017-05-24 RX ORDER — HYDRALAZINE HYDROCHLORIDE 20 MG/ML
10 INJECTION INTRAMUSCULAR; INTRAVENOUS EVERY 4 HOURS PRN
Status: DISCONTINUED | OUTPATIENT
Start: 2017-05-24 | End: 2017-05-29 | Stop reason: HOSPADM

## 2017-05-24 RX ORDER — MAGNESIUM HYDROXIDE 1200 MG/15ML
LIQUID ORAL PRN
Status: DISCONTINUED | OUTPATIENT
Start: 2017-05-24 | End: 2017-05-24 | Stop reason: HOSPADM

## 2017-05-24 RX ORDER — METOCLOPRAMIDE HYDROCHLORIDE 5 MG/ML
5 INJECTION INTRAMUSCULAR; INTRAVENOUS EVERY 6 HOURS PRN
Status: DISCONTINUED | OUTPATIENT
Start: 2017-05-24 | End: 2017-05-29 | Stop reason: HOSPADM

## 2017-05-24 RX ORDER — ACETAMINOPHEN 325 MG/1
975 TABLET ORAL EVERY 8 HOURS
Status: DISPENSED | OUTPATIENT
Start: 2017-05-24 | End: 2017-05-27

## 2017-05-24 RX ORDER — METOCLOPRAMIDE 5 MG/1
5 TABLET ORAL EVERY 6 HOURS PRN
Status: DISCONTINUED | OUTPATIENT
Start: 2017-05-24 | End: 2017-05-29 | Stop reason: HOSPADM

## 2017-05-24 RX ORDER — ONDANSETRON 2 MG/ML
INJECTION INTRAMUSCULAR; INTRAVENOUS PRN
Status: DISCONTINUED | OUTPATIENT
Start: 2017-05-24 | End: 2017-05-24

## 2017-05-24 RX ORDER — NALOXONE HYDROCHLORIDE 0.4 MG/ML
.1-.4 INJECTION, SOLUTION INTRAMUSCULAR; INTRAVENOUS; SUBCUTANEOUS
Status: DISCONTINUED | OUTPATIENT
Start: 2017-05-24 | End: 2017-05-29 | Stop reason: HOSPADM

## 2017-05-24 RX ORDER — LABETALOL HYDROCHLORIDE 5 MG/ML
10 INJECTION, SOLUTION INTRAVENOUS
Status: DISCONTINUED | OUTPATIENT
Start: 2017-05-24 | End: 2017-05-24 | Stop reason: HOSPADM

## 2017-05-24 RX ORDER — CEFAZOLIN SODIUM 1 G/3ML
1 INJECTION, POWDER, FOR SOLUTION INTRAMUSCULAR; INTRAVENOUS EVERY 8 HOURS
Status: COMPLETED | OUTPATIENT
Start: 2017-05-24 | End: 2017-05-25

## 2017-05-24 RX ORDER — CEFAZOLIN SODIUM 1 G/3ML
1 INJECTION, POWDER, FOR SOLUTION INTRAMUSCULAR; INTRAVENOUS SEE ADMIN INSTRUCTIONS
Status: DISCONTINUED | OUTPATIENT
Start: 2017-05-24 | End: 2017-05-24 | Stop reason: HOSPADM

## 2017-05-24 RX ORDER — SODIUM CHLORIDE, SODIUM LACTATE, POTASSIUM CHLORIDE, CALCIUM CHLORIDE 600; 310; 30; 20 MG/100ML; MG/100ML; MG/100ML; MG/100ML
INJECTION, SOLUTION INTRAVENOUS CONTINUOUS
Status: DISCONTINUED | OUTPATIENT
Start: 2017-05-24 | End: 2017-05-24 | Stop reason: HOSPADM

## 2017-05-24 RX ORDER — ONDANSETRON 4 MG/1
4 TABLET, ORALLY DISINTEGRATING ORAL EVERY 6 HOURS PRN
Status: DISCONTINUED | OUTPATIENT
Start: 2017-05-24 | End: 2017-05-29 | Stop reason: HOSPADM

## 2017-05-24 RX ORDER — FENTANYL CITRATE 0.05 MG/ML
25-50 INJECTION, SOLUTION INTRAMUSCULAR; INTRAVENOUS
Status: DISCONTINUED | OUTPATIENT
Start: 2017-05-24 | End: 2017-05-24 | Stop reason: HOSPADM

## 2017-05-24 RX ORDER — BUPIVACAINE HYDROCHLORIDE 2.5 MG/ML
INJECTION, SOLUTION EPIDURAL; INFILTRATION; INTRACAUDAL PRN
Status: DISCONTINUED | OUTPATIENT
Start: 2017-05-24 | End: 2017-05-24 | Stop reason: HOSPADM

## 2017-05-24 RX ORDER — EPHEDRINE SULFATE 50 MG/ML
INJECTION, SOLUTION INTRAMUSCULAR; INTRAVENOUS; SUBCUTANEOUS PRN
Status: DISCONTINUED | OUTPATIENT
Start: 2017-05-24 | End: 2017-05-24

## 2017-05-24 RX ORDER — CEFAZOLIN SODIUM 2 G/100ML
2 INJECTION, SOLUTION INTRAVENOUS
Status: COMPLETED | OUTPATIENT
Start: 2017-05-24 | End: 2017-05-24

## 2017-05-24 RX ORDER — OXYCODONE HYDROCHLORIDE 5 MG/1
5-10 TABLET ORAL EVERY 4 HOURS PRN
Status: DISCONTINUED | OUTPATIENT
Start: 2017-05-24 | End: 2017-05-25

## 2017-05-24 RX ADMIN — ROPIVACAINE HYDROCHLORIDE 20 ML GIVEN: 5 INJECTION, SOLUTION EPIDURAL; INFILTRATION; PERINEURAL at 09:36

## 2017-05-24 RX ADMIN — PHENYLEPHRINE HYDROCHLORIDE 100 MCG: 10 INJECTION, SOLUTION INTRAMUSCULAR; INTRAVENOUS; SUBCUTANEOUS at 13:20

## 2017-05-24 RX ADMIN — SODIUM CHLORIDE, POTASSIUM CHLORIDE, SODIUM LACTATE AND CALCIUM CHLORIDE: 600; 310; 30; 20 INJECTION, SOLUTION INTRAVENOUS at 11:56

## 2017-05-24 RX ADMIN — MIDAZOLAM HYDROCHLORIDE 1 MG: 1 INJECTION, SOLUTION INTRAMUSCULAR; INTRAVENOUS at 09:35

## 2017-05-24 RX ADMIN — Medication 5 MG: at 11:58

## 2017-05-24 RX ADMIN — PROCHLORPERAZINE EDISYLATE 5 MG: 5 INJECTION INTRAMUSCULAR; INTRAVENOUS at 20:04

## 2017-05-24 RX ADMIN — PHENYLEPHRINE HYDROCHLORIDE 100 MCG: 10 INJECTION, SOLUTION INTRAMUSCULAR; INTRAVENOUS; SUBCUTANEOUS at 12:06

## 2017-05-24 RX ADMIN — OMEPRAZOLE 20 MG: 20 CAPSULE, DELAYED RELEASE ORAL at 16:50

## 2017-05-24 RX ADMIN — Medication 5 MG: at 12:50

## 2017-05-24 RX ADMIN — ONDANSETRON 4 MG: 2 INJECTION INTRAMUSCULAR; INTRAVENOUS at 11:45

## 2017-05-24 RX ADMIN — ONDANSETRON 4 MG: 4 TABLET, ORALLY DISINTEGRATING ORAL at 17:01

## 2017-05-24 RX ADMIN — MIDAZOLAM HYDROCHLORIDE 2 MG: 1 INJECTION, SOLUTION INTRAMUSCULAR; INTRAVENOUS at 11:40

## 2017-05-24 RX ADMIN — BUPIVACAINE HYDROCHLORIDE IN DEXTROSE 12.75 MG: 7.5 INJECTION, SOLUTION SUBARACHNOID at 11:30

## 2017-05-24 RX ADMIN — PHENYLEPHRINE HYDROCHLORIDE 100 MCG: 10 INJECTION, SOLUTION INTRAMUSCULAR; INTRAVENOUS; SUBCUTANEOUS at 12:50

## 2017-05-24 RX ADMIN — Medication 5 MG: at 12:19

## 2017-05-24 RX ADMIN — FENTANYL CITRATE 25 MCG: 50 INJECTION, SOLUTION INTRAMUSCULAR; INTRAVENOUS at 12:40

## 2017-05-24 RX ADMIN — Medication 5 MG: at 12:03

## 2017-05-24 RX ADMIN — SODIUM CHLORIDE, POTASSIUM CHLORIDE, SODIUM LACTATE AND CALCIUM CHLORIDE: 600; 310; 30; 20 INJECTION, SOLUTION INTRAVENOUS at 09:14

## 2017-05-24 RX ADMIN — HYDROMORPHONE HYDROCHLORIDE 0.5 MG: 1 INJECTION, SOLUTION INTRAMUSCULAR; INTRAVENOUS; SUBCUTANEOUS at 18:43

## 2017-05-24 RX ADMIN — FENTANYL CITRATE 50 MCG: 50 INJECTION, SOLUTION INTRAMUSCULAR; INTRAVENOUS at 09:34

## 2017-05-24 RX ADMIN — PHENYLEPHRINE HYDROCHLORIDE 50 MCG: 10 INJECTION, SOLUTION INTRAMUSCULAR; INTRAVENOUS; SUBCUTANEOUS at 11:58

## 2017-05-24 RX ADMIN — HYDROMORPHONE HYDROCHLORIDE 0.5 MG: 1 INJECTION, SOLUTION INTRAMUSCULAR; INTRAVENOUS; SUBCUTANEOUS at 13:54

## 2017-05-24 RX ADMIN — FENTANYL CITRATE 50 MCG: 50 INJECTION, SOLUTION INTRAMUSCULAR; INTRAVENOUS at 11:51

## 2017-05-24 RX ADMIN — CARVEDILOL 25 MG: 25 TABLET, FILM COATED ORAL at 20:00

## 2017-05-24 RX ADMIN — FENTANYL CITRATE 25 MCG: 50 INJECTION, SOLUTION INTRAMUSCULAR; INTRAVENOUS at 12:31

## 2017-05-24 RX ADMIN — CEFAZOLIN SODIUM 2 G: 2 INJECTION, SOLUTION INTRAVENOUS at 11:50

## 2017-05-24 RX ADMIN — SODIUM CHLORIDE, POTASSIUM CHLORIDE, SODIUM LACTATE AND CALCIUM CHLORIDE: 600; 310; 30; 20 INJECTION, SOLUTION INTRAVENOUS at 13:19

## 2017-05-24 RX ADMIN — PROPOFOL 100 MCG/KG/MIN: 10 INJECTION, EMULSION INTRAVENOUS at 11:50

## 2017-05-24 RX ADMIN — PHENYLEPHRINE HYDROCHLORIDE 50 MCG: 10 INJECTION, SOLUTION INTRAMUSCULAR; INTRAVENOUS; SUBCUTANEOUS at 12:03

## 2017-05-24 RX ADMIN — PHENYLEPHRINE HYDROCHLORIDE 100 MCG: 10 INJECTION, SOLUTION INTRAMUSCULAR; INTRAVENOUS; SUBCUTANEOUS at 12:19

## 2017-05-24 RX ADMIN — CEFAZOLIN SODIUM 1 G: 1 INJECTION, POWDER, FOR SOLUTION INTRAMUSCULAR; INTRAVENOUS at 19:46

## 2017-05-24 RX ADMIN — ACETAMINOPHEN 975 MG: 325 TABLET, FILM COATED ORAL at 16:50

## 2017-05-24 RX ADMIN — Medication 5 MG: at 12:06

## 2017-05-24 RX ADMIN — OXYCODONE HYDROCHLORIDE 10 MG: 10 TABLET, FILM COATED, EXTENDED RELEASE ORAL at 09:14

## 2017-05-24 RX ADMIN — SODIUM CHLORIDE: 9 INJECTION, SOLUTION INTRAVENOUS at 16:51

## 2017-05-24 RX ADMIN — CYCLOBENZAPRINE HYDROCHLORIDE 5 MG: 5 TABLET, FILM COATED ORAL at 20:00

## 2017-05-24 ASSESSMENT — ACTIVITIES OF DAILY LIVING (ADL): FALL_HISTORY_WITHIN_LAST_SIX_MONTHS: NO

## 2017-05-24 ASSESSMENT — LIFESTYLE VARIABLES: TOBACCO_USE: 1

## 2017-05-24 NOTE — PROGRESS NOTES
Admission medication history interview status for the 5/24/2017  admission is complete. See EPIC admission navigator for prior to admission medications     Medication history source reliability:Good    Medication history interview source(s):Patient    Medication history resources (including written lists, pill bottles, clinic record):MAIL    Primary pharmacy.[facility]    Additional medication history information not noted on PTA med list :None    Time spent in this activity: 45 minutes    Prior to Admission medications    Medication Sig Last Dose Taking? Auth Provider   ASPIRIN PO Take 325 mg by mouth daily 5/18/2017 at AM Yes Reported, Patient   FUROSEMIDE PO Take 20 mg by mouth daily 5/18/2017 at AM Yes Reported, Patient   Omeprazole (PRILOSEC PO) Take 20 mg by mouth daily 5/18/2017 at AM Yes Reported, Patient   Carvedilol (COREG PO) Take 25 mg by mouth 2 times daily  5/24/2017 at 0600 Yes Reported, Patient   LISINOPRIL PO Take 10 mg by mouth daily (Takes 0.5 x 20mg tablet = 10mg dose) 5/18/2017 at AM Yes Reported, Patient

## 2017-05-24 NOTE — ANESTHESIA PROCEDURE NOTES
Peripheral nerve/Neuraxial procedure note : intrathecal  Pre-Procedure  Performed by JUVENTINO SANTOS  Location: OR      Pre-Anesthestic Checklist: patient identified, IV checked, risks and benefits discussed, informed consent, monitors and equipment checked and pre-op evaluation    Timeout  Correct Patient: Yes   Correct Procedure: Yes       Correct Position: Yes     .   Procedure Documentation    .    Procedure:    Intrathecal.  Insertion Site:L3-4  (midline approach)      Patient Prep;mask, sterile gloves, povidone-iodine 7.5% surgical scrub, patient draped.  .  Needle: (). # of attempts: 1. # of redirects:. Spinal Needle: Jeannette-Selvin 24 G. 3.5 in.  Introducer used. . .     Assessment/Narrative  Paresthesias: No.  .  .  clear CSF fluid removed . Comments:  1% lidocaine to skin  No complications

## 2017-05-24 NOTE — ANESTHESIA POSTPROCEDURE EVALUATION
Patient: Vaibhav Crabtree    Procedure(s):  RIGHT TOTAL KNEE ARTHROPLASTY (BIOMET)^  - Wound Class: I-Clean    Diagnosis:END STAGE OSTEOARTHRITIS RIGHT KNEE   Diagnosis Additional Information: No value filed.    Anesthesia Type:  Spinal    Note:  Anesthesia Post Evaluation    Patient location during evaluation: PACU  Patient participation: Able to fully participate in evaluation  Level of consciousness: awake  Pain management: adequate  Airway patency: patent  Cardiovascular status: acceptable  Respiratory status: acceptable  Hydration status: acceptable  PONV: controlled     Anesthetic complications: None          Last vitals:  Vitals:    05/24/17 0949 05/24/17 1328 05/24/17 1330   BP: (!) 155/93  (!) 144/96   Pulse:      Resp: 16  11   Temp:  36.3  C (97.4  F) 35  C (95  F)   SpO2: 96%  95%         Electronically Signed By: Theo Yip MD  May 24, 2017  2:05 PM

## 2017-05-24 NOTE — ANESTHESIA PREPROCEDURE EVALUATION
Anesthesia Evaluation     . Pt has had prior anesthetic.     No history of anesthetic complications          ROS/MED HX    ENT/Pulmonary:     (+)tobacco use, Past use , . .   (-) sleep apnea   Neurologic:       Cardiovascular:     (+) Dyslipidemia, hypertension--CAD (CTA 1/2015 showed 50-70% LAD stenosis.  He was put on medication and since no angina.  He was cleared by cardiology before surgery despite no recent stress test or other imaging.  He is a very active person and is asymptomatic.  ), --. : . CHF Last EF: 50-55% date: 1/2015 . . :. valvular problems/murmurs type: MR 1+ MR 1/2015:. Previous cardiac testing Echodate:1/2015results:EF 50-55%, 1+MRdate: results: date: results: date: results:         (-) angina and angina   METS/Exercise Tolerance:  >4 METS   Hematologic:     (+) Anemia (11.5 preop), -      Musculoskeletal:   (+) arthritis, , , -       GI/Hepatic:        (-) GERD   Renal/Genitourinary:     (+) chronic renal disease (creat 1.39 preop), type: CRI,       Endo:         Psychiatric:         Infectious Disease:         Malignancy:         Other:                     Physical Exam  Normal systems: cardiovascular, pulmonary and dental    Airway   Mallampati: II  TM distance: >3 FB  Neck ROM: full    Dental     Cardiovascular       Pulmonary                     Anesthesia Plan      History & Physical Review  History and physical reviewed and following examination; no interval change.    ASA Status:  3 .    NPO Status:  > 8 hours    Plan for Spinal   PONV prophylaxis:  Ondansetron (or other 5HT-3)  -Femoral nerve block (via adductor canal) to help decrease postop pain  -He took his Coreg today      Postoperative Care  Postoperative pain management:  Peripheral nerve block (Single Shot).      Consents  Anesthetic plan, risks, benefits and alternatives discussed with:  Patient..                          .

## 2017-05-24 NOTE — IP AVS SNAPSHOT
[address] [facility]    [address] [place] [address]    Phone:  [phone]                                       After Visit Summary   5/24/2017    Vaibhav Crabtree    MRN: [id number]           After Visit Summary Signature Page     I have received my discharge instructions, and my questions have been answered. I have discussed any challenges I see with this plan with the nurse or doctor.    ..........................................................................................................................................  Patient/Patient Representative Signature      ..........................................................................................................................................  Patient Representative Print Name and Relationship to Patient    ..................................................               ................................................  Date                                            Time    ..........................................................................................................................................  Reviewed by Signature/Title    ...................................................              ..............................................  Date                                                            Time

## 2017-05-24 NOTE — ANESTHESIA PROCEDURE NOTES
Peripheral nerve/Neuraxial procedure note : Femoral (via adductor canal)  Pre-Procedure  Performed by JUVENTINO SANTOS  Location: pre-op      Pre-Anesthestic Checklist: patient identified, IV checked, risks and benefits discussed, informed consent, pre-op evaluation, at physician/surgeon's request and post-op pain management    Timeout  Correct Patient: Yes   Correct Procedure: Yes   Correct Site: Yes   Correct Laterality: Yes   Correct Position: Yes   Site Marked: Yes   .   Procedure Documentation    .    Procedure:    Femoral (via adductor canal).  Local skin infiltrated with mL of 1% lidocaine.     Ultrasound used to identify targeted nerve, plexus, or vascular marker and placed a needle adjacent to it., Ultrasound was used to visualize the spread of the anesthetic in close proximity to the above stated nerve. A permanent image is entered into the patient's record.  Patient Prep;sterile gloves, chlorhexidine gluconate and isopropyl alcohol.  .  Needle: short bevel (21 G. 3.13 in). .  Spinal Needle: . . Insertion Method: Single Shot.     Assessment/Narrative  Paresthesias: No.  Injection made incrementally with aspirations every 5 mL..  The placement was negative for: blood aspirated, painful injection and site bleeding.  Bolus given via needle..   Secured via.   Complications: none. Comments:  Sterile.  Ultrasound image saved. Ultrasound used to see nerve, needle adjacent, and local deposited around nerve. 20cc 0.5% ropivacaine

## 2017-05-24 NOTE — PROGRESS NOTES
05/24/17 1600   Quick Adds   Type of Visit Initial PT Evaluation   Living Environment   Lives With friend(s)   Living Arrangements (townhouse)   Home Accessibility stairs within home;bed and bath are not on the first floor   Number of Stairs to Enter Home 0   Number of Stairs Within Home 8  (L rail)   Stair Railings at Home inside, present on left side   Transportation Available car;family or friend will provide   Living Environment Comment Pt lives in a townhouse, has 3 renters. Also states his neice is coming to stay with him after surgery. All needs met on upper level up 8 stairs with L rail, walk-in shower   Self-Care   Dominant Hand right   Usual Activity Tolerance good   Current Activity Tolerance moderate   Regular Exercise yes   Activity/Exercise Type walking   Exercise Amount/Frequency daily   Equipment Currently Used at Home none   Activity/Exercise/Self-Care Comment Pt works full time at [employer] as a    Functional Level Prior   Ambulation 0-->independent   Transferring 0-->independent   Toileting 0-->independent   Bathing 0-->independent   Dressing 0-->independent   Eating 0-->independent   Communication 0-->understands/communicates without difficulty   Swallowing 0-->swallows foods/liquids without difficulty   Cognition 0 - no cognition issues reported   Fall history within last six months no   Which of the above functional risks had a recent onset or change? ambulation;transferring;toileting;bathing;dressing   Prior Functional Level Comment Pt reports independence with functional mobility at baseline without AD. Pt reports his roommates do all of the household tasks, was independent [facility] ADLS   General Information   Onset of Illness/Injury or Date of Surgery - Date 05/24/17   Referring Physician Marco A Iyer MDNone   Patient/Family Goals Statement Pt hopeful to return home with OPPT   Pertinent History of Current Problem (include personal factors and/or comorbidities that impact the  POC) Pt is a 67yo male POD#0 s/p R TKA   Precautions/Limitations fall precautions   Weight-Bearing Status - RLE weight-bearing as tolerated   General Observations Pt with hemovac, IV, continuous pulse ox, KI donned with ice, PCDs   General Info Comments Activity: Ambulate with assist 3 times daily, out of bed day of surgery if pt tolerates   Cognitive Status Examination   Orientation orientation to person, place and time   Level of Consciousness alert   Follows Commands and Answers Questions 100% of the time;able to follow single-step instructions   Personal Safety and Judgment at risk behaviors demonstrated   Memory intact   Pain Assessment   Patient Currently in Pain (1/10 at rest)   Integumentary/Edema   Integumentary/Edema Comments Incision R knee, dressing and hemovac intact   Posture    Posture Forward head position   Range of Motion (ROM)   ROM Comment RLE limited d/t pain, LLE WNL for functional mobility   Strength   Strength Comments RLE limited d/t pain, LLE WFL for functional mobility   Bed Mobility   Bed Mobility Comments Isabela supine>sit   Transfer Skills   Transfer Comments CGA sit>stand to FWW   Gait   Gait Comments CGA 5' with FWW, 3 pt gait   Balance   Balance Comments Good seated balance, fair to good standing balance with AD   Sensory Examination   Sensory Perception Comments Pt reports some residual R toe numbness   Coordination   Coordination Comments Gross motor coordination appears WNL   Muscle Tone   Muscle Tone no deficits were identified   General Therapy Interventions   Planned Therapy Interventions balance training;bed mobility training;gait training;neuromuscular re-education;ROM;strengthening;stretching;transfer training;progressive activity/exercise   Clinical Impression   Criteria for Skilled Therapeutic Intervention yes, treatment indicated   PT Diagnosis Impaired gait   Influenced by the following impairments Pain, weakness, decreased ROM, decreased balance, decreased activity  "tolerance   Functional limitations due to impairments Decreased safety and independence with functional transfers, gait, stairs   Clinical Presentation Stable/Uncomplicated   Clinical Presentation Rationale clinically improving   Clinical Decision Making (Complexity) Low complexity   Therapy Frequency` 2 times/day   Predicted Duration of Therapy Intervention (days/wks) 4 days   Anticipated Equipment Needs at Discharge front wheeled walker   Anticipated Discharge Disposition Home with Outpatient Therapy   Risk & Benefits of therapy have been explained Yes   Patient, Family & other staff in agreement with plan of care Yes   [facility] TM \"6 Clicks\"   2016, Trustees of [facility], under license to [employer].  All rights reserved.   6 Clicks Short Forms Basic Mobility Inpatient Short Form   [facility] AM-PAC  \"6 Clicks\" V.2 Basic Mobility Inpatient Short Form   1. Turning from your back to your side while in a flat bed without using bedrails? 3 - A Little   2. Moving from lying on your back to sitting on the side of a flat bed without using bedrails? 3 - A Little   3. Moving to and from a bed to a chair (including a wheelchair)? 3 - A Little   4. Standing up from a chair using your arms (e.g., wheelchair, or bedside chair)? 3 - A Little   5. To walk in hospital room? 3 - A Little   6. Climbing 3-5 steps with a railing? 2 - A Lot   Basic Mobility Raw Score (Score out of 24.Lower scores equate to lower levels of function) 17   Total Evaluation Time   Total Evaluation Time (Minutes) 10     "

## 2017-05-24 NOTE — PLAN OF CARE
Problem: Goal Outcome Summary  Goal: Goal Outcome Summary  Outcome: No Change  Returned from OR. Numbess--slight in toes. Alert and oriented. Denies nausea or pain. Chau patent. Hemovac unclamped at 1500 as ordered. Vitals stable. Dressing clean and dry. Stable thus far.

## 2017-05-24 NOTE — IP AVS SNAPSHOT
MRN:[id number]                      After Visit Summary   5/24/2017    Vaibhav Crabtree    MRN: [id number]           Thank you!     Thank you for choosing [facility] for your care. Our goal is always to provide you with excellent care. Hearing back from our patients is one way we can continue to improve our services. Please take a few minutes to complete the written survey that you may receive in the mail after you visit with us. Thank you!        Patient Information     Date Of Birth          1950        Designated Caregiver       Most Recent Value    Caregiver    Will someone help with your care after discharge? yes    Name of designated caregiver genevieve    Phone number of caregiver [phone]    Caregiver address [address]       About your hospital stay     You were admitted on:  May 24, 2017 You last received care in the:  [place] [address] [facility]    You were discharged on:  May 29, 2017        Reason for your hospital stay       tka                  Who to Call     For medical emergencies, please call 911.  For non-urgent questions about your medical care, please call your primary care provider or clinic, [phone]  For questions related to your surgery, please call your surgery clinic        Attending Provider     Provider Specialty    Marco A Iyer MD Surgery       Primary Care Provider Office Phone # Fax #    Xander ROMO MD Jesus [phone]       [address] [address]        After Care Instructions     Activity       Your activity upon discharge: activity as tolerated   Do  Your  Range  Of  Motion  Of  Your  Total knee  4 times  Per  Day for  20 min  Each  session            Diet       Follow this diet upon discharge: Regular            Discharge Instructions       Warfarin   5 mg  Today ,  Monday  And home rn  Will re ck  The inr  Tuesday  And will  Adjust  The  Dose  Then    lovenox  4omg   Today  Monday  And 40  Mg  Tuesday    Then  stop                  Follow-up Appointments     Follow-up and recommended labs and tests        Follow up with your PCP 1-2 weeks after discharge. You will need to have a repeat EGD in 2 months to reassess the ulcerations in your esophagus. You PCP can help arrange this appointment.            Follow-up and recommended labs and tests        Follow up with me,  Marco A Iyer, within 2 weeks  Post  D/c                  Additional Services     Dme Referral       Home  cpm  For    2  weeks            Home Care PT Referral for Hospital Discharge       PT to elsy and treat  4  Visits  Over  10  Days   Then  Will  Switch to  Out  Pt  pt    Your provider has ordered home care - physical therapy. If you have not been contacted within 2 days of your discharge please call the department phone number listed on the top of this document.            Home care nursing referral       RN skilled nursing visit. RN to assess    inr    Draws  And    Adherence  To  Self  Care   And  Make  sure  No more esophageal  bleeds.    Your provider has ordered home care nursing services. If you have not been contacted within 2 days of your discharge please call the inpatient department phone number at [phone] .                  Warfarin Instruction     You have started taking a medicine called warfarin. This is a blood-thinning medicine (anticoagulant). It helps prevent and treat blood clots.      Before leaving the hospital, make sure you know how much to take and how long to take it.      You will need regular blood tests to make sure your blood is clotting safely. It is very important to see your doctor for regular blood tests.    Talk to your doctor before taking any new medicine (this includes over-the-counter drugs and herbal products). Many medicines can interact with warfarin. This may cause more bleeding or too much clotting.     Eating a lot of vitamin K--found in green, leafy  "vegetables--can change the way warfarin works in your body. Do NOT avoid these foods. Instead, try to eat the same amount each day.     Bleeding is the most common side-effect of warfarin. You may notice bleeding gums, a bloody nose, bruises and bleeding longer when you cut yourself. See a doctor at once if:   o You cough up blood  o You find blood in your stool (poop)  o You have a deep cut, or a cut that bleeds longer than 10 minutes   o You have a bad cut, hard fall, accident or hit your head (go to urgent care or the emergency room).    For women who can get pregnant: This medicine can harm an unborn baby. Be very careful not to get pregnant while taking this medicine. If you think you might be pregnant, call your doctor right away.    For more information, read \"Guide to Warfarin Therapy,  the booklet you received in the hospital.        Pending Results     No orders found from 5/22/2017 to 5/25/2017.            Statement of Approval     Ordered          05/29/17 0730  I have reviewed and agree with all the recommendations and orders detailed in this document.  EFFECTIVE NOW     Approved and electronically signed by:  Marco A Iyer MD             Admission Information     Date & Time Provider Department Dept. Phone    5/24/2017 Marco A Iyer MD [place] [address] [facility] [phone]      Your Vitals Were     Blood Pressure Pulse Temperature Respirations Height Weight    146/92 (BP Location: Right arm) 83 98.1  F (36.7  C) (Oral) 16 1.702 m (5' 7\") 75.3 kg (166 lb)    Pulse Oximetry BMI (Body Mass Index)                93% 26 kg/m2          [employer] Information     [employer] lets you send messages to your doctor, view your test results, renew your prescriptions, schedule appointments and more. To sign up, go to [email or web address]. . Click on \"Log in\" on the left side of the screen, which will take you to the Welcome page. Then click on \"Sign up Now\" on the right side of the page. "     You will be asked to enter the access code listed below, as well as some personal information. Please follow the directions to create your username and password.     Your access code is: [email or web address]  Expires: 2017  9:53 AM     Your access code will  in 90 days. If you need help or a new code, please call your [facility] clinic or [phone].        Care EveryWhere ID     This is your Care EveryWhere ID. This could be used by other organizations to access your [facility] medical records  [id number]           Review of your medicines      START taking        Dose / Directions    enoxaparin 40 MG/0.4ML injection   Commonly known as:  LOVENOX   Used for:  Acute venous embolism and thrombosis of deep vessels of distal lower extremity, right (H)        Dose:  40 mg   Inject 0.4 mLs (40 mg) Subcutaneous daily for 2 days   Quantity:  0.8 mL   Refills:  0       ferrous gluconate 324 (38 FE) MG tablet   Commonly known as:  FERGON   Used for:  Acute venous embolism and thrombosis of deep vessels of distal lower extremity, right (H), Ulcer of esophagus without bleeding        Dose:  324 mg   Take 1 tablet (324 mg) by mouth daily (with breakfast)   Quantity:  60 tablet   Refills:  0       * order for DME        Equipment being ordered: Walker Wheels () and Walker () Treatment Diagnosis: Impaired gait   Quantity:  1 each   Refills:  0       * order for DME        Equipment being ordered: Crutches () Treatment Diagnosis: Impaired gait   Quantity:  1 each   Refills:  0       pantoprazole 40 MG EC tablet   Commonly known as:  PROTONIX   Used for:  Ulcer of esophagus without bleeding        Dose:  40 mg   Take 1 tablet (40 mg) by mouth 2 times daily (before meals)   Quantity:  60 tablet   Refills:  0       senna-docusate 8.6-50 MG per tablet   Commonly known as:  SENOKOT-S;PERICOLACE        Dose:  1-2 tablet   Take 1-2 tablets by mouth 2 times daily   Quantity:  100 tablet   Refills:  0       traMADol 50  MG tablet   Commonly known as:  ULTRAM        Dose:  50 mg   Take 1 tablet (50 mg) by mouth every 6 hours as needed for moderate pain   Quantity:  80 tablet   Refills:  0       * Warfarin Therapy Reminder   Used for:  Acute venous embolism and thrombosis of deep vessels of distal lower extremity, right (H)        Dose:  1 each   1 each continuous prn   Quantity:  1 each   Refills:  0       * warfarin 2.5 MG tablet   Commonly known as:  COUMADIN   Used for:  Acute venous embolism and thrombosis of deep vessels of distal lower extremity, right (H)        Take  As directed   Quantity:  30 tablet   Refills:  0       * warfarin 5 MG tablet   Commonly known as:  COUMADIN   Used for:  Acute venous embolism and thrombosis of deep vessels of distal lower extremity, right (H)        Take  As  directed   Quantity:  30 tablet   Refills:  0       * Notice:  This list has 5 medication(s) that are the same as other medications prescribed for you. Read the directions carefully, and ask your doctor or other care provider to review them with you.      CONTINUE these medicines which have NOT CHANGED        Dose / Directions    COREG PO        Dose:  25 mg   Take 25 mg by mouth 2 times daily   Refills:  0       FUROSEMIDE PO        Dose:  20 mg   Take 20 mg by mouth daily   Refills:  0       LISINOPRIL PO        Dose:  10 mg   Take 10 mg by mouth daily (Takes 0.5 x 20mg tablet = 10mg dose)   Refills:  0         STOP taking     ASPIRIN PO           PRILOSEC PO                Where to get your medicines      These medications were sent to [facility] [place], [place] - [address]  [address], [place] [address]     Phone:  [phone]     enoxaparin 40 MG/0.4ML injection    ferrous gluconate 324 (38 FE) MG tablet    pantoprazole 40 MG EC tablet    senna-docusate 8.6-50 MG per tablet    warfarin 2.5 MG tablet    warfarin 5 MG tablet         Some of these will need a paper prescription and others can be  bought over the counter. Ask your nurse if you have questions.     Bring a paper prescription for each of these medications     order for DME    order for DME    traMADol 50 MG tablet    Warfarin Therapy Reminder                Protect others around you: Learn how to safely use, store and throw away your medicines at www.disposemymeds.org.             Medication List: This is a list of all your medications and when to take them. Check marks below indicate your daily home schedule. Keep this list as a reference.      Medications           Morning Afternoon Evening Bedtime As Needed    COREG PO   Take 25 mg by mouth 2 times daily   Last time this was given:  25 mg on 5/29/2017  9:06 AM                                enoxaparin 40 MG/0.4ML injection   Commonly known as:  LOVENOX   Inject 0.4 mLs (40 mg) Subcutaneous daily for 2 days   Last time this was given:  40 mg on 5/29/2017  9:06 AM                                ferrous gluconate 324 (38 FE) MG tablet   Commonly known as:  FERGON   Take 1 tablet (324 mg) by mouth daily (with breakfast)                                FUROSEMIDE PO   Take 20 mg by mouth daily                                LISINOPRIL PO   Take 10 mg by mouth daily (Takes 0.5 x 20mg tablet = 10mg dose)   Last time this was given:  10 mg on 5/29/2017  9:06 AM                                * order for DME   Equipment being ordered: Walker Wheels () and Walker () Treatment Diagnosis: Impaired gait                                * order for DME   Equipment being ordered: Crutches () Treatment Diagnosis: Impaired gait                                pantoprazole 40 MG EC tablet   Commonly known as:  PROTONIX   Take 1 tablet (40 mg) by mouth 2 times daily (before meals)   Last time this was given:  40 mg on 5/29/2017  6:19 AM                                senna-docusate 8.6-50 MG per tablet   Commonly known as:  SENOKOT-S;PERICOLACE   Take 1-2 tablets by mouth 2 times daily   Last time  this was given:  2 tablets on 5/29/2017  9:06 AM                                traMADol 50 MG tablet   Commonly known as:  ULTRAM   Take 1 tablet (50 mg) by mouth every 6 hours as needed for moderate pain   Last time this was given:  50 mg on 5/29/2017  6:19 AM                                * Warfarin Therapy Reminder   1 each continuous prn   Last time this was given:  517 ml given on 5/24/2017 12:21 PM                                * warfarin 2.5 MG tablet   Commonly known as:  COUMADIN   Take  As directed   Last time this was given:  6 mg on 5/28/2017  5:06 PM                                * warfarin 5 MG tablet   Commonly known as:  COUMADIN   Take  As  directed   Last time this was given:  6 mg on 5/28/2017  5:06 PM                                * Notice:  This list has 5 medication(s) that are the same as other medications prescribed for you. Read the directions carefully, and ask your doctor or other care provider to review them with you.

## 2017-05-24 NOTE — PLAN OF CARE
Problem: Goal Outcome Summary  Goal: Goal Outcome Summary  PT: PT orders received, evaluation completed, treatment initiated. Pt is a 65yo male POD#0 s/p R total knee arthroplasty. Pt lives in a townhouse which he rents out to 3 renters. Pt has a level entry, and up 8 steps with L rail to his main level with walk-in shower. Pt reports independence with ADLs and functional mobility without AD at baseline. Pt states his renters take care of all household tasks including his laundry. 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 Pt reports his renters are home and able to assist as needed, and pt's niece is planning to come stay with him.      Surgeon Discharge Plan: None documented. Pt plans to return home with OPPT at discharge.     Current Functional Status: Pain 1/10 at rest. Pt on RA with SpO2 >93% throughout. Pt tolerated TKA exercises fairly well with minimal increase in pain. Pt transfers supine>sit with Isabela. Pt transfers sit>stand to FWW with CGA. Pt ambulates 30' with FWW and CGA, KI donned, WBAT on RLE. Pt impulsive with mobility at times, needs cues to wait for lines/equipment. RN updated. R knee AAROM 14-82 degrees.     Barriers to Plan/Home: Limited ambulation, stairs

## 2017-05-24 NOTE — ANESTHESIA CARE TRANSFER NOTE
Patient: Vaibhav Crabtree    Procedure(s):  RIGHT TOTAL KNEE ARTHROPLASTY (BIOMET)^  - Wound Class: I-Clean    Diagnosis: END STAGE OSTEOARTHRITIS RIGHT KNEE   Diagnosis Additional Information: No value filed.    Anesthesia Type:   Spinal     Note:  Airway :Room Air  Patient transferred to:PACU  Comments: Vs upon arrival to PACU 142/89, 76, 16, 96% on room air. Pt comfortable, report given to CINDY Olson.      Vitals: (Last set prior to Anesthesia Care Transfer)    CRNA VITALS  5/24/2017 1256 - 5/24/2017 1330      5/24/2017             NIBP: (!)  132/97    NIBP Mean: 110                Electronically Signed By: TRESA Anderson CRNA  May 24, 2017  1:30 PM

## 2017-05-24 NOTE — CONSULTS
HOSPITALIST CONSULTATION      DATE OF SERVICE:  05/24/2017      PRIMARY CARE PHYSICIAN:  Xander Lee MD      REQUESTING PHYSICIAN:  Marco A Iyer MD      REASON FOR CONSULTATION:  Medical co-management.      History is provided by the patient.      HISTORY OF PRESENT ILLNESS:  Vaibhav Crabtree is a 66-year-old male with history of medically managed coronary artery disease, systolic congestive heart failure, idiopathic cardiomyopathy, hyperlipidemia, hypertension, stage 3 chronic kidney disease, iron deficiency anemia, and osteoarthritis who is admitted under the care of the Orthopedic Service for routine postop management following elective right total knee arthroplasty performed by Dr. Iyer.  The procedure was performed under a spinal anesthesia and a femoral nerve block.  Estimated blood loss was not documented.  He was treated preoperatively with Ancef antibiotic prophylaxis.  He tolerated the procedure well.      Presently the patient is evaluated in his hospital room.  He offers no complaints and feels his postop pain is well controlled at present.  Specifically he denies chest pain, shortness of breath, abdominal pain, nausea and vomiting.      He has a history of idiopathic cardiomyopathy that was diagnosed in 2014 after an echocardiogram revealed an ejection fraction of 25% to 30%.  Subsequently, the patient had a CT angiogram that revealed 50% to 70% stenosis in the LAD in 01/2015.  He was treated medically, and followup echocardiogram has showed improvement in his ejection fraction of 50% to 55%.  He denies any recent symptoms of chest pain, difficulty breathing, dyspnea, peripheral edema, palpitations.  Additionally, his Lasix diuretic was discontinued after describing orthostatic hypotension over the winter and spring months as well as repeated episodes of acute on chronic renal insufficiency.      PAST MEDICAL HISTORY:   1.  Idiopathic cardiomyopathy diagnosed in 2014 with ejection fraction of 25% to  30%.  This was treated medically and repeat echocardiogram showed improvement of  EF to 50% to 55%.  He follows with Dr. Asif of Cardiology.   2.  Systolic congestive heart failure.   3.  Hypertension.   4.  Hyperlipidemia.   5.  Coronary artery disease with CT angiogram in 2015 showing 50% to 70% stenosis in the LAD distribution.   6.  Mitral regurgitation noted on echocardiogram.   7.  Chronic kidney disease, stage 3.   8.  Osteoarthritis.   9.  Iron deficiency anemia identified during preoperative evaluation.      PAST SURGICAL HISTORY:   1.  Ulnar nerve surgery in .   2.  Right rotator cuff repair.   3.  Left rotator cuff repair.   4.  Tonsillectomy.      FAMILY HISTORY:  Mother  at age 68 of unknown cause.  Father  at age 51 of emphysema.      SOCIAL HISTORY:  The patient quit smoking in .  He has not had an alcoholic drink in 2-1/2 months by choice.  He has no history of alcoholic abuse or dependence per his report.  No history of illicit drug use.  Lives in [place] in a townhouse and he rents to renters.        PRIOR TO ADMISSION MEDICATIONS:   1.  Aspirin 325 mg daily.   2.  Carvedilol 25 mg b.i.d.   3.  Lisinopril 10 mg daily.   4.  Omeprazole 20 mg daily.      Of note, the patient has furosemide 20 mg daily listed on his prior to admission medications.  He confirms that this medication was discontinued with his primary care provider, and his cardiologist agreed with the change at his preoperative evaluation on 2017.      ALLERGIES:  No known drug allergies.      REVIEW OF SYSTEMS:  A complete review of systems was performed and is negative except for that noted in the HPI.      PHYSICAL EXAMINATION:   VITAL SIGNS:  Blood pressure 146/96, pulse 70, temperature 97.7, respirations are 16, oxygen saturation is 99%.  Height is 5 feet 7 inches.  Weight is 166 pounds giving him a  BMI of 26.   GENERAL:  A pleasant elderly male who looks stated age and appears comfortable lying flat  in bed.   SKIN:  Warm, dry.  No rashes or lesions on exposed skin.   HEENT:  Normocephalic, atraumatic.  EOMs intact.  PERRLA.  Moist mucous membranes.   NECK:  Supple.  No cervical lymphadenopathy.   CHEST:  Breath sounds clear to auscultation anteriorly and no increased work of breathing on 3 liters supplemental oxygen by nasal cannula.   CARDIOVASCULAR:  Regular rate and rhythm.  No rub or murmur appreciated.  No peripheral edema.  DP pulses detected and symmetric.   ABDOMEN:  Soft, nontender, nondistended.  Bowel sounds present.   MUSCULOSKELETAL:  Moves all 4 extremities.  Right lower extremity is Ace wrapped and in a knee immobilizer.  A wound V.A.C. is present with small amount of darcy bloody output.   NEUROLOGIC:  Cranial nerves 2-12 grossly intact.   PSYCHIATRIC:  Affect and mood congruent.   GENITOURINARY:  Chau catheter in place with a small amount of clear yellow urine output.      LABORATORY DATA:  Potassium is 4.6.  Hemoglobin is 12.4.      ASSESSMENT AND PLAN:  Vaibhav Crabtree is a 66-year-old male with history of coronary artery disease, systolic congestive heart failure, idiopathic cardiomyopathy, hypertension, hyperlipidemia, iron deficiency anemia, chronic kidney disease stage 3, and osteoarthritis who is admitted under the care of the Orthopedic Service for routine postoperative management following elective right total knee arthroplasty by Dr. Iyer.  The Hospitalist Service is consulted for medical co-management.     1.  Right knee osteoarthritis.   -- Postop day #0 status post right total knee arthroplasty under spinal and femoral nerve blocks.   -- Routine postoperative management including deep venous thrombosis prophylaxis, IV fluids and pain management per Orthopedic Service.   -- Preoperative hemoglobin this morning was 12.4 and we will recheck hemoglobin in the a.m.     2.  Hypertension.  Blood pressure is elevated in the 140 to upper 150 systolic region over 90 systolic.   -- I have  discontinued the furosemide order.  His furosemide was discontinued by his primary care provider and confirmed by his cardiologist that this medication no longer needs to be prescribed.  Do not prescribe at discharge.   -- Hold prior to admission lisinopril given his history of chronic kidney disease stage 3 and recurrent episodes of acute on chronic kidney insufficiency.   --  P.r.n. hydralazine is available for SBP greater than 160.   -- Continue prior to admission Coreg.     -- Check BMP in the a.m. and resume lisinopril if kidney functioning and blood pressure allows.     3.  Stage 3 chronic kidney disease.  Baseline creatinine in our system is 1.21 and this was the same creatinine from his preoperative exam.   --  The patient is on IV normal saline at 100 mL per hour.   --  Holding prior to admission lisinopril given his history of underlying stage 3 chronic kidney disease and recurrent episodes of acute kidney insufficiency.     -- Check BMP in the a.m. and resume lisinopril if blood pressure and kidney functioning allow.     4.  Iron deficiency anemia.  Preop hemoglobin today was 12.4.  He was diagnosed with iron deficiency anemia during his preoperative evaluation.  He received Procrit and Injectafer infusions prior to surgery.   -- Monitor hemoglobin.  Estimated blood loss was not documented but anticipate acute worsening of anemia due to acute blood loss anemia.     5.  Coronary artery disease with CTA from 01/2015 showing 50% to 70% stenosis in the LAD distribution.   Idiopathic cardiomyopathy diagnosed in 2014 off of echocardiogram.  Follows with cardiologist Dr. Asif.  Treated with medical management and his ejection fraction improved to 50% to 55%.  Last echocardiogram was 01/2016.   Systolic congestive heart failure.  Currently does not appear overloaded   -- Continue prior to admission carvedilol.   -- Prior to admission aspirin 325 mg daily is on hold per Orthopedic Service.  Recommend resuming  when okay with Orthopedics.     Deep venous thrombosis prophylaxis:  Currently on enoxaparin prophylaxis.   Code status:  Full code.     Disposition:  Discharge per Orthopedics.      The Hospitalist Service thanks you for this consultation and we will continue to follow along with you.      The patient was discussed with Dr. Shari Aquino of the Hospitalist Service who is in agreement with my assessment and plan of care.         SHARI AQUINO MD       As dictated by JOANNA ULMEN BARTHELL, PA-C            D: 2017 16:02   T: 2017 17:49   MT: CONRAD      Name:     DAMIAN MEEKS   MRN:      [id number]        Account:       [id number]   :      1950           Consult Date:  2017      Document: [id number]       cc: Marco A Lee MD

## 2017-05-25 ENCOUNTER — APPOINTMENT (OUTPATIENT)
Dept: ULTRASOUND IMAGING | Facility: CLINIC | Age: 67
DRG: 470 | End: 2017-05-25
Attending: INTERNAL MEDICINE
Payer: COMMERCIAL

## 2017-05-25 LAB
ANION GAP SERPL CALCULATED.3IONS-SCNC: 7 MMOL/L (ref 3–14)
BUN SERPL-MCNC: 13 MG/DL (ref 7–30)
CALCIUM SERPL-MCNC: 8 MG/DL (ref 8.5–10.1)
CHLORIDE SERPL-SCNC: 108 MMOL/L (ref 94–109)
CO2 SERPL-SCNC: 25 MMOL/L (ref 20–32)
CREAT SERPL-MCNC: 1.09 MG/DL (ref 0.66–1.25)
ERYTHROCYTE [DISTWIDTH] IN BLOOD BY AUTOMATED COUNT: 23.1 % (ref 10–15)
GFR SERPL CREATININE-BSD FRML MDRD: 67 ML/MIN/1.7M2
GLUCOSE SERPL-MCNC: 102 MG/DL (ref 70–99)
HCT VFR BLD AUTO: 36.7 % (ref 40–53)
HGB BLD-MCNC: 11.1 G/DL (ref 13.3–17.7)
INR PPP: 1.16 (ref 0.86–1.14)
MCH RBC QN AUTO: 27.6 PG (ref 26.5–33)
MCHC RBC AUTO-ENTMCNC: 30.2 G/DL (ref 31.5–36.5)
MCV RBC AUTO: 91 FL (ref 78–100)
PLATELET # BLD AUTO: 369 10E9/L (ref 150–450)
POTASSIUM SERPL-SCNC: 4 MMOL/L (ref 3.4–5.3)
RBC # BLD AUTO: 4.02 10E12/L (ref 4.4–5.9)
SODIUM SERPL-SCNC: 140 MMOL/L (ref 133–144)
WBC # BLD AUTO: 5.9 10E9/L (ref 4–11)

## 2017-05-25 PROCEDURE — 25000128 H RX IP 250 OP 636: Performed by: ORTHOPAEDIC SURGERY

## 2017-05-25 PROCEDURE — 36415 COLL VENOUS BLD VENIPUNCTURE: CPT | Performed by: ORTHOPAEDIC SURGERY

## 2017-05-25 PROCEDURE — 85027 COMPLETE CBC AUTOMATED: CPT | Performed by: ORTHOPAEDIC SURGERY

## 2017-05-25 PROCEDURE — 85610 PROTHROMBIN TIME: CPT | Performed by: ORTHOPAEDIC SURGERY

## 2017-05-25 PROCEDURE — 93971 EXTREMITY STUDY: CPT | Mod: RT

## 2017-05-25 PROCEDURE — 25000132 ZZH RX MED GY IP 250 OP 250 PS 637: Performed by: PHYSICIAN ASSISTANT

## 2017-05-25 PROCEDURE — 99232 SBSQ HOSP IP/OBS MODERATE 35: CPT | Performed by: INTERNAL MEDICINE

## 2017-05-25 PROCEDURE — 12000007 ZZH R&B INTERMEDIATE

## 2017-05-25 PROCEDURE — 25000132 ZZH RX MED GY IP 250 OP 250 PS 637: Performed by: INTERNAL MEDICINE

## 2017-05-25 PROCEDURE — 80048 BASIC METABOLIC PNL TOTAL CA: CPT | Performed by: ORTHOPAEDIC SURGERY

## 2017-05-25 PROCEDURE — 25000132 ZZH RX MED GY IP 250 OP 250 PS 637: Performed by: ORTHOPAEDIC SURGERY

## 2017-05-25 RX ORDER — OXYCODONE HCL 10 MG/1
10 TABLET, FILM COATED, EXTENDED RELEASE ORAL EVERY 12 HOURS
Status: DISCONTINUED | OUTPATIENT
Start: 2017-05-25 | End: 2017-05-25

## 2017-05-25 RX ORDER — HYDROMORPHONE HYDROCHLORIDE 1 MG/ML
.3-.5 INJECTION, SOLUTION INTRAMUSCULAR; INTRAVENOUS; SUBCUTANEOUS
Status: DISCONTINUED | OUTPATIENT
Start: 2017-05-25 | End: 2017-05-29 | Stop reason: HOSPADM

## 2017-05-25 RX ORDER — LISINOPRIL 10 MG/1
10 TABLET ORAL DAILY
Status: DISCONTINUED | OUTPATIENT
Start: 2017-05-25 | End: 2017-05-26

## 2017-05-25 RX ORDER — OXYCODONE HYDROCHLORIDE 5 MG/1
5-10 TABLET ORAL
Status: DISCONTINUED | OUTPATIENT
Start: 2017-05-25 | End: 2017-05-29 | Stop reason: HOSPADM

## 2017-05-25 RX ORDER — WARFARIN SODIUM 7.5 MG/1
7.5 TABLET ORAL
Status: DISCONTINUED | OUTPATIENT
Start: 2017-05-25 | End: 2017-05-25

## 2017-05-25 RX ORDER — KETOROLAC TROMETHAMINE 15 MG/ML
15 INJECTION, SOLUTION INTRAMUSCULAR; INTRAVENOUS EVERY 6 HOURS PRN
Status: DISCONTINUED | OUTPATIENT
Start: 2017-05-25 | End: 2017-05-25

## 2017-05-25 RX ORDER — OXYCODONE HCL 10 MG/1
10 TABLET, FILM COATED, EXTENDED RELEASE ORAL EVERY 12 HOURS
Status: DISCONTINUED | OUTPATIENT
Start: 2017-05-25 | End: 2017-05-26 | Stop reason: HOSPADM

## 2017-05-25 RX ORDER — WARFARIN SODIUM 7.5 MG/1
7.5 TABLET ORAL
Status: COMPLETED | OUTPATIENT
Start: 2017-05-25 | End: 2017-05-25

## 2017-05-25 RX ADMIN — KETOROLAC TROMETHAMINE 15 MG: 15 INJECTION, SOLUTION INTRAMUSCULAR; INTRAVENOUS at 07:03

## 2017-05-25 RX ADMIN — SODIUM CHLORIDE: 9 INJECTION, SOLUTION INTRAVENOUS at 22:04

## 2017-05-25 RX ADMIN — OXYCODONE HYDROCHLORIDE 10 MG: 5 TABLET ORAL at 03:11

## 2017-05-25 RX ADMIN — CEFAZOLIN SODIUM 1 G: 1 INJECTION, POWDER, FOR SOLUTION INTRAMUSCULAR; INTRAVENOUS at 03:29

## 2017-05-25 RX ADMIN — LISINOPRIL 10 MG: 10 TABLET ORAL at 09:12

## 2017-05-25 RX ADMIN — HYDROMORPHONE HYDROCHLORIDE 0.5 MG: 1 INJECTION, SOLUTION INTRAMUSCULAR; INTRAVENOUS; SUBCUTANEOUS at 11:45

## 2017-05-25 RX ADMIN — CYCLOBENZAPRINE HYDROCHLORIDE 5 MG: 5 TABLET, FILM COATED ORAL at 04:14

## 2017-05-25 RX ADMIN — CARVEDILOL 25 MG: 25 TABLET, FILM COATED ORAL at 09:12

## 2017-05-25 RX ADMIN — ENOXAPARIN SODIUM 40 MG: 40 INJECTION SUBCUTANEOUS at 22:03

## 2017-05-25 RX ADMIN — HYDROMORPHONE HYDROCHLORIDE 0.5 MG: 1 INJECTION, SOLUTION INTRAMUSCULAR; INTRAVENOUS; SUBCUTANEOUS at 14:41

## 2017-05-25 RX ADMIN — HYDROMORPHONE HYDROCHLORIDE 0.5 MG: 1 INJECTION, SOLUTION INTRAMUSCULAR; INTRAVENOUS; SUBCUTANEOUS at 19:35

## 2017-05-25 RX ADMIN — HYDROMORPHONE HYDROCHLORIDE 0.5 MG: 1 INJECTION, SOLUTION INTRAMUSCULAR; INTRAVENOUS; SUBCUTANEOUS at 09:11

## 2017-05-25 RX ADMIN — WARFARIN SODIUM 7.5 MG: 7.5 TABLET ORAL at 18:04

## 2017-05-25 RX ADMIN — PROCHLORPERAZINE EDISYLATE 5 MG: 5 INJECTION INTRAMUSCULAR; INTRAVENOUS at 17:31

## 2017-05-25 RX ADMIN — OXYCODONE HYDROCHLORIDE 10 MG: 5 TABLET ORAL at 10:03

## 2017-05-25 RX ADMIN — ACETAMINOPHEN 975 MG: 325 TABLET, FILM COATED ORAL at 09:12

## 2017-05-25 RX ADMIN — SENNOSIDES AND DOCUSATE SODIUM 1 TABLET: 8.6; 5 TABLET ORAL at 09:12

## 2017-05-25 RX ADMIN — OXYCODONE HYDROCHLORIDE 10 MG: 5 TABLET ORAL at 07:02

## 2017-05-25 RX ADMIN — SODIUM CHLORIDE: 9 INJECTION, SOLUTION INTRAVENOUS at 14:42

## 2017-05-25 RX ADMIN — OMEPRAZOLE 20 MG: 20 CAPSULE, DELAYED RELEASE ORAL at 09:12

## 2017-05-25 RX ADMIN — ACETAMINOPHEN 975 MG: 325 TABLET, FILM COATED ORAL at 00:35

## 2017-05-25 RX ADMIN — HYDROMORPHONE HYDROCHLORIDE 0.5 MG: 1 INJECTION, SOLUTION INTRAMUSCULAR; INTRAVENOUS; SUBCUTANEOUS at 00:32

## 2017-05-25 RX ADMIN — ONDANSETRON 4 MG: 2 SOLUTION INTRAMUSCULAR; INTRAVENOUS at 13:48

## 2017-05-25 RX ADMIN — CYCLOBENZAPRINE HYDROCHLORIDE 5 MG: 5 TABLET, FILM COATED ORAL at 13:32

## 2017-05-25 RX ADMIN — OXYCODONE HYDROCHLORIDE 10 MG: 10 TABLET, FILM COATED, EXTENDED RELEASE ORAL at 07:02

## 2017-05-25 RX ADMIN — ENOXAPARIN SODIUM 40 MG: 40 INJECTION SUBCUTANEOUS at 11:43

## 2017-05-25 RX ADMIN — HYDROMORPHONE HYDROCHLORIDE 0.5 MG: 1 INJECTION, SOLUTION INTRAMUSCULAR; INTRAVENOUS; SUBCUTANEOUS at 22:32

## 2017-05-25 RX ADMIN — HYDROMORPHONE HYDROCHLORIDE 0.5 MG: 1 INJECTION, SOLUTION INTRAMUSCULAR; INTRAVENOUS; SUBCUTANEOUS at 05:00

## 2017-05-25 RX ADMIN — OXYCODONE HYDROCHLORIDE 10 MG: 5 TABLET ORAL at 13:31

## 2017-05-25 NOTE — PLAN OF CARE
"Problem: Goal Outcome Summary  Goal: Goal Outcome Summary  VSS. A/O x 4. Attempt to wean from O2 unsuccessful and return to 1 LNC. LS CTA. IS indp. R knee CDI, CMS intact. Pain control moderate with Oxycodone, Tylenol, Dilaudid IV for bkth, Flexeril, ice and repo.  Pain is mostly \"stabbing and behind my knee.\" He says he was able to lift his leg yesterday and now cannot. Tolerating PO-adv to regular, IV SURJIT Chau to DC this a.m. [facility].       "

## 2017-05-25 NOTE — PLAN OF CARE
Problem: Goal Outcome Summary  Goal: Goal Outcome Summary  Outcome: Improving  Patient up with assist of 1 and walker.  Pain managed with IV Dilaudid, Flexiril, and ice.  VSS.  A/Ox4.  CMS intact.  Dressing CDI.  2L NC.  Nausea; Zofran and Compazine given.

## 2017-05-25 NOTE — PROGRESS NOTES
[facility]    Hospitalist Progress Note    Date of Service (when I saw the patient): 05/25/2017    Assessment & Plan   Vaibhav Crabtree is a 66-year-old male with history of coronary artery disease, systolic congestive heart failure, idiopathic cardiomyopathy, hypertension, hyperlipidemia, iron deficiency anemia, chronic kidney disease stage 3, and osteoarthritis who is admitted under the care of the Orthopedic Service for routine postoperative management following elective right total knee arthroplasty by Dr. Iyer.  The Hospitalist Service is consulted for medical co-management.      Right knee osteoarthritis. Postop day #1 status post right total knee arthroplasty under spinal and femoral nerve blocks.   -- Routine postoperative management per Orthopedic Service, including pain control and IVF    RLE Post-op DVT. Patient c/o pain in right calf after surgery. He states having had a DVT in the same leg years ago.  - RLE US positive for clot, notified by radiology dept, formal report pending  - Tx deferred to ortho --> RN notified ortho who have changed lovenox to BID (reduced dose) and starting warfarin today  - ? No pharmacy consult for warfarin, one time dose written for tonight --> f/u with ortho if they plan to dose or want pharmacy  - INR today 1.16     Hypertension. Blood pressure is elevated in the 140 to upper 150 systolic region over 90 systolic post-op. His furosemide was discontinued by his primary care provider and confirmed by his cardiologist that this medication no longer needs to be prescribed.  Do not prescribe at discharge.   -- Resume PTA Lisinopril today  -- Continue prior to admission Coreg.     --  P.r.n. hydralazine is available for SBP greater than 160.   -- Check BMP in the a.m.      Stage 3 chronic kidney disease.  Baseline creatinine in our system is 1.21 and this was the same creatinine from his preoperative exam.   --  IVF per primary service  -- Check BMP in the a.m.       Iron deficiency anemia.  Preop hemoglobin was 12.4.  He was diagnosed with iron deficiency anemia during his preoperative evaluation.  He received Procrit and Injectafer infusions prior to surgery. Estimated blood loss was not documented but anticipate acute worsening of anemia due to acute blood loss anemia.   - Hgb today 11.1     Coronary artery disease with CTA from 01/2015 showing 50% to 70% stenosis in the LAD distribution.   - monitor    Idiopathic cardiomyopathy diagnosed in 2014 off of echocardiogram.  Follows with cardiologist Dr. Asif.  Treated with medical management and his ejection fraction improved to 50% to 55%.  Last echocardiogram was 01/2016.   - monitor    Systolic congestive heart failure.  Currently does not appear overloaded   -- PTA carvedilol.   -- Prior to admission aspirin 325 mg daily is on hold per Orthopedic Service.  Recommend resuming when okay with Orthopedics.     DVT Prophylaxis: Enoxaparin (Lovenox) SQ  Code Status: Full Code    Disposition: Expected discharge in 2-3 days pending therapy evaluations and pain control in [facility].    Malik Hope MD    Interval History   C/o severe pain in right calf starting after surgery. Has had a DVT in same leg many years ago per his recollection. Denies SOB/chest pain/pleurisy.    -Data reviewed today: I reviewed all new labs and imaging results over the last 24 hours. I personally reviewed no images or EKG's today. Radiology dept notified me of positive DVT in [facility] US.    Physical Exam   Temp: 98.5  F (36.9  C) Temp src: Oral BP: 110/71 Pulse: 92 Heart Rate: 92 Resp: 18 SpO2: 90 % O2 Device: None (Room air) Oxygen Delivery: 1 LPM  Vitals:    05/24/17 0857   Weight: 75.3 kg (166 lb)     Vital Signs with Ranges  Temp:  [97.6  F (36.4  C)-98.6  F (37  C)] 98.5  F (36.9  C)  Pulse:  [80-92] 92  Heart Rate:  [65-92] 92  Resp:  [16-18] 18  BP: (103-157)/(60-98) 110/71  SpO2:  [87 %-99 %] 90 %  I/O last 3 completed shifts:  In: 1710 [P.O.:810;  I.V.:900]  Out: 825 [Urine:750; Drains:75]    Constitutional: Awake, alert, cooperative, no apparent distress  Respiratory: Clear to auscultation bilaterally, no crackles or wheezing  Cardiovascular: Regular rate and rhythm, normal S1 and S2, and no murmur noted  GI: Normal bowel sounds, soft, non-distended, non-tender  Skin/Integumen: No rashes, no cyanosis, no edema noted in RLE though ACE wraps were on. Pain in entire posterior right calf. LLE unremarkable. Sensation intact in feet. Able to move feet b/l but not RLE off bed given calf pain.    Medications     Warfarin Therapy Reminder       NaCl 100 mL/hr at 05/25/17 1442       oxyCODONE  10 mg Oral Q12H     lisinopril  10 mg Oral Daily     warfarin  7.5 mg Oral ONCE at 18:00     enoxaparin  40 mg Subcutaneous Q12H     carvedilol (COREG) tablet 25 mg  25 mg Oral BID     omeprazole (priLOSEC) CR capsule 20 mg  20 mg Oral Daily     sodium chloride (PF)  3 mL Intracatheter Q8H     acetaminophen  975 mg Oral Q8H     senna-docusate  1-2 tablet Oral BID       Data     Recent Labs  Lab 05/25/17  1244 05/25/17  0640 05/24/17  0910   WBC  --  5.9  --    HGB  --  11.1* 12.4*   MCV  --  91  --    PLT  --  369 467*   INR 1.16*  --  0.97   NA  --  140  --    POTASSIUM  --  4.0 4.6   CHLORIDE  --  108  --    CO2  --  25  --    BUN  --  13  --    CR  --  1.09 1.32*   ANIONGAP  --  7  --    ALCIDES  --  8.0*  --    GLC  --  102*  --        No results found for this or any previous visit (from the past 24 hour(s)).

## 2017-05-25 NOTE — PLAN OF CARE
Problem: Goal Outcome Summary  Goal: Goal Outcome Summary  Outcome: Declining  POD 1 RTK. A/O, VSS, severe R calf pain, US positive for DVT, minimal po intake, moderate dark brown emesis at 1330. Due to void since rios dc'd this am, bladder scan for 163ml.Restarted IVF.  Hemovac dc'd, nicol chg done. Several meds for pain control, mainly calf pain, see emar. A1 w/ walker. Cont to monitor.

## 2017-05-25 NOTE — PLAN OF CARE
Problem: Goal Outcome Summary  Goal: Goal Outcome Summary  PT-  Per Nurse, pt is c/o significant calf pain this AM.  US to r/o DVT ordered.  PT deferred until results known.

## 2017-05-25 NOTE — PROGRESS NOTES
Vaibhav SIMON Bro  2017  POD #1    Clean wound without signs of infection.  Normal healing wound.  No excessive bleeding Has been refusing PT. Due to pain  Temperatures:  Current - Temp: 98.5  F (36.9  C); Max - Temp  Av.2  F (36.8  C)  Min: 97.6  F (36.4  C)  Max: 98.6  F (37  C)  Pulse range: Pulse  Av  Min: 80  Max: 92  Blood pressure range: Systolic (24hrs), Av , Min:103 , Max:157   ; Diastolic (24hrs), Av, Min:60, Max:98    CMS: intact bilaterlly  Labs: INR POD#2  Results for orders placed or performed during the hospital encounter of 17 (from the past 24 hour(s))   CBC with platelets   Result Value Ref Range    WBC 5.9 4.0 - 11.0 10e9/L    RBC Count 4.02 (L) 4.4 - 5.9 10e12/L    Hemoglobin 11.1 (L) 13.3 - 17.7 g/dL    Hematocrit 36.7 (L) 40.0 - 53.0 %    MCV 91 78 - 100 fl    MCH 27.6 26.5 - 33.0 pg    MCHC 30.2 (L) 31.5 - 36.5 g/dL    RDW 23.1 (H) 10.0 - 15.0 %    Platelet Count 369 150 - 450 10e9/L   Basic metabolic panel   Result Value Ref Range    Sodium 140 133 - 144 mmol/L    Potassium 4.0 3.4 - 5.3 mmol/L    Chloride 108 94 - 109 mmol/L    Carbon Dioxide 25 20 - 32 mmol/L    Anion Gap 7 3 - 14 mmol/L    Glucose 102 (H) 70 - 99 mg/dL    Urea Nitrogen 13 7 - 30 mg/dL    Creatinine 1.09 0.66 - 1.25 mg/dL    GFR Estimate 67 >60 mL/min/1.7m2    GFR Estimate If Black 81 >60 mL/min/1.7m2    Calcium 8.0 (L) 8.5 - 10.1 mg/dL   INR   Result Value Ref Range    INR 1.16 (H) 0.86 - 1.14       PLAN:  Deep venous thrombosis prophylaxis

## 2017-05-25 NOTE — PROGRESS NOTES
Vomited coffee grounfd  Emesis  Again    May  Need  Gi  To look  At      Iv  Fluids   iv  narcs    May have  To take a  Chance with  High  Dose lovenox  If cant  Hold  Warfarin  Down this  Miriam    Pulses  Are  Normal   In both  Legs  Swelling  ubstructed   Right  Slightly.

## 2017-05-25 NOTE — PROGRESS NOTES
Ortho    Ok  To  Go on   With  Pt  arom    Will  Start  Warfarin 7.5 mg  Today  Heparin  Not needed  And  Too risky  For the  Surgery      lovenox  To  bid

## 2017-05-26 ENCOUNTER — APPOINTMENT (OUTPATIENT)
Dept: OCCUPATIONAL THERAPY | Facility: CLINIC | Age: 67
DRG: 470 | End: 2017-05-26
Attending: ORTHOPAEDIC SURGERY
Payer: COMMERCIAL

## 2017-05-26 ENCOUNTER — APPOINTMENT (OUTPATIENT)
Dept: PHYSICAL THERAPY | Facility: CLINIC | Age: 67
DRG: 470 | End: 2017-05-26
Attending: ORTHOPAEDIC SURGERY
Payer: COMMERCIAL

## 2017-05-26 ENCOUNTER — TELEPHONE (OUTPATIENT)
Dept: CARDIOLOGY | Facility: CLINIC | Age: 67
End: 2017-05-26

## 2017-05-26 LAB
ANION GAP SERPL CALCULATED.3IONS-SCNC: 8 MMOL/L (ref 3–14)
BUN SERPL-MCNC: 23 MG/DL (ref 7–30)
CALCIUM SERPL-MCNC: 8 MG/DL (ref 8.5–10.1)
CHLORIDE SERPL-SCNC: 108 MMOL/L (ref 94–109)
CO2 SERPL-SCNC: 24 MMOL/L (ref 20–32)
CREAT SERPL-MCNC: 1.66 MG/DL (ref 0.66–1.25)
ERYTHROCYTE [DISTWIDTH] IN BLOOD BY AUTOMATED COUNT: 23.3 % (ref 10–15)
GFR SERPL CREATININE-BSD FRML MDRD: 41 ML/MIN/1.7M2
GLUCOSE SERPL-MCNC: 96 MG/DL (ref 70–99)
HCT VFR BLD AUTO: 32.7 % (ref 40–53)
HGB BLD-MCNC: 9.8 G/DL (ref 13.3–17.7)
HGB BLD-MCNC: NORMAL G/DL (ref 13.3–17.7)
INR PPP: 1.42 (ref 0.86–1.14)
MCH RBC QN AUTO: 28 PG (ref 26.5–33)
MCHC RBC AUTO-ENTMCNC: 30.3 G/DL (ref 31.5–36.5)
MCV RBC AUTO: 92 FL (ref 78–100)
PLATELET # BLD AUTO: 350 10E9/L (ref 150–450)
POTASSIUM SERPL-SCNC: 4.1 MMOL/L (ref 3.4–5.3)
RBC # BLD AUTO: 3.54 10E12/L (ref 4.4–5.9)
SODIUM SERPL-SCNC: 140 MMOL/L (ref 133–144)
UPPER GI ENDOSCOPY: NORMAL
WBC # BLD AUTO: 6.6 10E9/L (ref 4–11)

## 2017-05-26 PROCEDURE — 25000132 ZZH RX MED GY IP 250 OP 250 PS 637: Performed by: ORTHOPAEDIC SURGERY

## 2017-05-26 PROCEDURE — 97530 THERAPEUTIC ACTIVITIES: CPT | Mod: GO | Performed by: OCCUPATIONAL THERAPIST

## 2017-05-26 PROCEDURE — 80048 BASIC METABOLIC PNL TOTAL CA: CPT | Performed by: ORTHOPAEDIC SURGERY

## 2017-05-26 PROCEDURE — 97110 THERAPEUTIC EXERCISES: CPT | Mod: GP | Performed by: PHYSICAL THERAPIST

## 2017-05-26 PROCEDURE — 40000193 ZZH STATISTIC PT WARD VISIT: Performed by: PHYSICAL THERAPIST

## 2017-05-26 PROCEDURE — 97535 SELF CARE MNGMENT TRAINING: CPT | Mod: GO | Performed by: OCCUPATIONAL THERAPIST

## 2017-05-26 PROCEDURE — 97110 THERAPEUTIC EXERCISES: CPT | Mod: GP

## 2017-05-26 PROCEDURE — 25000128 H RX IP 250 OP 636: Performed by: INTERNAL MEDICINE

## 2017-05-26 PROCEDURE — 25000125 ZZHC RX 250: Performed by: INTERNAL MEDICINE

## 2017-05-26 PROCEDURE — 97116 GAIT TRAINING THERAPY: CPT | Mod: GP

## 2017-05-26 PROCEDURE — 25000132 ZZH RX MED GY IP 250 OP 250 PS 637: Performed by: PHYSICIAN ASSISTANT

## 2017-05-26 PROCEDURE — 25000125 ZZHC RX 250: Performed by: ORTHOPAEDIC SURGERY

## 2017-05-26 PROCEDURE — 40000133 ZZH STATISTIC OT WARD VISIT: Performed by: OCCUPATIONAL THERAPIST

## 2017-05-26 PROCEDURE — 85610 PROTHROMBIN TIME: CPT | Performed by: ORTHOPAEDIC SURGERY

## 2017-05-26 PROCEDURE — 97165 OT EVAL LOW COMPLEX 30 MIN: CPT | Mod: GO | Performed by: OCCUPATIONAL THERAPIST

## 2017-05-26 PROCEDURE — 40000193 ZZH STATISTIC PT WARD VISIT

## 2017-05-26 PROCEDURE — 0DJ08ZZ INSPECTION OF UPPER INTESTINAL TRACT, VIA NATURAL OR ARTIFICIAL OPENING ENDOSCOPIC: ICD-10-PCS | Performed by: INTERNAL MEDICINE

## 2017-05-26 PROCEDURE — G0500 MOD SEDAT ENDO SERVICE >5YRS: HCPCS | Performed by: INTERNAL MEDICINE

## 2017-05-26 PROCEDURE — 43235 EGD DIAGNOSTIC BRUSH WASH: CPT | Performed by: INTERNAL MEDICINE

## 2017-05-26 PROCEDURE — 25000128 H RX IP 250 OP 636: Performed by: ORTHOPAEDIC SURGERY

## 2017-05-26 PROCEDURE — 12000007 ZZH R&B INTERMEDIATE

## 2017-05-26 PROCEDURE — 36415 COLL VENOUS BLD VENIPUNCTURE: CPT | Performed by: ORTHOPAEDIC SURGERY

## 2017-05-26 PROCEDURE — 25000132 ZZH RX MED GY IP 250 OP 250 PS 637: Performed by: INTERNAL MEDICINE

## 2017-05-26 PROCEDURE — 99233 SBSQ HOSP IP/OBS HIGH 50: CPT | Performed by: INTERNAL MEDICINE

## 2017-05-26 PROCEDURE — 85027 COMPLETE CBC AUTOMATED: CPT | Performed by: ORTHOPAEDIC SURGERY

## 2017-05-26 PROCEDURE — 97116 GAIT TRAINING THERAPY: CPT | Mod: GP | Performed by: PHYSICAL THERAPIST

## 2017-05-26 RX ORDER — WARFARIN SODIUM 7.5 MG/1
7.5 TABLET ORAL
Status: COMPLETED | OUTPATIENT
Start: 2017-05-26 | End: 2017-05-26

## 2017-05-26 RX ORDER — LIDOCAINE 40 MG/G
CREAM TOPICAL
Status: CANCELLED | OUTPATIENT
Start: 2017-05-26

## 2017-05-26 RX ORDER — SODIUM CHLORIDE 9 MG/ML
INJECTION, SOLUTION INTRAVENOUS CONTINUOUS
Status: DISCONTINUED | OUTPATIENT
Start: 2017-05-26 | End: 2017-05-28

## 2017-05-26 RX ORDER — ACETAMINOPHEN 10 MG/ML
1000 INJECTION, SOLUTION INTRAVENOUS EVERY 6 HOURS PRN
Status: DISCONTINUED | OUTPATIENT
Start: 2017-05-26 | End: 2017-05-29 | Stop reason: HOSPADM

## 2017-05-26 RX ORDER — PANTOPRAZOLE SODIUM 40 MG/1
40 TABLET, DELAYED RELEASE ORAL
Status: DISCONTINUED | OUTPATIENT
Start: 2017-05-26 | End: 2017-05-29 | Stop reason: HOSPADM

## 2017-05-26 RX ORDER — FENTANYL CITRATE 50 UG/ML
INJECTION, SOLUTION INTRAMUSCULAR; INTRAVENOUS PRN
Status: DISCONTINUED | OUTPATIENT
Start: 2017-05-26 | End: 2017-05-26 | Stop reason: HOSPADM

## 2017-05-26 RX ADMIN — ACETAMINOPHEN 1000 MG: 10 INJECTION, SOLUTION INTRAVENOUS at 10:30

## 2017-05-26 RX ADMIN — PROCHLORPERAZINE EDISYLATE 5 MG: 5 INJECTION INTRAMUSCULAR; INTRAVENOUS at 19:00

## 2017-05-26 RX ADMIN — WARFARIN SODIUM 7.5 MG: 7.5 TABLET ORAL at 17:30

## 2017-05-26 RX ADMIN — ACETAMINOPHEN 1000 MG: 10 INJECTION, SOLUTION INTRAVENOUS at 18:30

## 2017-05-26 RX ADMIN — OXYCODONE HYDROCHLORIDE 10 MG: 10 TABLET, FILM COATED, EXTENDED RELEASE ORAL at 05:55

## 2017-05-26 RX ADMIN — SODIUM CHLORIDE: 9 INJECTION, SOLUTION INTRAVENOUS at 12:21

## 2017-05-26 RX ADMIN — PANTOPRAZOLE SODIUM 40 MG: 40 TABLET, DELAYED RELEASE ORAL at 17:30

## 2017-05-26 RX ADMIN — ENOXAPARIN SODIUM 40 MG: 40 INJECTION SUBCUTANEOUS at 23:55

## 2017-05-26 RX ADMIN — CARVEDILOL 25 MG: 25 TABLET, FILM COATED ORAL at 20:56

## 2017-05-26 RX ADMIN — ONDANSETRON 4 MG: 4 TABLET, ORALLY DISINTEGRATING ORAL at 17:30

## 2017-05-26 RX ADMIN — ACETAMINOPHEN 975 MG: 325 TABLET, FILM COATED ORAL at 00:23

## 2017-05-26 RX ADMIN — OMEPRAZOLE 20 MG: 20 CAPSULE, DELAYED RELEASE ORAL at 09:05

## 2017-05-26 ASSESSMENT — ACTIVITIES OF DAILY LIVING (ADL): PREVIOUS_RESPONSIBILITIES: MEAL PREP;LAUNDRY;SHOPPING;MEDICATION MANAGEMENT;FINANCES;DRIVING

## 2017-05-26 NOTE — TELEPHONE ENCOUNTER
Received short term disability forms addressed to . Not sure if this is related to pt's hospitalization for knee replacement. Pt currently hospitalized. Will call him next week to verify. If for his knee surgery, will need to be filled out by PMD or ortho MD, not cardiology. Karl WHITE

## 2017-05-26 NOTE — PROGRESS NOTES
Vaibhav Crabtree  2017  POD #  2    Less nausea  But  Concerned about  Coffee ground  Temperatures:  Current - Temp: 98.6  F (37  C); Max - Temp  Av.6  F (37  C)  Min: 98  F (36.7  C)  Max: 99.2  F (37.3  C)  Pulse range: Pulse  Av.5  Min: 92  Max: 93  Blood pressure range: Systolic (24hrs), Av , Min:97 , Max:130   ; Diastolic (24hrs), Av, Min:61, Max:71    CMS: intact  Labs:   Results for orders placed or performed during the hospital encounter of 17 (from the past 24 hour(s))   INR   Result Value Ref Range    INR 1.16 (H) 0.86 - 1.14   Hemoglobin   Result Value Ref Range    Hemoglobin  13.3 - 17.7 g/dL     Canceled, Test credited   Duplicate request  CORRECTED ON  AT 0745: PREVIOUSLY REPORTED AS 9.8     INR   Result Value Ref Range    INR 1.42 (H) 0.86 - 1.14   Basic metabolic panel   Result Value Ref Range    Sodium 140 133 - 144 mmol/L    Potassium 4.1 3.4 - 5.3 mmol/L    Chloride 108 94 - 109 mmol/L    Carbon Dioxide 24 20 - 32 mmol/L    Anion Gap 8 3 - 14 mmol/L    Glucose 96 70 - 99 mg/dL    Urea Nitrogen 23 7 - 30 mg/dL    Creatinine 1.66 (H) 0.66 - 1.25 mg/dL    GFR Estimate 41 (L) >60 mL/min/1.7m2    GFR Estimate If Black 50 (L) >60 mL/min/1.7m2    Calcium 8.0 (L) 8.5 - 10.1 mg/dL   CBC with platelets   Result Value Ref Range    WBC 6.6 4.0 - 11.0 10e9/L    RBC Count 3.54 (L) 4.4 - 5.9 10e12/L    Hemoglobin 9.8 (L) 13.3 - 17.7 g/dL    Hematocrit 32.7 (L) 40.0 - 53.0 %    MCV 92 78 - 100 fl    MCH 28.0 26.5 - 33.0 pg    MCHC 30.3 (L) 31.5 - 36.5 g/dL    RDW 23.3 (H) 10.0 - 15.0 %    Platelet Count 350 150 - 450 10e9/L       PLAN:  Discharge plan: home prob   now    Will see how he  Does  msay benefit  From  Home pt unless he  Tunes up

## 2017-05-26 NOTE — PLAN OF CARE
Problem: Goal Outcome Summary  Goal: Goal Outcome Summary  Outcome: No Change  Pt VSS on 1L. A & O, CMS intact, dressing c/d/i. 5/10 pain refuses narcotics, ice to knee and IV tylenol given. Report of nausea, zofran given x 1, sea bands applied. Regular diet ordered,poor appetite. RLE numbness. Voiding per urinal, up with 1 & walker. Will continue to monitor.

## 2017-05-26 NOTE — PLAN OF CARE
Problem: Goal Outcome Summary  Goal: Goal Outcome Summary  OT: Orders received, eval complete, tx initiated. Pt is a 67 yo M seen POD #2 of R TKA. Pt with R DVT 5/25/17. Pt lives in [facility] with 4 roommates and 8 stairs inside to bedroom and bathroom with tub shower. Pt reports has a walk-in shower in basement but prefers not to use. Pt reports prior I in all ADLs/IADLs with A from roommates for home mgmt. Pt reports niece and brother will be available to A on return home.     Surgeon Discharge Plan: none documented, per pt and PT note plans to d/c home with OPPT     Current Functional Status: min A for bed mobility and functional transfers, I with toileting, min A with LB dressing     Barriers to Plan/Home: pain, decreased activity tolerance, stairs in home, tub

## 2017-05-26 NOTE — PLAN OF CARE
Problem: Goal Outcome Summary  Goal: Goal Outcome Summary  Outcome: No Change  Patinet A&Ox4. VSS on RA. Numbness to RLE. Right pedal pulse dopplered. Dressing CDI. Up assist of 1, walker, gait belt. Voided this shift. Regular diet. No complaints of nausea. Pain 9/10 but refusing pain medications. Will continue to monitor.

## 2017-05-26 NOTE — CONSULTS
GASTROENTEROLOGY CONSULTATION      DATE OF SERVICE:  05/26/2017      CHIEF COMPLAINT:  Hematemesis.      HISTORY OF PRESENT ILLNESS:  Vaibhav Crabtree is a 66-year-old gentleman with a history of coronary artery disease, chronic systolic congestive heart failure, idiopathic cardiomyopathy, hypertension, dyslipidemia and osteoarthritis who underwent an elective right total knee arthroplasty on 05/24/2017.  He was being treated with Coumadin and he developed an acute episode of what sounded like coffee ground emesis.  He said he was only sipping water and a small amount of crackers.  He had not been hungry since his surgery and then he vomited.  He felt like it was black and he had not even had any coffee and maybe there was red in it.  He denies any abdominal pain, any chest pain.  He has not had any dysphagia or odynophagia.  Patient has no personal history of ulcer disease.  In review, the patient had been taking an aspirin a day until the 18th when he stopped it in preparation for his surgery.  He also had not been recently taking nonsteroidal anti-inflammatory agents.      The patient had his right total knee arthroplasty and then developed right calf pain postoperatively and the right lower extremity ultrasound on 05/25 was significant for a DVT.  He was on Lovenox and is being switched over to Coumadin for this DVT.  His INR today is 1.42.      Of note, this patient has had an iron deficiency anemia for some time and it appears he had an upper endoscopy 01/24/2017.  At that time, he was found to have irregular Z line but no evidence of esophagitis, a sliding hiatal hernia, and a deformity of the prepyloric region of the stomach, question of old ulcer disease.      The patient has also had a long history of iron deficiency anemia.  He reports he had a colonoscopy 1 year ago and it was normal.  I do not have any records of a small bowel evaluation for his iron deficiency.  He was given Procrit and injected for  infusions prior to surgery.      PAST MEDICAL HISTORY:  Significant for his idiopathic cardiomyopathy, congestive heart failure, hypertension, hyperlipidemia, coronary artery disease, peripheral vascular disease with claudication, mitral regurgitation, acute kidney injury, and his iron deficiency anemia.  He also has osteoarthritis.      PAST SURGICAL HISTORY:  Significant for right rotator cuff repair, removal of this tonsils, and left rotator cuff repair.      MEDICATIONS:  As an outpatient include lisinopril, carvedilol, and aspirin.      ALLERGIES:  No known drug allergies.      REVIEW OF SYSTEMS:   CONSTITUTIONAL:  The patient has not had fever or chills.   CARDIAC:  He denies chest pain or palpitations.   PULMONARY:  No shortness of breath.   GASTROINTESTINAL:  The patient has been nauseated ever since his surgery with difficulty taking p.o.   EXTREMITIES:  He has this new DVT and he has right leg pain relating from it.     NEUROLOGIC:  At this time, he has no dizziness or lightheadedness.  All other systems were negative.      SOCIAL HISTORY:  He is a former smoker, smoked about 4 packs a day for 15 years, quit in 1971.  Denies alcohol.      FAMILY HISTORY:  Negative for colon cancer.      PHYSICAL EXAMINATION:   GENERAL:  He is a well-nourished gentleman, awake and alert, in no apparent distress.  He has his right knee is bandaged.  He reports it is painful to move it.   HEENT:  Head is atraumatic, normocephalic.   SKIN:  Without evidence of jaundice or rash.  Sclerae nonicteric.   HEART:  S1, S2.   LUNGS:  Clear to auscultation with good bilateral air movement.   ABDOMEN:  Soft and nondistended, nontender to palpation.  No hepatosplenomegaly, no masses.   EXTREMITIES:  Right extremity is bandaged and not examined, left extremity appears normal.   NEUROLOGIC:  He is alert and oriented, grossly is nonfocal.   PSYCHIATRIC:  No evidence of pressured speech or flattened affect.      LABORATORY DATA:   Electrolytes are within normal limits.  His creatinine is slightly elevated at 1.66, BUN is 23, white blood cell count is normal, hemoglobin fell from 11.1 yesterday to 9.8 today.  His MCV is 92.  His INR is 1.42.      ASSESSMENT AND PLAN:  Hematemesis.  The patient has new onset hematemesis and is on anticoagulation.  He will need long-term anticoagulation with his history of a DVT.  I think upper endoscopy for further evaluation is warranted and this will be done as soon as possible.  We would be able to be cauterized or band a potentially bleeding site if needed.  In the meantime, he will be watched very closely with serial hemoglobins on a PPI.      Thank you for asking us to participate in his care.         HARSHAD GOLDSTEIN MD             D: 2017 12:57   T: 2017 18:21   MT: MARGY      Name:     DAMIAN MEESK   MRN:      [id number]        Account:       [id number]   :      1950           Consult Date:  2017      Document: [id number]

## 2017-05-26 NOTE — PROGRESS NOTES
[facility]    Hospitalist Progress Note    Assessment & Plan   Vaibhav Crabtree is a 66 year old male with PMHx of CAD, chronic systolic CHF, idiopathic cardiomyopathy, hypertension, dyslipidemia, iron deficiency anemia, stage III CKD and osteoarthritis who underwent an elective right total knee arthroplasty per Dr. Iyer on 5/24/2017. Hospitalist service was consulted for postoperative medical management.     S/p R TKA on 5/24/17: POD #2  Routine postop cares per ortho including pain control and DVT ppx  Therapies per protocol  Has scheduled bowel regimen  Encouraged pulmonary toilet    RLE Postop DVT:  Patient c/o pain in right calf postoperatively. RLE US on 5/25 was positive for an occlusive DVT in one of the posterior tibial veins and two soleal veins. Has hx of DVT in RLE.   -- anticoagulation per ortho --> presently on Lovenox 40mg BID and starting warfarin   -- ortho is managing warfarin, INR 1.42 today    Iron Deficiency Anemia, now with acute blood loss anemia:  This was diagnosed on his preop evaluation. Received Procrit and Injectafer infusions prior to surgery. Hgb 12.4 preop.   -- hgb 12.4 -- 11.1 -- 9.8 today  -- reported episode of possible coffee-ground emesis yesterday -- nauseated today -- have placed consult to GI  -- cont PPI  -- cont monitoring hgb daily given need for anticoagulation with DVT        Hypertension:   PTA meds: Coreg 25mg BID and lisinopril 10mg daily -- both resumed postop w/hold parameters  -- BPs soft this morning and Cr trending up -- hold lisinopril, cont Coreg if able  -- gentle IVFs today    Stage III CKD:  Baseline Cr is around 1.2.   -- Cr initially stable postop, trended up to 1.6 on POD #2  -- repeat BMP tomorrow     Coronary artery disease:  Idiopathic Cardiomyopathy  Chronic Systolic CHF:  With CTA from 01/2015 showing 50% to 70% stenosis in the LAD distribution. Cardiomyopathy diagnosed in 2014. Follows with Dr. Asif. Most recent EF 50-55% on  "echo in 1/2016. Lasix reportedly dc'd by PCP.   -- no s/sx to suggest volume overload  -- Coreg/lisinopril as above  -- PTA full dose aspirin now on hold while on anticoagulation     FEN: cont NS @50ml/h, lytes stable, ADAT  DVT Prophylaxis: Lovenox, warfarin  Code Status: Full Code    Disposition: Per ortho recs    Farnaz Morevitor Mosqueda    Interval History   Reported episode of possible hematemesis yesterday (patient tells me it \"looked red\") but no additional episodes of emesis since. Endorsed feeling nauseated this morning but did not vomit. Unable to tolerate pills. Has had few crackers. Complains of pain in his right leg. Denies cp/sob/cough.     -Data reviewed today: I reviewed all new labs and imaging results over the last 24 hours. I personally reviewed no images or EKG's today.    Physical Exam   Temp: 98.6  F (37  C) Temp src: Oral BP: 107/68 Pulse: 93 Heart Rate: 66 Resp: 12 SpO2: 90 % O2 Device: None (Room air) Oxygen Delivery: 2 LPM  Vitals:    05/24/17 0857   Weight: 75.3 kg (166 lb)     Vital Signs with Ranges  Temp:  [98  F (36.7  C)-99.2  F (37.3  C)] 98.6  F (37  C)  Pulse:  [92-93] 93  Heart Rate:  [66-93] 66  Resp:  [12-18] 12  BP: ()/(61-71) 107/68  SpO2:  [89 %-91 %] 90 %  I/O last 3 completed shifts:  In: 2395 [P.O.:810; I.V.:1585]  Out: 750 [Urine:500; Emesis/NG output:250]    Constitutional: Resting comfortably, alert and conversing appropriately, NAD  Respiratory: CTAB, no wheeze/rales/rhonchi  Cardiovascular: HRRR, no MGR  GI: S, NT, ND, +BS  Skin/Integumen: warm/dry  Other: No LE edema    Medications     Warfarin Therapy Reminder       NaCl 100 mL/hr at 05/25/17 2204       oxyCODONE  10 mg Oral Q12H     lisinopril  10 mg Oral Daily     enoxaparin  40 mg Subcutaneous Q12H     carvedilol (COREG) tablet 25 mg  25 mg Oral BID     omeprazole (priLOSEC) CR capsule 20 mg  20 mg Oral Daily     sodium chloride (PF)  3 mL Intracatheter Q8H     acetaminophen  975 mg Oral Q8H     " senna-docusate  1-2 tablet Oral BID       Data     Recent Labs  Lab 05/26/17  0655 05/25/17  1244 05/25/17  0640 05/24/17  0910   WBC 6.6  --  5.9  --    HGB 9.8*  Canceled, Test credited Duplicate requestCORRECTED ON 05/26 AT 0745: PREVIOUSLY REPORTED AS 9.8  --  11.1* 12.4*   MCV 92  --  91  --      --  369 467*   INR 1.42* 1.16*  --  0.97     --  140  --    POTASSIUM 4.1  --  4.0 4.6   CHLORIDE 108  --  108  --    CO2 24  --  25  --    BUN 23  --  13  --    CR 1.66*  --  1.09 1.32*   ANIONGAP 8  --  7  --    ALICDES 8.0*  --  8.0*  --    GLC 96  --  102*  --        No results found for this or any previous visit (from the past 24 hour(s)).

## 2017-05-26 NOTE — PROCEDURES
GI NOTE (see procedure note)    Esophageal ulcerations multiple, no bleeding  Hiatal hernia  Stomach no blood    PPI IV until taking po then high dose for 2 months  Control nausea and vomiting  Repeat EGD in 2 months to check healing.  Low risk of rebleed okay for coumadin    Renae Mendosa MD  [facility]  Office

## 2017-05-26 NOTE — PROGRESS NOTES
05/26/17 0833   Quick Adds   Type of Visit Initial Occupational Therapy Evaluation   Living Environment   Lives With friend(s)  (4 roommates)   Living Arrangements ([facility])   Home Accessibility stairs within home;bed and bath are not on the first floor   Number of Stairs Within Home 8   Stair Railings at Home inside, present on left side   Transportation Available car;family or friend will provide  (pt drives at baseline, reports family A for now)   Living Environment Comment Pt lives in [facility] with 4 renters. Pt has walk-in shower in basement but shower he uses is a tub on upper level   Self-Care   Dominant Hand right   Usual Activity Tolerance good   Current Activity Tolerance poor   Regular Exercise yes   Activity/Exercise Type walking   Exercise Amount/Frequency daily   Equipment Currently Used at Home none   Functional Level Prior   Ambulation 0-->independent   Transferring 0-->independent   Toileting 0-->independent   Bathing 0-->independent   Dressing 0-->independent   Eating 0-->independent   Communication 0-->understands/communicates without difficulty   Swallowing 0-->swallows foods/liquids without difficulty   Cognition 0 - no cognition issues reported   Fall history within last six months no   Which of the above functional risks had a recent onset or change? ambulation;transferring;toileting;bathing;dressing   Prior Functional Level Comment Pt reports prior I with all ADLs   General Information   Onset of Illness/Injury or Date of Surgery - Date 05/24/17   Referring Physician Marco A Iyer MD   Additional Occupational Profile Info/Pertinent History of Current Problem Pt is a 65 yo M seen POD #2 of R TKA. Pt found to have DVT in R calf 5/25/17.   Weight-Bearing Status - RLE weight-bearing as tolerated   Cognitive Status Examination   Orientation orientation to person, place and time   Level of Consciousness alert   Able to Follow Commands WNL/WFL   Personal Safety (Cognitive) WNL/WFL   Visual  Perception   Visual Perception Wears glasses   Sensory Examination   Sensory Comments numbness in R LE   Pain Assessment   Patient Currently in Pain (3/10 at rest, 10/10 with activity)   Mobility   Bed Mobility Bed mobility skill: Rolling/Turning;Bed mobility skill: Scooting/Bridging;Bed mobility skill: Sit to supine;Bed mobility skill: Supine to sit;Bed mobility analysis   Bed Mobility Skill: Rolling/Turning   Level of [place] - Bed Mobility Skill Rolling Turning minimum assist (75% patients effort)   Assistive Device:  Rolling/Turning bed rails   Bed Mobility Skill: Scooting/Bridging   Level of [place]: Scooting/Bridging minimum assist (75% patients effort)   Assistive Device: Scooting/Bridging bedrail   Bed Mobility Skill: Sit to Supine   Level of [place]: Sit/Supine minimum assist (75% patients effort)   Assistive Device: Sit/Supine bedrail   Bed Mobility Skill: Supine to Sit   Level of [place]: Supine/Sit minimum assist (75% patients effort)   Assistive Device: Supine/Sit bedrail   Bed Mobility Analysis   Bed Mobility Limitations decreased ability to use legs for bridging/pushing   Impairments Contributing to Impaired Bed Mobility pain;decreased ROM;decreased strength   Transfer Skills   Transfer Transfer Skill: Stand to Sit;Transfer Safety Analysis Sit/Stand;Transfer Safety Analysis Bed/Chair   Transfer Skill: Bed to Chair/Chair to Bed   Level of [place]: Bed to Chair contact guard   Weight-Bearing Restrictions weight-bearing as tolerated   Assistive Device - Transfer Skill Bed to Chair Chair to Bed Rehab Eval standard walker   Transfer Safety Analysis Bed/Chair   Transfer Safety Concerns Noted decreased step length;decreased weight-shifting ability   Impairments Contributing to Impaired Transfers pain;decreased ROM;decreased strength   Transfer Skill: Sit to Stand   Level of [place]: Sit/Stand minimum assist (75% patients effort)   Transfer Skill: Sit to Stand weight-bearing as  tolerated   Assistive Device for Transfer: Sit/Stand standard walker   Transfer Safety Analysis Sit/Stand   Impaired Transfers: Sit/Stand pain;decreased ROM;decreased strength   Toilet Transfer   Toilet Transfer Toilet Transfer Skill;Toilet Transfer Safety Analysis   Transfer Skill: Toilet Transfer   Level of [place]: Toilet minimum assist (75% patients effort)   Weight-Bearing Restrictions: Toilet weight-bearing as tolerated   Assistive Device standard walker;grab bars   Transfer Safety Analysis Toilet   Transfer Safety Concerns Noted: Toilet losing balance backward   Transfer Safety Analysis Toilet pain;decreased ROM;decreased strength;impaired balance   Lower Body Dressing   Level of [place]: Dress Lower Body minimum assist (75% patients effort)   Assistive Device reacher;sock-aid   Toileting   Level of [place]: Toilet independent   Instrumental Activities of Daily Living (IADL)   Previous Responsibilities meal prep;laundry;shopping;medication management;finances;driving   IADL Comments Pt reports prior I in all IADLs, has A from roommates for home mgmt   Activities of Daily Living Analysis   Impairments Contributing to Impaired Activities of Daily Living pain;ROM decreased;strength decreased;balance impaired   General Therapy Interventions   Planned Therapy Interventions ADL retraining;transfer training   Clinical Impression   Criteria for Skilled Therapeutic Interventions Met yes, treatment indicated   OT Diagnosis decreased I and safety in ADLs   Influenced by the following impairments decreased strength, activity tolerance, ROM of R LE, and balance, pain   Assessment of Occupational Performance 1-3 Performance Deficits   Identified Performance Deficits bathing, dressing, functional transfers   Clinical Decision Making (Complexity) Low complexity   Therapy Frequency daily   Predicted Duration of Therapy Intervention (days/wks) 3 days   Anticipated Discharge Disposition (per surgeon)   Risks and  "Benefits of Treatment have been explained. Yes   Patient, Family & other staff in agreement with plan of care Yes   [facility] TM \"6 Clicks\"   2016, Trustees of [facility], under license to [employer].  All rights reserved.   6 Clicks Short Forms Daily Activity Inpatient Short Form   [facility] AM-PAC  \"6 Clicks\" Daily Activity Inpatient Short Form   1. Putting on and taking off regular lower body clothing? 3 - A Little   2. Bathing (including washing, rinsing, drying)? 3 - A Little   3. Toileting, which includes using toilet, bedpan or urinal? 4 - None   4. Putting on and taking off regular upper body clothing? 4 - None   5. Taking care of personal grooming such as brushing teeth? 4 - None   6. Eating meals? 4 - None   Daily Activity Raw Score (Score out of 24.Lower scores equate to lower levels of function) 22   Total Evaluation Time   Total Evaluation Time (Minutes) 10     "

## 2017-05-26 NOTE — PLAN OF CARE
Problem: Goal Outcome Summary  Goal: Goal Outcome Summary  DC Planner PT   Patient plan for D/C: Per surgeon, discharge home Sunday with Home PT pending progress.   Current status: Patient reported R knee pain of 8/10. Patient performed supine <> sit, Isabela. Sit <> stand with FWW and CGA. Patient ambulated 50 feet with FWW and navigated 3 stairs with B railings, CGA. R knee ROM: 6-67 degrees.   Barriers to return to prior living situation: Stairs to enter/exit house.  Recommendations for D/C: Home with Home PT and assist to navigate stairs.   Rationale for D/C recommendations: Patient currently requires assist to navigate stairs. Patient will benefit from Home PT prior to OP PT to allow time to increase independence with functional mobility.        Entered by: Sadia Epstein 05/26/2017 2:05 PM

## 2017-05-27 ENCOUNTER — APPOINTMENT (OUTPATIENT)
Dept: OCCUPATIONAL THERAPY | Facility: CLINIC | Age: 67
DRG: 470 | End: 2017-05-27
Attending: ORTHOPAEDIC SURGERY
Payer: COMMERCIAL

## 2017-05-27 ENCOUNTER — APPOINTMENT (OUTPATIENT)
Dept: PHYSICAL THERAPY | Facility: CLINIC | Age: 67
DRG: 470 | End: 2017-05-27
Attending: ORTHOPAEDIC SURGERY
Payer: COMMERCIAL

## 2017-05-27 LAB
ANION GAP SERPL CALCULATED.3IONS-SCNC: 8 MMOL/L (ref 3–14)
BUN SERPL-MCNC: 15 MG/DL (ref 7–30)
CALCIUM SERPL-MCNC: 8.3 MG/DL (ref 8.5–10.1)
CHLORIDE SERPL-SCNC: 107 MMOL/L (ref 94–109)
CO2 SERPL-SCNC: 26 MMOL/L (ref 20–32)
CREAT SERPL-MCNC: 1.25 MG/DL (ref 0.66–1.25)
GFR SERPL CREATININE-BSD FRML MDRD: 57 ML/MIN/1.7M2
GLUCOSE SERPL-MCNC: 82 MG/DL (ref 70–99)
HGB BLD-MCNC: 9.5 G/DL (ref 13.3–17.7)
INR PPP: 2.51 (ref 0.86–1.14)
INR PPP: 2.54 (ref 0.86–1.14)
PLATELET # BLD AUTO: 318 10E9/L (ref 150–450)
POTASSIUM SERPL-SCNC: 3.7 MMOL/L (ref 3.4–5.3)
SODIUM SERPL-SCNC: 141 MMOL/L (ref 133–144)

## 2017-05-27 PROCEDURE — 40000133 ZZH STATISTIC OT WARD VISIT: Performed by: OCCUPATIONAL THERAPY ASSISTANT

## 2017-05-27 PROCEDURE — 40000193 ZZH STATISTIC PT WARD VISIT: Performed by: PHYSICAL THERAPY ASSISTANT

## 2017-05-27 PROCEDURE — 12000007 ZZH R&B INTERMEDIATE

## 2017-05-27 PROCEDURE — 97110 THERAPEUTIC EXERCISES: CPT | Mod: GP | Performed by: PHYSICAL THERAPY ASSISTANT

## 2017-05-27 PROCEDURE — 36415 COLL VENOUS BLD VENIPUNCTURE: CPT | Performed by: ORTHOPAEDIC SURGERY

## 2017-05-27 PROCEDURE — 97530 THERAPEUTIC ACTIVITIES: CPT | Mod: GO | Performed by: OCCUPATIONAL THERAPY ASSISTANT

## 2017-05-27 PROCEDURE — 25000132 ZZH RX MED GY IP 250 OP 250 PS 637: Performed by: PHYSICIAN ASSISTANT

## 2017-05-27 PROCEDURE — 85018 HEMOGLOBIN: CPT | Performed by: ORTHOPAEDIC SURGERY

## 2017-05-27 PROCEDURE — 25000132 ZZH RX MED GY IP 250 OP 250 PS 637: Performed by: INTERNAL MEDICINE

## 2017-05-27 PROCEDURE — 25000128 H RX IP 250 OP 636: Performed by: ORTHOPAEDIC SURGERY

## 2017-05-27 PROCEDURE — 99232 SBSQ HOSP IP/OBS MODERATE 35: CPT | Performed by: INTERNAL MEDICINE

## 2017-05-27 PROCEDURE — 80048 BASIC METABOLIC PNL TOTAL CA: CPT | Performed by: ORTHOPAEDIC SURGERY

## 2017-05-27 PROCEDURE — 25000132 ZZH RX MED GY IP 250 OP 250 PS 637: Performed by: ORTHOPAEDIC SURGERY

## 2017-05-27 PROCEDURE — 85610 PROTHROMBIN TIME: CPT | Performed by: ORTHOPAEDIC SURGERY

## 2017-05-27 PROCEDURE — 97116 GAIT TRAINING THERAPY: CPT | Mod: GP | Performed by: PHYSICAL THERAPY ASSISTANT

## 2017-05-27 PROCEDURE — 85049 AUTOMATED PLATELET COUNT: CPT | Performed by: ORTHOPAEDIC SURGERY

## 2017-05-27 RX ORDER — TRAMADOL HYDROCHLORIDE 50 MG/1
50 TABLET ORAL EVERY 6 HOURS PRN
Status: DISCONTINUED | OUTPATIENT
Start: 2017-05-27 | End: 2017-05-29 | Stop reason: HOSPADM

## 2017-05-27 RX ORDER — TRAMADOL HYDROCHLORIDE 50 MG/1
50 TABLET ORAL EVERY 6 HOURS PRN
Qty: 80 TABLET | Refills: 0 | Status: SHIPPED | OUTPATIENT
Start: 2017-05-27 | End: 2017-06-05

## 2017-05-27 RX ORDER — WARFARIN SODIUM 7.5 MG/1
7.5 TABLET ORAL
Status: DISCONTINUED | OUTPATIENT
Start: 2017-05-27 | End: 2017-05-27

## 2017-05-27 RX ORDER — PANTOPRAZOLE SODIUM 40 MG/1
40 TABLET, DELAYED RELEASE ORAL
Qty: 60 TABLET | Refills: 0 | Status: SHIPPED | OUTPATIENT
Start: 2017-05-27 | End: 2017-06-05

## 2017-05-27 RX ADMIN — PANTOPRAZOLE SODIUM 40 MG: 40 TABLET, DELAYED RELEASE ORAL at 17:16

## 2017-05-27 RX ADMIN — ACETAMINOPHEN 975 MG: 325 TABLET, FILM COATED ORAL at 01:32

## 2017-05-27 RX ADMIN — ACETAMINOPHEN 975 MG: 325 TABLET, FILM COATED ORAL at 08:35

## 2017-05-27 RX ADMIN — CYCLOBENZAPRINE HYDROCHLORIDE 5 MG: 5 TABLET, FILM COATED ORAL at 03:39

## 2017-05-27 RX ADMIN — PANTOPRAZOLE SODIUM 40 MG: 40 TABLET, DELAYED RELEASE ORAL at 08:35

## 2017-05-27 RX ADMIN — CARVEDILOL 25 MG: 25 TABLET, FILM COATED ORAL at 08:34

## 2017-05-27 RX ADMIN — CARVEDILOL 25 MG: 25 TABLET, FILM COATED ORAL at 20:23

## 2017-05-27 RX ADMIN — ENOXAPARIN SODIUM 40 MG: 40 INJECTION SUBCUTANEOUS at 11:29

## 2017-05-27 RX ADMIN — ENOXAPARIN SODIUM 40 MG: 40 INJECTION SUBCUTANEOUS at 22:34

## 2017-05-27 RX ADMIN — ACETAMINOPHEN 650 MG: 325 TABLET, FILM COATED ORAL at 17:16

## 2017-05-27 NOTE — PLAN OF CARE
Problem: Goal Outcome Summary  Goal: Goal Outcome Summary  Outcome: Improving  Patient is A&O, VSS on CMS intact, regular diet, up with assist of 1. Voiding adequately in urinal, IV Tylenol for pain control, and dressing CDI. Will continue to monitor

## 2017-05-27 NOTE — PROGRESS NOTES
[facility]    Hospitalist Progress Note    Assessment & Plan   Vaibhav Crabtree is a 66 year old male with PMHx of CAD, chronic systolic CHF, idiopathic cardiomyopathy, hypertension, dyslipidemia, iron deficiency anemia, stage III CKD and osteoarthritis who underwent an elective right total knee arthroplasty per Dr. Iyer on 5/24/2017. Hospitalist service was consulted for postoperative medical management.     S/p R TKA on 5/24/17: POD #3  Routine postop cares per ortho including pain control and DVT ppx  Therapies per protocol  Has scheduled bowel regimen  Encouraged pulmonary toilet    RLE Postop DVT:  Patient c/o pain in right calf postoperatively. RLE US on 5/25 was positive for an occlusive DVT in one of the posterior tibial veins and two soleal veins. Has hx of DVT in RLE.   -- anticoagulation per ortho --> presently on Lovenox 40mg BID and warfarin   -- ortho is managing warfarin, INR 2.51 today    Iron Deficiency Anemia, now with acute blood loss anemia:  This was diagnosed on his preop evaluation. Received Procrit and Injectafer infusions prior to surgery. Hgb 12.4 preop.   -- hgb 12.4 -- 11.1 -- 9.8 on 5/26  -- reported episode of possible coffee-ground emesis on 5/25 -- seen by GI and had EGD on 5/26 which showed multiple esophageal ulcerations and hiatal hernia  -- cont high dose PPI x2 mths -- will need repeat EGD in 2 mths  -- hgb stable at 9.5 today      Hypertension:   PTA meds: Coreg 25mg BID and lisinopril 10mg daily -- both initially resumed postop w/hold parameters  -- cont Coreg  -- holding Lisinopril and Lasix for now (anticipate resumption at discharge)    Stage III CKD:  Baseline Cr is around 1.2.   -- Cr initially stable postop, trended up to 1.6 on POD #2 -- returned to baseline by POD #3 after gentle IVFs and holding lisinopril     Coronary artery disease:  Idiopathic Cardiomyopathy  Chronic Systolic CHF:  With CTA from 01/2015 showing 50% to 70% stenosis in the LAD  distribution. Cardiomyopathy diagnosed in 2014. Follows with Dr. Asif. Most recent EF 50-55% on echo in 1/2016. Lasix reportedly dc'd by PCP.   -- no s/sx to suggest volume overload  -- cont Coreg, holding lisinopril for now  -- PTA full dose aspirin now on hold while on anticoagulation     FEN: dc IVFs, lytes stable, ADAT  DVT Prophylaxis: Lovenox, warfarin  Code Status: Full Code    Disposition: Per ortho, likely discharge home tomorrow.    Farnaz Mosqueda    Interval History   Seen this afternoon. Eating/drinking okay. Denies complaints. Passing flatus. Pain well controlled.    -Data reviewed today: I reviewed all new labs and imaging results over the last 24 hours. I personally reviewed no images or EKG's today.    Physical Exam   Temp: 98.4  F (36.9  C) Temp src: Oral BP: 129/75 Pulse: 91 Heart Rate: 89 Resp: 16 SpO2: 93 % O2 Device: None (Room air) Oxygen Delivery: 2 LPM  Vitals:    05/24/17 0857   Weight: 75.3 kg (166 lb)     Vital Signs with Ranges  Temp:  [98  F (36.7  C)-98.6  F (37  C)] 98.4  F (36.9  C)  Pulse:  [91] 91  Heart Rate:  [82-92] 89  Resp:  [8-28] 16  BP: (110-153)/(64-86) 129/75  SpO2:  [93 %-98 %] 93 %  I/O last 3 completed shifts:  In: 440 [P.O.:440]  Out: 550 [Urine:550]    Constitutional: Resting comfortably, alert and conversing appropriately, NAD  Respiratory: CTAB, no wheeze/rales/rhonchi  Cardiovascular: HRRR, no MGR  GI: S, NT, ND, +BS   Skin/Integumen: warm/dry  Other: No LE edema    Medications     NaCl 50 mL/hr at 05/26/17 1221     Warfarin Therapy Reminder         warfarin  7.5 mg Oral ONCE at 18:00     pantoprazole  40 mg Oral BID AC     enoxaparin  40 mg Subcutaneous Q12H     carvedilol (COREG) tablet 25 mg  25 mg Oral BID     sodium chloride (PF)  3 mL Intracatheter Q8H     acetaminophen  975 mg Oral Q8H     senna-docusate  1-2 tablet Oral BID       Data     Recent Labs  Lab 05/27/17  0700 05/26/17  0655 05/25/17  1244 05/25/17  0640   WBC  --  6.6  --  5.9   HGB  9.5* 9.8*  Canceled, Test credited Duplicate requestCORRECTED ON 05/26 AT 0745: PREVIOUSLY REPORTED AS 9.8  --  11.1*   MCV  --  92  --  91    350  --  369   INR 2.51* 1.42* 1.16*  --     140  --  140   POTASSIUM 3.7 4.1  --  4.0   CHLORIDE 107 108  --  108   CO2 26 24  --  25   BUN 15 23  --  13   CR 1.25 1.66*  --  1.09   ANIONGAP 8 8  --  7   ALCIDES 8.3* 8.0*  --  8.0*   GLC 82 96  --  102*       No results found for this or any previous visit (from the past 24 hour(s)).

## 2017-05-27 NOTE — PLAN OF CARE
Problem: Goal Outcome Summary  Goal: Goal Outcome Summary  DC Planner OT   Patient plan for D/C: Home  Current status: Pt completed toilet transfer with use of end of sink for support mod I.  Unable to complete tub transfer due to pt has shower doors on his tub and not able to remove. Pt states he will sponge bathe until able to step over.    Barriers to return to prior living situation: stairs, tub with shower doors  Recommendations for D/C: home  Rationale for D/C recommendations: pt would benefit from home PT to advance independence within home.        Entered by: Melvina Mead 05/27/2017 10:44 AM

## 2017-05-27 NOTE — PROVIDER NOTIFICATION
Spoke with  in regards to INR & enoxaparin. Ordered last dose of enoxaparin 2359 May 26,2017. Coumadin will be restarted tomorrow.

## 2017-05-27 NOTE — PLAN OF CARE
Problem: Goal Outcome Summary  Goal: Goal Outcome Summary  DC Planner PT   Patient plan for D/C: Per surgeon, discharge home Sunday with Home PT pending progress.   Current status: Pt performed bed mobility with min assist and sit to/from stand transfers with SBA.  Pt performed gait training x 100 ft using wheeled walker and CGA.  Slow pace.  KI worn during gait with good stability noted.  Pt declined trial of stairs. Knee AAROM is 9-77 degrees.  Barriers to return to prior living situation: Per surgeon, discharge home Sunday with Home PT pending progress.   Recommendations for D/C: Home with Home PT and assist to navigate stairs.   Rationale for D/C recommendations: Patient currently requires assist to navigate stairs. Patient will benefit from Home PT prior to OP PT to allow time to increase independence with functional mobility.       Entered by: Mariah Singh 05/27/2017 10:34 AM

## 2017-05-27 NOTE — PLAN OF CARE
Problem: Goal Outcome Summary  Goal: Goal Outcome Summary  Outcome: Improving  Pt VSS on RA. IV saline locked, CMS intact, dressing c/d/i. Voiding adequately in urinal. Tolerating regular diet, no report of nausea this shift. Taking scheduled tylenol for pain. Plan is to discharge to home tomorrow. Progressing per plan of care.

## 2017-05-28 ENCOUNTER — APPOINTMENT (OUTPATIENT)
Dept: OCCUPATIONAL THERAPY | Facility: CLINIC | Age: 67
DRG: 470 | End: 2017-05-28
Attending: ORTHOPAEDIC SURGERY
Payer: COMMERCIAL

## 2017-05-28 ENCOUNTER — APPOINTMENT (OUTPATIENT)
Dept: PHYSICAL THERAPY | Facility: CLINIC | Age: 67
DRG: 470 | End: 2017-05-28
Attending: ORTHOPAEDIC SURGERY
Payer: COMMERCIAL

## 2017-05-28 LAB
CREAT SERPL-MCNC: 0.94 MG/DL (ref 0.66–1.25)
GFR SERPL CREATININE-BSD FRML MDRD: 79 ML/MIN/1.7M2
INR PPP: 1.62 (ref 0.86–1.14)

## 2017-05-28 PROCEDURE — 97535 SELF CARE MNGMENT TRAINING: CPT | Mod: GO | Performed by: OCCUPATIONAL THERAPY ASSISTANT

## 2017-05-28 PROCEDURE — 82565 ASSAY OF CREATININE: CPT | Performed by: ORTHOPAEDIC SURGERY

## 2017-05-28 PROCEDURE — 25000132 ZZH RX MED GY IP 250 OP 250 PS 637: Performed by: PHYSICIAN ASSISTANT

## 2017-05-28 PROCEDURE — 25000132 ZZH RX MED GY IP 250 OP 250 PS 637: Performed by: ORTHOPAEDIC SURGERY

## 2017-05-28 PROCEDURE — 25000132 ZZH RX MED GY IP 250 OP 250 PS 637: Performed by: INTERNAL MEDICINE

## 2017-05-28 PROCEDURE — 99231 SBSQ HOSP IP/OBS SF/LOW 25: CPT | Performed by: INTERNAL MEDICINE

## 2017-05-28 PROCEDURE — 12000007 ZZH R&B INTERMEDIATE

## 2017-05-28 PROCEDURE — 25000128 H RX IP 250 OP 636: Performed by: ORTHOPAEDIC SURGERY

## 2017-05-28 PROCEDURE — 85610 PROTHROMBIN TIME: CPT | Performed by: ORTHOPAEDIC SURGERY

## 2017-05-28 PROCEDURE — 40000193 ZZH STATISTIC PT WARD VISIT: Performed by: PHYSICAL THERAPY ASSISTANT

## 2017-05-28 PROCEDURE — 36415 COLL VENOUS BLD VENIPUNCTURE: CPT | Performed by: ORTHOPAEDIC SURGERY

## 2017-05-28 PROCEDURE — 40000133 ZZH STATISTIC OT WARD VISIT: Performed by: OCCUPATIONAL THERAPY ASSISTANT

## 2017-05-28 PROCEDURE — 97110 THERAPEUTIC EXERCISES: CPT | Mod: GP | Performed by: PHYSICAL THERAPY ASSISTANT

## 2017-05-28 PROCEDURE — 97116 GAIT TRAINING THERAPY: CPT | Mod: GP | Performed by: PHYSICAL THERAPY ASSISTANT

## 2017-05-28 RX ORDER — WARFARIN SODIUM 6 MG/1
6 TABLET ORAL
Status: COMPLETED | OUTPATIENT
Start: 2017-05-28 | End: 2017-05-28

## 2017-05-28 RX ORDER — AMOXICILLIN 250 MG
1-2 CAPSULE ORAL 2 TIMES DAILY
Qty: 100 TABLET | Refills: 0 | Status: SHIPPED | OUTPATIENT
Start: 2017-05-28 | End: 2017-06-05

## 2017-05-28 RX ORDER — LISINOPRIL 10 MG/1
10 TABLET ORAL DAILY
Status: DISCONTINUED | OUTPATIENT
Start: 2017-05-28 | End: 2017-05-29 | Stop reason: HOSPADM

## 2017-05-28 RX ADMIN — ENOXAPARIN SODIUM 40 MG: 40 INJECTION SUBCUTANEOUS at 11:55

## 2017-05-28 RX ADMIN — ACETAMINOPHEN 650 MG: 325 TABLET, FILM COATED ORAL at 08:38

## 2017-05-28 RX ADMIN — TRAMADOL HYDROCHLORIDE 50 MG: 50 TABLET, COATED ORAL at 17:05

## 2017-05-28 RX ADMIN — CARVEDILOL 25 MG: 25 TABLET, FILM COATED ORAL at 08:38

## 2017-05-28 RX ADMIN — PANTOPRAZOLE SODIUM 40 MG: 40 TABLET, DELAYED RELEASE ORAL at 17:05

## 2017-05-28 RX ADMIN — LISINOPRIL 10 MG: 10 TABLET ORAL at 11:55

## 2017-05-28 RX ADMIN — PANTOPRAZOLE SODIUM 40 MG: 40 TABLET, DELAYED RELEASE ORAL at 06:32

## 2017-05-28 RX ADMIN — WARFARIN SODIUM 6 MG: 6 TABLET ORAL at 17:06

## 2017-05-28 RX ADMIN — ACETAMINOPHEN 650 MG: 325 TABLET, FILM COATED ORAL at 17:06

## 2017-05-28 RX ADMIN — CARVEDILOL 25 MG: 25 TABLET, FILM COATED ORAL at 22:03

## 2017-05-28 RX ADMIN — TRAMADOL HYDROCHLORIDE 50 MG: 50 TABLET, COATED ORAL at 08:41

## 2017-05-28 RX ADMIN — ENOXAPARIN SODIUM 40 MG: 40 INJECTION SUBCUTANEOUS at 22:03

## 2017-05-28 RX ADMIN — TRAMADOL HYDROCHLORIDE 50 MG: 50 TABLET, COATED ORAL at 23:47

## 2017-05-28 NOTE — PLAN OF CARE
Problem: Goal Outcome Summary  Goal: Goal Outcome Summary  DC Planner PT   Patient plan for D/C: Home with assist of roommates.  Surgeon plan for Home PT initially and then OP PT.   Current status: Pt performed bed mobility with min assist and sit to/from stand transfers with SBA.  Pt performed gait training 50 ft x 2 using crutches with CGA.  Pt had 1 LOB that required min assist to stabilize.  Pt strongly prefers crutches instead of walker even though he is more stable with walker.  Pt performed 3 steps x 1 trial using 1 rail and crutches with CGA.  Knee AAROM is 7-80 degrees.  Barriers to return to prior living situation: None  Recommendations for D/C: Home with assist and Home PT  Rationale for D/C recommendations: Patient will benefit from Home PT prior to OP PT to allow time to increase independence with functional mobility.       Entered by: Mariah Singh 05/28/2017 10:34 AM

## 2017-05-28 NOTE — PLAN OF CARE
Problem: Goal Outcome Summary  Goal: Goal Outcome Summary  Outcome: Improving  Pt VSS on RA. No IV access, CMS intact, denies calf pain. Dressing is c/d/i. Taking tramadol & tylenol for pain with relief. Tolerating reg diet. Plan is for discharge tomorrow. Will continue to monitor.

## 2017-05-28 NOTE — PLAN OF CARE
Problem: Goal Outcome Summary  Goal: Goal Outcome Summary  Outcome: Improving  Patient is A&O, VSS on RA, CMS intact, SBA, regular diet. No IV assess, voiding adequately in the bathroom, dressing CDI. Will continue to monitor

## 2017-05-28 NOTE — PROGRESS NOTES
[facility]    Hospitalist Progress Note    Assessment & Plan   Vaibhav Crabtree is a 66 year old male with PMHx of CAD, chronic systolic CHF, idiopathic cardiomyopathy, hypertension, dyslipidemia, iron deficiency anemia, stage III CKD and osteoarthritis who underwent an elective right total knee arthroplasty per Dr. Iyer on 5/24/2017. Hospitalist service was consulted for postoperative medical management.     S/p R TKA on 5/24/17: POD #4  Routine postop cares per ortho including pain control and DVT ppx  Therapies per protocol  Has scheduled bowel regimen  Encouraged pulmonary toilet    RLE Postop DVT:  Patient c/o pain in right calf postoperatively. RLE US on 5/25 was positive for an occlusive DVT in one of the posterior tibial veins and two soleal veins. Has hx of DVT in RLE.   -- anticoagulation per ortho --> presently on Lovenox 40mg BID and warfarin  -- ortho has been managing warfarin, had been therapeutic at 2.54 on 5/27 so evening dose of warfarin held per ortho --> INR now subtherapeutic at 1.62 this morning   -- ongoing mgmt of warfarin per ortho, will get 6mg tonight    Iron Deficiency Anemia, now with acute blood loss anemia:  This was diagnosed on his preop evaluation. Received Procrit and Injectafer infusions prior to surgery. Hgb 12.4 preop.   -- hgb 12.4 -- 11.1 -- 9.8 on 5/26  -- reported episode of possible coffee-ground emesis on 5/25 -- seen by GI and had EGD on 5/26 which showed multiple esophageal ulcerations and hiatal hernia  -- cont high dose PPI x2 mths -- will need repeat EGD in 2 mths  -- hgb stable at 9.5 on 5/27      Hypertension:   PTA meds: Coreg 25mg BID and lisinopril 10mg daily -- both initially resumed postop w/hold parameters  -- cont Coreg and lisinopril    Stage III CKD:  Baseline Cr is around 1.2.   -- Cr initially stable postop, trended up to 1.6 on POD #2 -- returned to baseline by POD #3 after gentle IVFs and holding lisinopril     Coronary artery  disease:  Idiopathic Cardiomyopathy  Chronic Systolic CHF:  With CTA from 01/2015 showing 50% to 70% stenosis in the LAD distribution. Cardiomyopathy diagnosed in 2014. Follows with Dr. Asif. Most recent EF 50-55% on echo in 1/2016. Lasix reportedly dc'd by PCP.   -- no s/sx to suggest volume overload  -- cont Coreg and lisinopril  -- PTA full dose aspirin now on hold while on anticoagulation     FEN: off IVFs, lytes stable, ADAT  DVT Prophylaxis: Lovenox, warfarin  Code Status: Full Code    Disposition: Anticipated discharge home today. As INR is now subtherapeutic, ortho wants to keep in hospital today, dc home tomorrow.    Farnaz Mosqueda    Interval History   Had planned to discharge home today though INR now subtherapeutic and ortho requesting discharge be delayed until tomorrow. Patient seen this afternoon. Resting comfortably. No complaints.     -Data reviewed today: I reviewed all new labs and imaging results over the last 24 hours. I personally reviewed no images or EKG's today.    Physical Exam   Temp: 98.6  F (37  C) Temp src: Oral BP: 135/87 Pulse: 86 Heart Rate: 86 Resp: 16 SpO2: 93 % O2 Device: None (Room air)    Vitals:    05/24/17 0857   Weight: 75.3 kg (166 lb)     Vital Signs with Ranges  Temp:  [98.3  F (36.8  C)-98.6  F (37  C)] 98.6  F (37  C)  Pulse:  [86-87] 86  Heart Rate:  [81-89] 86  Resp:  [16] 16  BP: (131-145)/(77-90) 135/87  SpO2:  [93 %-96 %] 93 %  I/O last 3 completed shifts:  In: 1240 [P.O.:1240]  Out: 1300 [Urine:1300]    Constitutional: Resting comfortably, alert and conversing appropriately, NAD  Respiratory: CTAB, no wheeze/rales/rhonchi  Cardiovascular: HRRR, no MGR  GI: S, NT, ND, +BS   Skin/Integumen: warm/dry  Other: No LE edema    Medications     NaCl 50 mL/hr at 05/26/17 1221     Warfarin Therapy Reminder         warfarin  6 mg Oral ONCE at 18:00     enoxaparin  40 mg Subcutaneous Q12H     pantoprazole  40 mg Oral BID AC     carvedilol (COREG) tablet 25 mg  25 mg  Oral BID     sodium chloride (PF)  3 mL Intracatheter Q8H     senna-docusate  1-2 tablet Oral BID       Data     Recent Labs  Lab 05/28/17  0630 05/27/17  1701 05/27/17  0700 05/26/17  0655  05/25/17  0640   WBC  --   --   --  6.6  --  5.9   HGB  --   --  9.5* 9.8*  Canceled, Test credited Duplicate requestCORRECTED ON 05/26 AT 0745: PREVIOUSLY REPORTED AS 9.8  --  11.1*   MCV  --   --   --  92  --  91   PLT  --   --  318 350  --  369   INR 1.62* 2.54* 2.51* 1.42*  < >  --    NA  --   --  141 140  --  140   POTASSIUM  --   --  3.7 4.1  --  4.0   CHLORIDE  --   --  107 108  --  108   CO2  --   --  26 24  --  25   BUN  --   --  15 23  --  13   CR 0.94  --  1.25 1.66*  --  1.09   ANIONGAP  --   --  8 8  --  7   ALCIDES  --   --  8.3* 8.0*  --  8.0*   GLC  --   --  82 96  --  102*   < > = values in this interval not displayed.    No results found for this or any previous visit (from the past 24 hour(s)).

## 2017-05-28 NOTE — PROGRESS NOTES
Hold  D/c  Till Monday   inr  Dropped  Too  Much    i  Restarted lovenox  For  Today   will  Set up  Home pt  And  inr  Checks for  First  10  Days  At  Home  Then out pt   Calf is  Very   Soft  Now   And  Pain  free    Continue  Pt  Bid  In  Hospital    D/c  Home  Planned   Monday

## 2017-05-29 VITALS
SYSTOLIC BLOOD PRESSURE: 146 MMHG | HEIGHT: 67 IN | DIASTOLIC BLOOD PRESSURE: 92 MMHG | WEIGHT: 166 LBS | BODY MASS INDEX: 26.06 KG/M2 | RESPIRATION RATE: 16 BRPM | OXYGEN SATURATION: 93 % | TEMPERATURE: 98.1 F | HEART RATE: 83 BPM

## 2017-05-29 LAB — INR PPP: 1.95 (ref 0.86–1.14)

## 2017-05-29 PROCEDURE — 85610 PROTHROMBIN TIME: CPT | Performed by: ORTHOPAEDIC SURGERY

## 2017-05-29 PROCEDURE — 25000132 ZZH RX MED GY IP 250 OP 250 PS 637: Performed by: PHYSICIAN ASSISTANT

## 2017-05-29 PROCEDURE — 36415 COLL VENOUS BLD VENIPUNCTURE: CPT | Performed by: ORTHOPAEDIC SURGERY

## 2017-05-29 PROCEDURE — 25000128 H RX IP 250 OP 636: Performed by: ORTHOPAEDIC SURGERY

## 2017-05-29 PROCEDURE — 25000132 ZZH RX MED GY IP 250 OP 250 PS 637: Performed by: ORTHOPAEDIC SURGERY

## 2017-05-29 PROCEDURE — 25000132 ZZH RX MED GY IP 250 OP 250 PS 637: Performed by: INTERNAL MEDICINE

## 2017-05-29 RX ORDER — WARFARIN SODIUM 2.5 MG/1
TABLET ORAL
Qty: 30 TABLET | Refills: 0 | Status: SHIPPED | OUTPATIENT
Start: 2017-05-29 | End: 2017-06-05

## 2017-05-29 RX ORDER — WARFARIN SODIUM 5 MG/1
TABLET ORAL
Qty: 30 TABLET | Refills: 0 | Status: SHIPPED | OUTPATIENT
Start: 2017-05-29 | End: 2017-06-05

## 2017-05-29 RX ORDER — FERROUS GLUCONATE 324(38)MG
324 TABLET ORAL
Qty: 60 TABLET | Refills: 0 | Status: SHIPPED | OUTPATIENT
Start: 2017-05-29 | End: 2020-03-18

## 2017-05-29 RX ADMIN — SENNOSIDES AND DOCUSATE SODIUM 2 TABLET: 8.6; 5 TABLET ORAL at 09:06

## 2017-05-29 RX ADMIN — ENOXAPARIN SODIUM 40 MG: 40 INJECTION SUBCUTANEOUS at 09:06

## 2017-05-29 RX ADMIN — CARVEDILOL 25 MG: 25 TABLET, FILM COATED ORAL at 09:06

## 2017-05-29 RX ADMIN — TRAMADOL HYDROCHLORIDE 50 MG: 50 TABLET, COATED ORAL at 06:19

## 2017-05-29 RX ADMIN — PANTOPRAZOLE SODIUM 40 MG: 40 TABLET, DELAYED RELEASE ORAL at 06:19

## 2017-05-29 RX ADMIN — LISINOPRIL 10 MG: 10 TABLET ORAL at 09:06

## 2017-05-29 NOTE — PROGRESS NOTES
"[facility] Orthopedic Post-Op Progress Note    Vaibhav Crabtree MRN# [id number]   YOB: 1950 Age: 66 year old            Interval History:   3 Days Post-Op from Total knee arthoplasty (Right)  Doing well.  Continues to improve.  Pain is well-controlled.  No fevers.  Tolerating physical therapy well.            Physical Exam:   BP (!) 146/92 (BP Location: Right arm)  Pulse 83  Temp 98.1  F (36.7  C) (Oral)  Resp 16  Ht 1.702 m (5' 7\")  Wt 75.3 kg (166 lb)  SpO2 93%  BMI 26 kg/m2    Dressing currently in place.  Mild swelling.  Movement and sensation intact distal to surgical site.  Calves non-tender.           Data:     Hemoglobin   Date Value Ref Range Status   05/27/2017 9.5 (L) 13.3 - 17.7 g/dL Final   05/26/2017  13.3 - 17.7 g/dL Corrected    Canceled, Test credited   Duplicate request  CORRECTED ON 05/26 AT 0745: PREVIOUSLY REPORTED AS 9.8     05/26/2017 9.8 (L) 13.3 - 17.7 g/dL Final   ]         Assessment and Plan:    Assessment:   Post-operative day #5  Total knee arthoplasty (Right)     Doing well.  No excessive bleeding  Pain well-controlled.  Tolerating physical therapy and rehabilitation well.           Plan:   Continue current medical management  Continue working with physical therapy  Plan on discharge to home later this afternoon  Continue current DVT prophylaxis           Urbano Pierce PA-C  "

## 2017-05-29 NOTE — PLAN OF CARE
Problem: Goal Outcome Summary  Goal: Goal Outcome Summary  Outcome: Improving  [id number]: Pt alert and oriented. Vital signs stable. No IV access. Dressing  CDI. Progressing per plan of care.

## 2017-05-29 NOTE — PLAN OF CARE
Problem: Goal Outcome Summary  Goal: Goal Outcome Summary  PT: Patient dressed and awaiting discharge upon arrival of therapist. Patient reports no concerns about returning home with assist from roommates. Encouraged patient to participate in therapy session prior to discharge to review stairs and safe use of crutches, patient declined.      Physical Therapy Discharge Summary     Reason for therapy discharge:    Discharged to home with home therapy.     Progress towards therapy goal(s). See goals on [facility] Plan [facility] record for goal details.  Goals not met.  Barriers to achieving goals:   discharge from facility.     Therapy recommendation(s):    Continued therapy is recommended.  Rationale/Recommendations:  Patient will benefit from Home PT prior to OP PT in order to improve knee ROM/strength and increase independence with functional mobility.

## 2017-05-29 NOTE — DISCHARGE SUMMARY
[facility] Discharge Summary    Vaibhav Crabtree MRN# [id number]   Age: 66 year old YOB: 1950     Date of Admission:  5/24/2017  Date of Discharge::  5/29/2017  Admitting Physician:  Marco A Iyer MD  Discharge Physician:  Marco A Iyer MD     Home clinic: [facility]          Admission Diagnoses:   Arthoplasty of the knee          Discharge Diagnosis:   Arthoplasty of the knee          Procedures:   Procedure(s): Total knee arthoplasty (Right)       Invasive procedures: endoscopy of  Upper  Gi  Tract  Esophageal  Ulceration  Venous  Doppler   Acute  Right  dvt           Medications Prior to Admission:     Prescriptions Prior to Admission   Medication Sig Dispense Refill Last Dose     FUROSEMIDE PO Take 20 mg by mouth daily   5/18/2017 at AM     Carvedilol (COREG PO) Take 25 mg by mouth 2 times daily    5/24/2017 at 0600     LISINOPRIL PO Take 10 mg by mouth daily (Takes 0.5 x 20mg tablet = 10mg dose)   5/18/2017 at AM     [DISCONTINUED] ASPIRIN PO Take 325 mg by mouth daily   5/18/2017 at AM     [DISCONTINUED] Omeprazole (PRILOSEC PO) Take 20 mg by mouth daily   5/18/2017 at AM             Discharge Medications:     Current Facility-Administered Medications   Medication     enoxaparin (LOVENOX) injection 40 mg     lisinopril (PRINIVIL/ZESTRIL) tablet 10 mg     traMADol (ULTRAM) tablet 50 mg     acetaminophen (OFIRMEV) infusion 1,000 mg     pantoprazole (PROTONIX) EC tablet 40 mg     oxyCODONE (ROXICODONE) IR tablet 5-10 mg     Warfarin Therapy Reminder (Check START DATE - warfarin may be starting in the FUTURE)     HYDROmorphone (PF) (DILAUDID) injection 0.3-0.5 mg     carvedilol (COREG) tablet 25 mg     lidocaine 1 % 1 mL     lidocaine (LMX4) cream     sodium chloride (PF) 0.9% PF flush 3 mL     sodium chloride (PF) 0.9% PF flush 3 mL     benzocaine-menthol (CHLORASEPTIC) 6-10 MG lozenge 1-2 lozenge     naloxone (NARCAN) injection 0.1-0.4 mg     acetaminophen (TYLENOL) tablet 650 mg      cyclobenzaprine (FLEXERIL) tablet 5 mg     hydrOXYzine (ATARAX) tablet 10 mg     senna-docusate (SENOKOT-S;PERICOLACE) 8.6-50 MG per tablet 1-2 tablet     hydrALAZINE (APRESOLINE) injection 10 mg     ondansetron (ZOFRAN-ODT) ODT tab 4 mg    Or     ondansetron (ZOFRAN) injection 4 mg     prochlorperazine (COMPAZINE) injection 5 mg    Or     prochlorperazine (COMPAZINE) tablet 5 mg    Or     prochlorperazine (COMPAZINE) Suppository 12.5 mg     metoclopramide (REGLAN) tablet 5 mg    Or     metoclopramide (REGLAN) injection 5 mg             Consultations:   Consultation during this admission received from hospitalists  And  GI          Brief History of Illness:   This patient was a 66 year old male with a known history of osteoarthritis.  He underwent a period of non-operative treatment which only provided mild and intermittent relief.  After a lengthy discussion and consultation with Dr. larsen, the patient chose to undergo a right side knee arthroplasty.  He was explained the risks,complications, and benefits of the proc  edure and elected to have the surgical procedure.  Patient was cleared by his primary care physician for joint replacement.           Hospital Course:   The patient tolerated the procedure well and was taken to postop recovery in stable condition.  Please refer to the full operative note for complete details.   In recovery, he had a post-operative hemoglobin of stable and was noted to be motor and neurovascular intact.  Post-operative films show components in excellent position.     On post-operative day 1, patient's wound was checked and noted to be healing well.  The drain output was within normal limits he  Had  Coffee ground emesis  For 36 hrs.  Gi  Consulted  And  Found ulcerations  No  Stomach  Or duodenal  Ulcers  Per report to  Me.  .  Patient had adequate pain control and was prescribed physical therapy.  He was given 24 hrs of perioperative antibiotics.  Patient had motor strength of  5/5 on the both sides.  Patient was neurovascularly intact in the both sides lower extremity. There were no complications throughout the hospital course as the patient passed physical therapy/occupational therapy on post-operative day #1.  The drain was pulled on post-operative day #1.  His discharge hemoglobin was 9.5 and the patient did not require a blood transfusion.  He was followed by the hospitalist and GI during this hospital visit to manage his medical problems.     Upon discharge, the patient was seen by Dr. ruano and all questions were answered.  He was discharged on home medications as outlined in medication reconciliation list outlined below.  The patient has instructions that if he has increased pain, fever, erythema, swelling or drainage to immediately call.          Discharge Instructions and Follow-Up:   Discharge diet: Regular   Discharge activity: Activity as tolerated  Keep affected extremity elevated as much as possible  Increase activity as tolerated  Stair use on at a time for the next two weeks  Up as tolerated  Devices: cpm  Do your  Exercises  4  Times per  Day  For  20 min  Each  session   Discharge follow-up: Follow up with primary care provider in 1 week   Wound care: Apply bandage daily  Keep wound clean and dry  May get incision wet in shower but do not soak or scrub  Staples out in 14 days  Post  Op  Or  So  14  To  18           Discharge Disposition:   Discharged to home  With  Home pt  For  4 visits and  Home  inr    rn  cks  For  10  days      Attestation:  Amount of time performed on this discharge summary: 15 minutes.  Care coordination / counseling time: 45 minutes    Marco A Iyer MD

## 2017-05-29 NOTE — PROGRESS NOTES
[facility] PROGRESS NOTE:     D/I: SW was contacted by pt's bedside nurse stating that pt has transportation to DC home today at 1030. Pt will have orders for HC and has requested the use of [facility], Pt will also have orders for a home CPM on DC. SW obtained orders, sent referral to [facility] and sent CPM order to [facility].   A: Pt is A&Ox4 and plans to return home today with HC.   P: Pt will DC to his home today with HC services via [facility] and a CPM machine through [facility].     Heather Allen, MSW, LGSW [phone]

## 2017-05-29 NOTE — PLAN OF CARE
Problem: Goal Outcome Summary  Goal: Goal Outcome Summary  Outcome: Improving  Up with one and a walker. Medications were filled here at our pharmacy. Discharge instructions provided. Pt discharged home via brother at this time.

## 2017-05-30 ENCOUNTER — TELEPHONE (OUTPATIENT)
Dept: FAMILY MEDICINE | Facility: CLINIC | Age: 67
End: 2017-05-30

## 2017-05-30 NOTE — TELEPHONE ENCOUNTER
Patient discharged from hospital 5/29 with having had total knee replacement, also had DVT after knee replacement    Started on warfarin and bridging with Lovenox     INR today 4.7    Called Dr. Iyer's office for more information regarding dosing, stated they will follow patient with monitoring INR and dosing at this time.     Given home care RN's number to contact    CINDY Baker

## 2017-05-30 NOTE — OP NOTE
PREOPERATIVE DIAGNOSIS:  Osteoarthritis of the right knee.      POSTOPERATIVE DIAGNOSIS:  Osteoarthritis of the right knee.      PROCEDURE:  Right total knee arthroplasty.      SURGEON:  Marco A Iyer MD       ASSISTANT:  JOSHUA Marcus      ANESTHESIA:  Spinal plus nerve block.      IMPLANTS:     1.  Biomet Vanguard posterior stabilized knee with:   a.  A 65 femur.   b.  A 75 tibia.   c.  A 60 mm posterior stabilized plus stem.   d.  A 37 patellar button.          PROPHYLAXIS:     1.  Antibiotics were given within 30 minutes prior to incision and will be continued for 24 hours postoperatively.   2.  Deep venous thrombosis prophylaxis with Lovenox, sequential calf compression devices, all of which will begin as appropriate.        TOURNIQUET TIME:  See Anesthesia record.      PREOPERATIVE NOTE:  Preoperatively and in my office the risk/benefit ratio was explained to the patient who desired to proceed.  The patient and I placed our initials on the operative right leg.      DESCRIPTION OF PROCEDURE:  The patient Vaibhav Crabtree was brought in the operating room on 05/24/2017 where anesthesia was administered.  The right leg was prepped and draped in the usual sterile fashion.  A padded tourniquet was placed on the right upper thigh.  I requested a time-out, all team members agreed, and we proceeded.  The tourniquet was inflated to approximately 300 or so.      A midline incision was made with a medial parapatellar arthrotomy.  We brought the patella over without full eversion and resected a few millimeters of bone sizing for a 37 button.  Marginal osteophytes were removed.  Inspection of the joint showed tricompartment disease, completely obliterated eburnated bone medially and moderate-to-severe weightbearing laterally, but with posterolateral eburnated bone as well.  At that point we identified the epicondylar axis, and utilizing the intramedullary guiding system we resected the distal cut according to  standards.  I believe this measured 9 mm.  Following that anteroposterior chamfer cuts were made using the outrigger to prevent notching.  We removed large osteophytes posteriorly.      I then proceeded to the tibia using the minimal resection guide based on tibia fit preoperatively.  We resected it.  It was a varus knee, so we corrected that.  The anterior cruciate ligament and posterior cruciate ligament were debrided away.  Meniscectomy was performed.  At that point we identified the center of the talus and tubercle, lined them up and placed the tibial guide in there.  A post hole was placed, and we proceeded to clean things up.  Trials were done.  We pulled the trials out, drilled cement anchoring holes, jet-lavaged the knee and dried it.  We injected approximately 30 cc of a Marcaine mixture into the posterior capsule.      We proceeded to cement all 3 components in simultaneously.  We pressurized it with the knee in extension and double checking for compression.  We irrigated the knee out copiously and proceeded.  We removed the trial components, placed the posterior stabilized plus size stem, and the locking clip was appropriately applied.  We made sure it was square.        We irrigated copiously with Betadine.  With the knee held in the physiologic flexed position further Marcaine was injected in the periosteum and muscle tendon junctions.  We then proceeded to close the retinaculum.  Post-capsular closure test was 0-140 degrees of flexion.  Deep subcutaneous tissue was closed with 2-0 Vicryl, and the skin was closed staples.  Hemovac was brought out.  Injected 2 grams of tranexamic acid into the joint.  The patient was transferred awake and alert to the Recovery Room.      PLAN:  Routine Physical Therapy and Occupational Therapy.         JOHANA SABA MD             D: 05/30/2017 06:03   T: 05/30/2017 06:54   MT: [id number]      Name:     DAMIAN MEEKS   MRN:      [id number]        Account:         [id number]   :      1950           Procedure Date: 2017      Document: [id number]       cc: Xander Lee MD

## 2017-05-31 ENCOUNTER — TELEPHONE (OUTPATIENT)
Dept: FAMILY MEDICINE | Facility: CLINIC | Age: 67
End: 2017-05-31

## 2017-05-31 NOTE — TELEPHONE ENCOUNTER
I called pt to find out where is short term disability forms need to be faxed. Pt said they need to be filled out by , his ortho surgeon. Forms faxed to [facility] at [phone]. Karl WHITE

## 2017-05-31 NOTE — TELEPHONE ENCOUNTER
"  ED for acute condition Discharge Protocol    \"Hi, my name is Daria BOBBY Myers, a registered nurse, and I am calling from [facility].  I am calling to follow up and see how things are going for you after your recent emergency visit.\"    Tell me how you are doing now that you are home?\" Patient states doing well.  Denies signs of infection at the incision site.  States that pain is controlled with pain medication      Discharge Instructions    \"Let's review your discharge instructions.  What is/are the follow-up recommendations?  Pt. Response: Yes with PCP    \"Has an appointment with your primary care provider been scheduled?\"  No (needed - schedule appointment and remind to bring meds)    Medications    \"Tell me what changed about your medicines when you discharged?\"    Pain medications and Warfarin    \"What questions do you have about your medications?\"   None    On warfarin: \"Were you given any recommendations for follow-up with the anticoagulation clinic?\" Patient's home care nurse was there doing INR at the time of call.    Call Summary    \"What questions or concerns do you have about your recent visit and your follow-up care?\"     none    \"If you have questions or things don't continue to improve, we encourage you contact us through the main clinic number (give number).  Even if the clinic is not open, triage nurses are available 24/7 to help you.     We would like you to know that our clinic has extended hours (provide information).  We also have urgent care (provide details on closest location and hours/contact info)\"    \"Thank you for your time and take care!\"                "

## 2017-05-31 NOTE — TELEPHONE ENCOUNTER
Pt was discharged from [facility] on 5/29 after being treated for Hematemsis, End Stage Osteoarthritis Right Knee , S/P Tkr (Total Knee Replacement) Not Using Cement, Right. Please call the pt. Thank you.  Alla Hawkins,

## 2017-06-02 LAB
BASOPHILS # BLD AUTO: 0.1 10E9/L (ref 0–0.2)
BASOPHILS NFR BLD AUTO: 1.1 %
DIFFERENTIAL METHOD BLD: ABNORMAL
EOSINOPHIL # BLD AUTO: 0.1 10E9/L (ref 0–0.7)
EOSINOPHIL NFR BLD AUTO: 2 %
ERYTHROCYTE [DISTWIDTH] IN BLOOD BY AUTOMATED COUNT: 22 % (ref 10–15)
HCT VFR BLD AUTO: 33.7 % (ref 40–53)
HGB BLD-MCNC: 10.2 G/DL (ref 13.3–17.7)
IMM GRANULOCYTES # BLD: 0.1 10E9/L (ref 0–0.4)
IMM GRANULOCYTES NFR BLD: 1.4 %
LYMPHOCYTES # BLD AUTO: 1.2 10E9/L (ref 0.8–5.3)
LYMPHOCYTES NFR BLD AUTO: 20.9 %
MCH RBC QN AUTO: 27.2 PG (ref 26.5–33)
MCHC RBC AUTO-ENTMCNC: 30.3 G/DL (ref 31.5–36.5)
MCV RBC AUTO: 90 FL (ref 78–100)
MONOCYTES # BLD AUTO: 0.6 10E9/L (ref 0–1.3)
MONOCYTES NFR BLD AUTO: 10.6 %
NEUTROPHILS # BLD AUTO: 3.5 10E9/L (ref 1.6–8.3)
NEUTROPHILS NFR BLD AUTO: 64 %
NRBC # BLD AUTO: 0 10*3/UL
NRBC BLD AUTO-RTO: 0 /100
PLATELET # BLD AUTO: 557 10E9/L (ref 150–450)
RBC # BLD AUTO: 3.75 10E12/L (ref 4.4–5.9)
WBC # BLD AUTO: 5.5 10E9/L (ref 4–11)

## 2017-06-02 PROCEDURE — 85025 COMPLETE CBC W/AUTO DIFF WBC: CPT | Performed by: ORTHOPAEDIC SURGERY

## 2017-06-03 ENCOUNTER — DOCUMENTATION ONLY (OUTPATIENT)
Dept: CARE COORDINATION | Facility: CLINIC | Age: 67
End: 2017-06-03

## 2017-06-03 NOTE — PROGRESS NOTES
Dear Dr. Lee,   [facility] process is that all ordered disciplines will be involved in the development of the plan of care.  The following disciplines were unable to see Vaibhav Crabtree; MRN [id number] within the 5 day evaluation window.  There will be a delay in the evalation visit by  PT  We will notify you when the evaluation is completed.  The patient has been notified.      Sincerely [facility]  Randell Serna  [phone]

## 2017-06-05 ENCOUNTER — OFFICE VISIT (OUTPATIENT)
Dept: FAMILY MEDICINE | Facility: CLINIC | Age: 67
End: 2017-06-05
Payer: COMMERCIAL

## 2017-06-05 ENCOUNTER — TELEPHONE (OUTPATIENT)
Dept: FAMILY MEDICINE | Facility: CLINIC | Age: 67
End: 2017-06-05

## 2017-06-05 VITALS
SYSTOLIC BLOOD PRESSURE: 134 MMHG | BODY MASS INDEX: 26.37 KG/M2 | HEART RATE: 71 BPM | OXYGEN SATURATION: 98 % | TEMPERATURE: 97.8 F | WEIGHT: 168 LBS | DIASTOLIC BLOOD PRESSURE: 82 MMHG | HEIGHT: 67 IN

## 2017-06-05 DIAGNOSIS — Z96.651 S/P TKR (TOTAL KNEE REPLACEMENT) NOT USING CEMENT, RIGHT: ICD-10-CM

## 2017-06-05 DIAGNOSIS — D62 ANEMIA DUE TO BLOOD LOSS, ACUTE: ICD-10-CM

## 2017-06-05 DIAGNOSIS — K92.2 UGI BLEED: Primary | ICD-10-CM

## 2017-06-05 DIAGNOSIS — I82.4Y9 ACUTE DEEP VEIN THROMBOSIS (DVT) OF PROXIMAL VEIN OF LOWER EXTREMITY, UNSPECIFIED LATERALITY (H): ICD-10-CM

## 2017-06-05 LAB
BASOPHILS # BLD AUTO: 0.1 10E9/L (ref 0–0.2)
BASOPHILS NFR BLD AUTO: 1.2 %
DIFFERENTIAL METHOD BLD: ABNORMAL
EOSINOPHIL # BLD AUTO: 0.1 10E9/L (ref 0–0.7)
EOSINOPHIL NFR BLD AUTO: 2.1 %
ERYTHROCYTE [DISTWIDTH] IN BLOOD BY AUTOMATED COUNT: 21.6 % (ref 10–15)
HCT VFR BLD AUTO: 36.8 % (ref 40–53)
HGB BLD-MCNC: 11.5 G/DL (ref 13.3–17.7)
LYMPHOCYTES # BLD AUTO: 1.5 10E9/L (ref 0.8–5.3)
LYMPHOCYTES NFR BLD AUTO: 23.8 %
MCH RBC QN AUTO: 28 PG (ref 26.5–33)
MCHC RBC AUTO-ENTMCNC: 31.3 G/DL (ref 31.5–36.5)
MCV RBC AUTO: 90 FL (ref 78–100)
MONOCYTES # BLD AUTO: 0.6 10E9/L (ref 0–1.3)
MONOCYTES NFR BLD AUTO: 9.2 %
NEUTROPHILS # BLD AUTO: 3.9 10E9/L (ref 1.6–8.3)
NEUTROPHILS NFR BLD AUTO: 63.7 %
PLATELET # BLD AUTO: 564 10E9/L (ref 150–450)
RBC # BLD AUTO: 4.1 10E12/L (ref 4.4–5.9)
WBC # BLD AUTO: 6.1 10E9/L (ref 4–11)

## 2017-06-05 PROCEDURE — 99495 TRANSJ CARE MGMT MOD F2F 14D: CPT | Performed by: FAMILY MEDICINE

## 2017-06-05 PROCEDURE — 85025 COMPLETE CBC W/AUTO DIFF WBC: CPT | Performed by: FAMILY MEDICINE

## 2017-06-05 PROCEDURE — 36415 COLL VENOUS BLD VENIPUNCTURE: CPT | Performed by: FAMILY MEDICINE

## 2017-06-05 NOTE — NURSING NOTE
"Chief Complaint   Patient presents with     Hospital F/U       Initial /82 (BP Location: Left arm, Patient Position: Chair, Cuff Size: Adult Regular)  Pulse 71  Temp 97.8  F (36.6  C) (Tympanic)  Ht 5' 7\" (1.702 m)  Wt 168 lb (76.2 kg)  SpO2 98%  BMI 26.31 kg/m2 Estimated body mass index is 26.31 kg/(m^2) as calculated from the following:    Height as of this encounter: 5' 7\" (1.702 m).    Weight as of this encounter: 168 lb (76.2 kg).  Medication Reconciliation: complete     Corrina Villa CMA      "

## 2017-06-05 NOTE — MR AVS SNAPSHOT
"              After Visit Summary   6/5/2017    Vaibhav Crabtree    MRN: [id number]           Patient Information     Date Of Birth          1950        Visit Information        Provider Department      6/5/2017 10:20 AM Xander Lee MD [facility]        Today's Diagnoses     UGI bleed    -  1    S/P TKR (total knee replacement) not using cement, right        Acute deep vein thrombosis (DVT) of proximal vein of lower extremity, unspecified laterality (H)        Anemia due to blood loss, acute           Follow-ups after your visit        Who to contact     If you have questions or need follow up information about today's clinic visit or your schedule please contact [facility] directly at [phone].  Normal or non-critical lab and imaging results will be communicated to you by Tier 3hart, letter or phone within 4 business days after the clinic has received the results. If you do not hear from us within 7 days, please contact the clinic through MyChart or phone. If you have a critical or abnormal lab result, we will notify you by phone as soon as possible.  Submit refill requests through Zen[employer] or call your pharmacy and they will forward the refill request to us. Please allow 3 business days for your refill to be completed.          Additional Information About Your Visit        MyChart Information     [employer] lets you send messages to your doctor, view your test results, renew your prescriptions, schedule appointments and more. To sign up, go to [email or web address] . Click on \"Log in\" on the left side of the screen, which will take you to the Welcome page. Then click on \"Sign up Now\" on the right side of the page.     You will be asked to enter the access code listed below, as well as some personal information. Please follow the directions to create your username and password.     Your access code is: [email or web address]  Expires: 7/12/2017  9:53 AM     Your access code will " " in 90 days. If you need help or a new code, please call your [facility] clinic or [phone].        Care EveryWhere ID     This is your Care EveryWhere ID. This could be used by other organizations to access your [facility] medical records  [id number]        Your Vitals Were     Pulse Temperature Height Pulse Oximetry BMI (Body Mass Index)       71 97.8  F (36.6  C) (Tympanic) 5' 7\" (1.702 m) 98% 26.31 kg/m2        Blood Pressure from Last 3 Encounters:   17 134/82   17 (!) 146/92   17 138/74    Weight from Last 3 Encounters:   17 168 lb (76.2 kg)   17 166 lb (75.3 kg)   17 171 lb (77.6 kg)              We Performed the Following     CBC with platelets and differential        Primary Care Provider Office Phone # Fax #    Xander Lee [phone]       [address] [address]        Thank you!     Thank you for choosing [facility]  [place] your [place]. Our goal is always to provide you with excellent care. Hearing back from our patients is one way we can continue to improve our services. Please take a few minutes to complete the written survey that you may receive in the mail after your visit with us. Thank you!             Your Updated Medication List - Protect others around you: Learn how to safely use, store and throw away your medicines at www.disposemymeds.org.          This list is accurate as of: 17 10:46 AM.  Always use your most recent med list.                   Brand Name Dispense Instructions for use    COREG PO      Take 25 mg by mouth 2 times daily       ferrous gluconate 324 (38 FE) MG tablet    FERGON    60 tablet    Take 1 tablet (324 mg) by mouth daily (with breakfast)       FUROSEMIDE PO      Take 20 mg by mouth daily       LISINOPRIL PO      Take 10 mg by mouth daily (Takes 0.5 x 20mg tablet = 10mg dose)       * order for DME     1 each    Equipment being ordered: Walker " Wheels () and Walker () Treatment Diagnosis: Impaired gait       * order for DME     1 each    Equipment being ordered: Crutches () Treatment Diagnosis: Impaired gait       XARELTO 15 MG Tabs tablet   Generic drug:  rivaroxaban ANTICOAGULANT      Take 15 mg by mouth 2 times daily (with meals)       * Notice:  This list has 2 medication(s) that are the same as other medications prescribed for you. Read the directions carefully, and ask your doctor or other care provider to review them with you.

## 2017-06-05 NOTE — PROGRESS NOTES
SUBJECTIVE:                                                    Vaibhav Crabtree is a 66 year old male who presents to clinic today for the following health issues:          Hospital Follow-up Visit:    Hospital/Nursing Home/IP Rehab Facility: [facility]  Date of Admission: 5/24/17  Date of Discharge: 5/29/17  Reason(s) for Admission: Knee surgery and blood clot            Problems taking medications regularly:  None       Medication changes since discharge: None       Problems adhering to non-medication therapy:  None    Summary of hospitalization:  [facility] discharge summary reviewed  Diagnostic Tests/Treatments reviewed.  Follow up needed: none  Other Healthcare Providers Involved in Patient s Care:         None  Update since discharge: improved.     Post Discharge Medication Reconciliation: discharge medications reconciled, continue medications without change.  Plan of care communicated with patient     Coding guidelines for this visit:  Type of Medical   Decision Making Face-to-Face Visit       within 7 Days of discharge Face-to-Face Visit        within 14 days of discharge   Moderate Complexity [id number]   High Complexity [id number]            Problem list and histories reviewed & adjusted, as indicated.  Additional history: as documented    Patient Active Problem List   Diagnosis     Dyspnea and respiratory abnormality     Impotence of organic origin     Adjustment reaction     CARDIOVASCULAR SCREENING; LDL GOAL LESS THAN 130     Pain, joint, knee, right     Advanced directives, counseling/discussion     Arthritis of knee, right     Unstable angina (H)     Idiopathic cardiomyopathy (H)     Congestive heart failure (H)     Hypertension     Hyperlipidemia     CAD (coronary artery disease)     Mitral regurgitation     Atypical chest pain     Claudication of right lower extremity (H)     RAYSHAWN (acute kidney injury) (H)     Orthostatic hypotension     Anemia, unspecified     Intestinal  malabsorption, unspecified     Iron adverse reaction     Need for prophylactic measure     S/P TKR (total knee replacement) not using cement, right     Past Surgical History:   Procedure Laterality Date     ARTHROPLASTY KNEE Right 5/24/2017    Procedure: ARTHROPLASTY KNEE;  RIGHT TOTAL KNEE ARTHROPLASTY (BIOMET)^ ;  Surgeon: Marco A Iyer MD;  Location: [facility] OR     C NONSPECIFIC PROCEDURE  2002    ulnar nerve     C NONSPECIFIC PROCEDURE      right rotator cuff repair     ESOPHAGOSCOPY, GASTROSCOPY, DUODENOSCOPY (EGD), COMBINED N/A 1/24/2017    Procedure: COMBINED ESOPHAGOSCOPY, GASTROSCOPY, DUODENOSCOPY (EGD), BIOPSY SINGLE OR MULTIPLE;  Surgeon: Reji Mcghee MD;  Location:  GI     ESOPHAGOSCOPY, GASTROSCOPY, DUODENOSCOPY (EGD), COMBINED N/A 5/26/2017    Procedure: COMBINED ESOPHAGOSCOPY, GASTROSCOPY, DUODENOSCOPY (EGD);  gastroscopy;  Surgeon: Renae Mendosa MD;  Location:  GI     HC REMOVAL OF TONSILS,<11 Y/O      Tonsils <10y.o.     HC REPAIR ROTATOR CUFF,ACUTE  02 and 03    left rotator cuff repair       Social History   Substance Use Topics     Smoking status: Former Smoker     Packs/day: 4.00     Years: 15.00     Types: Cigarettes     Quit date: 6/20/1971     Smokeless tobacco: Never Used     Alcohol use 0.0 oz/week     0 Standard drinks or equivalent per week      Comment: caitlin maloney     Family History   Problem Relation Age of Onset     Respiratory Father      COPD     Unknown/Adopted Mother      CANCER Brother      CANCER Brother      Connective Tissue Disorder Paternal Grandfather      Depression Daughter      Eye Disorder Son          Current Outpatient Prescriptions   Medication Sig Dispense Refill     rivaroxaban ANTICOAGULANT (XARELTO) 15 MG TABS tablet Take 15 mg by mouth 2 times daily (with meals)       order for DME Equipment being ordered: Crutches ()  Treatment Diagnosis: Impaired gait 1 each 0     order for DME Equipment being ordered: Walker Wheels () and Walker  ()  Treatment Diagnosis: Impaired gait 1 each 0     FUROSEMIDE PO Take 20 mg by mouth daily       Carvedilol (COREG PO) Take 25 mg by mouth 2 times daily        LISINOPRIL PO Take 10 mg by mouth daily (Takes 0.5 x 20mg tablet = 10mg dose)       ferrous gluconate (FERGON) 324 (38 FE) MG tablet Take 1 tablet (324 mg) by mouth daily (with breakfast) (Patient not taking: Reported on 6/5/2017) 60 tablet 0     Allergies   Allergen Reactions     No Known Allergies      Recent Labs   Lab Test  05/28/17   0630  05/27/17   0700  05/26/17   0655   04/16/17   0322   01/20/17   0957   01/02/17   0956   01/12/15   0525  01/11/15   1240   05/28/14   0605   05/27/14   0255   LDL   --    --    --    --    --    --    --    --    --    --   96   --    --   69   --    --    HDL   --    --    --    --    --    --    --    --    --    --   77   --    --   109   --    --    TRIG   --    --    --    --    --    --    --    --    --    --   130   --    --   130   --    --    ALT   --    --    --    --   31   --   26   --   28   < >  34  43   --    --    --    --    CR  0.94  1.25  1.66*   < >  2.06*   < >  1.61*   < >  3.92*   < >  1.13  1.21   < >   --    < >  1.11   GFRESTIMATED  79  57*  41*   < >  32*   < >  43*   < >  15*   < >  65  60*   < >   --    < >  66   GFRESTBLACK  >90   GFR Calc    69  50*   < >  39*   < >  52*   < >  19*   < >  78  72   < >   --    < >  80   POTASSIUM   --   3.7  4.1   < >  3.2*   < >  3.9   < >  3.2*   < >  4.3  4.0   < >  3.7   < >   --    TSH   --    --    --    --    --    --    --    --    --    --    --   1.90   --    --    --   6.58*    < > = values in this interval not displayed.      BP Readings from Last 3 Encounters:   06/05/17 134/82   05/29/17 (!) 146/92   05/18/17 138/74    Wt Readings from Last 3 Encounters:   06/05/17 168 lb (76.2 kg)   05/24/17 166 lb (75.3 kg)   05/18/17 171 lb (77.6 kg)                    Reviewed and updated as needed this visit by clinical staff      "  Reviewed and updated as needed this visit by Provider         ROS:  Constitutional, HEENT, cardiovascular, pulmonary, gi and gu systems are negative, except as otherwise noted.    OBJECTIVE:                                                    /82 (BP Location: Left arm, Patient Position: Chair, Cuff Size: Adult Regular)  Pulse 71  Temp 97.8  F (36.6  C) (Tympanic)  Ht 5' 7\" (1.702 m)  Wt 168 lb (76.2 kg)  SpO2 98%  BMI 26.31 kg/m2  Body mass index is 26.31 kg/(m^2).  GENERAL: healthy, alert and no distress  NECK: no adenopathy, no asymmetry, masses, or scars and thyroid normal to palpation  RESP: lungs clear to auscultation - no rales, rhonchi or wheezes  CV: regular rate and rhythm, normal S1 S2, no S3 or S4, no murmur, click or rub, no peripheral edema and peripheral pulses strong  ABDOMEN: soft, nontender, no hepatosplenomegaly, no masses and bowel sounds normal  MS: no gross musculoskeletal defects noted, no edema         ASSESSMENT/PLAN:                                                    ASSESSMENT / PLAN:  (K92.2) UGI bleed  (primary encounter diagnosis)  Comment: had it during admission, not able to take PPI due to newly diagnosed DVT and Xarelto , has been stable without clinical sx, will recheck CBC  Plan: CBC with platelets and differential            (Z96.651) S/P TKR (total knee replacement) not using cement, right  Comment: mentioned above   Plan: mentioned above     (I82.4Y9) Acute deep vein thrombosis (DVT) of proximal vein of lower extremity, unspecified laterality (H)  Comment: mentioned above,   Currently on Xarelto 15mg bid, it should be changed to 20mg daily after 21st days of 15mg bid. It could be considered maintenance of treatment after 3 months trial   Plan: CBC with platelets and differential            (D62) Anemia due to blood loss, acute  Comment: mentioned above   Plan: CBC with platelets and differential              FUTURE APPOINTMENTS:       - Follow-up visit in 3 months " for f/u DVT    Xander Lee MD  [facility]

## 2017-06-05 NOTE — TELEPHONE ENCOUNTER
Patient call the home care nurse because he said someone from [facility] called about needing his INR checked.    Per today's appt note, patient is on Xarelto.    Informed the home care nurse that patient was given his lab results earlier this afternoon. No other documented call.    Did anyone leave a message for us to call back.    Germania Adams RN

## 2017-06-06 ENCOUNTER — TRANSFERRED RECORDS (OUTPATIENT)
Dept: HEALTH INFORMATION MANAGEMENT | Facility: CLINIC | Age: 67
End: 2017-06-06

## 2017-06-06 NOTE — TELEPHONE ENCOUNTER
No document found regarding INR call yesterday.  Per chart review, patient on Xarelto and does not need to monitor INR.     Madeline, home care nurse informed.     Skye Wilkerson RN

## 2017-06-08 ENCOUNTER — MEDICAL CORRESPONDENCE (OUTPATIENT)
Dept: HEALTH INFORMATION MANAGEMENT | Facility: CLINIC | Age: 67
End: 2017-06-08

## 2017-06-18 ENCOUNTER — TRANSFERRED RECORDS (OUTPATIENT)
Dept: HEALTH INFORMATION MANAGEMENT | Facility: CLINIC | Age: 67
End: 2017-06-18

## 2017-06-20 ENCOUNTER — TRANSFERRED RECORDS (OUTPATIENT)
Dept: HEALTH INFORMATION MANAGEMENT | Facility: CLINIC | Age: 67
End: 2017-06-20

## 2017-07-25 ENCOUNTER — TRANSFERRED RECORDS (OUTPATIENT)
Dept: HEALTH INFORMATION MANAGEMENT | Facility: CLINIC | Age: 67
End: 2017-07-25

## 2017-08-03 DIAGNOSIS — I82.4Z9 DEEP VEIN THROMBOSIS (DVT) OF DISTAL VEIN OF LOWER EXTREMITY, UNSPECIFIED CHRONICITY, UNSPECIFIED LATERALITY (H): Primary | ICD-10-CM

## 2017-08-03 NOTE — TELEPHONE ENCOUNTER
rivaroxaban ANTICOAGULANT (XARELTO) 15 MG           Last Written Prescription Date: historical  Last Fill Quantity: ?, # refills: ?    Last Office Visit with G, P or [facility] prescribing provider:  6/5/17   Future Office Visit:       Lab Results   Component Value Date    WBC 6.1 06/05/2017     Lab Results   Component Value Date    RBC 4.10 06/05/2017     Lab Results   Component Value Date    HGB 11.5 06/05/2017     Lab Results   Component Value Date    HCT 36.8 06/05/2017     No components found for: MCT  Lab Results   Component Value Date    MCV 90 06/05/2017     Lab Results   Component Value Date    MCH 28.0 06/05/2017     Lab Results   Component Value Date    MCHC 31.3 06/05/2017     Lab Results   Component Value Date    RDW 21.6 06/05/2017     Lab Results   Component Value Date     06/05/2017     Lab Results   Component Value Date    AST 19 04/16/2017     Lab Results   Component Value Date    ALT 31 04/16/2017     Creatinine   Date Value Ref Range Status   05/28/2017 0.94 0.66 - 1.25 mg/dL Final   ]

## 2017-08-04 NOTE — TELEPHONE ENCOUNTER
Routing refill request to provider for review/approval because:  Medication is reported/historical  Germania Adams RN

## 2017-09-08 DIAGNOSIS — I10 BENIGN ESSENTIAL HYPERTENSION: Primary | ICD-10-CM

## 2017-09-08 RX ORDER — LISINOPRIL 20 MG/1
TABLET ORAL
Qty: 90 TABLET | Refills: 0 | Status: SHIPPED | OUTPATIENT
Start: 2017-09-08 | End: 2017-11-03

## 2017-09-08 NOTE — TELEPHONE ENCOUNTER
Routing refill request to provider for review/approval because:  Medication is reported/historical    Daria Gamboa RN - Triage  [facility]

## 2017-09-08 NOTE — TELEPHONE ENCOUNTER
Lisinopril       Last Written Prescription Date: unknown   Last Fill Quantity: , # refills: unknown   [place] Visit with [facility] or [facility] prescribing provider: 6/5/2017       Potassium   Date Value Ref Range Status   05/27/2017 3.7 3.4 - 5.3 mmol/L Final     Creatinine   Date Value Ref Range Status   05/28/2017 0.94 0.66 - 1.25 mg/dL Final     BP Readings from Last 3 Encounters:   06/05/17 134/82   05/29/17 (!) 146/92   05/18/17 138/74

## 2017-10-04 ENCOUNTER — OFFICE VISIT (OUTPATIENT)
Dept: FAMILY MEDICINE | Facility: CLINIC | Age: 67
End: 2017-10-04
Payer: COMMERCIAL

## 2017-10-04 VITALS
OXYGEN SATURATION: 98 % | DIASTOLIC BLOOD PRESSURE: 98 MMHG | WEIGHT: 178 LBS | TEMPERATURE: 97.5 F | HEIGHT: 67 IN | RESPIRATION RATE: 14 BRPM | BODY MASS INDEX: 27.94 KG/M2 | SYSTOLIC BLOOD PRESSURE: 158 MMHG | HEART RATE: 81 BPM

## 2017-10-04 DIAGNOSIS — M54.9 ACUTE BILATERAL BACK PAIN, UNSPECIFIED BACK LOCATION: Primary | ICD-10-CM

## 2017-10-04 DIAGNOSIS — R10.9 FLANK PAIN: ICD-10-CM

## 2017-10-04 DIAGNOSIS — R68.89 FLU-LIKE SYMPTOMS: ICD-10-CM

## 2017-10-04 DIAGNOSIS — Z12.11 SCREEN FOR COLON CANCER: ICD-10-CM

## 2017-10-04 DIAGNOSIS — R30.0 DYSURIA: ICD-10-CM

## 2017-10-04 LAB
ALBUMIN UR-MCNC: 100 MG/DL
APPEARANCE UR: CLEAR
BACTERIA #/AREA URNS HPF: ABNORMAL /HPF
BASOPHILS # BLD AUTO: 0.1 10E9/L (ref 0–0.2)
BASOPHILS NFR BLD AUTO: 1.1 %
BILIRUB UR QL STRIP: ABNORMAL
COLOR UR AUTO: ABNORMAL
DIFFERENTIAL METHOD BLD: ABNORMAL
EOSINOPHIL # BLD AUTO: 0.1 10E9/L (ref 0–0.7)
EOSINOPHIL NFR BLD AUTO: 2 %
ERYTHROCYTE [DISTWIDTH] IN BLOOD BY AUTOMATED COUNT: 17.6 % (ref 10–15)
GLUCOSE UR STRIP-MCNC: NEGATIVE MG/DL
HCT VFR BLD AUTO: 42.3 % (ref 40–53)
HGB BLD-MCNC: 13.6 G/DL (ref 13.3–17.7)
HGB UR QL STRIP: NEGATIVE
KETONES UR STRIP-MCNC: NEGATIVE MG/DL
LEUKOCYTE ESTERASE UR QL STRIP: NEGATIVE
LYMPHOCYTES # BLD AUTO: 1.1 10E9/L (ref 0.8–5.3)
LYMPHOCYTES NFR BLD AUTO: 25.3 %
MCH RBC QN AUTO: 31.5 PG (ref 26.5–33)
MCHC RBC AUTO-ENTMCNC: 32.2 G/DL (ref 31.5–36.5)
MCV RBC AUTO: 98 FL (ref 78–100)
MONOCYTES # BLD AUTO: 0.4 10E9/L (ref 0–1.3)
MONOCYTES NFR BLD AUTO: 9.7 %
MUCOUS THREADS #/AREA URNS LPF: PRESENT /LPF
NEUTROPHILS # BLD AUTO: 2.7 10E9/L (ref 1.6–8.3)
NEUTROPHILS NFR BLD AUTO: 61.9 %
NITRATE UR QL: NEGATIVE
NON-SQ EPI CELLS #/AREA URNS LPF: ABNORMAL /LPF
PH UR STRIP: 5.5 PH (ref 5–7)
PLATELET # BLD AUTO: 276 10E9/L (ref 150–450)
RBC # BLD AUTO: 4.32 10E12/L (ref 4.4–5.9)
RBC #/AREA URNS AUTO: ABNORMAL /HPF
SOURCE: ABNORMAL
SP GR UR STRIP: 1.02 (ref 1–1.03)
UROBILINOGEN UR STRIP-ACNC: 2 EU/DL (ref 0.2–1)
WBC # BLD AUTO: 4.4 10E9/L (ref 4–11)
WBC #/AREA URNS AUTO: ABNORMAL /HPF

## 2017-10-04 PROCEDURE — 85025 COMPLETE CBC W/AUTO DIFF WBC: CPT | Performed by: FAMILY MEDICINE

## 2017-10-04 PROCEDURE — 81001 URINALYSIS AUTO W/SCOPE: CPT | Performed by: FAMILY MEDICINE

## 2017-10-04 PROCEDURE — 80048 BASIC METABOLIC PNL TOTAL CA: CPT | Performed by: FAMILY MEDICINE

## 2017-10-04 PROCEDURE — 36415 COLL VENOUS BLD VENIPUNCTURE: CPT | Performed by: FAMILY MEDICINE

## 2017-10-04 PROCEDURE — 99214 OFFICE O/P EST MOD 30 MIN: CPT | Performed by: FAMILY MEDICINE

## 2017-10-04 RX ORDER — TAMSULOSIN HYDROCHLORIDE 0.4 MG/1
0.4 CAPSULE ORAL DAILY
Qty: 15 CAPSULE | Refills: 0 | Status: SHIPPED | OUTPATIENT
Start: 2017-10-04 | End: 2017-10-16

## 2017-10-04 RX ORDER — HYDROCODONE BITARTRATE AND ACETAMINOPHEN 5; 325 MG/1; MG/1
1-2 TABLET ORAL EVERY 8 HOURS PRN
Qty: 15 TABLET | Refills: 0 | Status: SHIPPED | OUTPATIENT
Start: 2017-10-04 | End: 2017-11-08

## 2017-10-04 NOTE — PROGRESS NOTES
SUBJECTIVE:   Vaibhav Crabtree is a 67 year old male who presents to clinic today for the following health issues:      Back Pain      Duration: 1 week     Description  Low back pain    Severity: moderate    Accompanying signs and symptoms: vomiting     History (predisposing factors):  history of kidney failure     Precipitating or alleviating factors: None    Therapies tried and outcome:  none      Problem list and histories reviewed & adjusted, as indicated.  Additional history: as documented    Patient Active Problem List   Diagnosis     Dyspnea and respiratory abnormality     Impotence of organic origin     Adjustment reaction     CARDIOVASCULAR SCREENING; LDL GOAL LESS THAN 130     Pain, joint, knee, right     Advanced directives, counseling/discussion     Arthritis of knee, right     Unstable angina (H)     Idiopathic cardiomyopathy (H)     Congestive heart failure (H)     Hypertension     Hyperlipidemia     CAD (coronary artery disease)     Mitral regurgitation     Atypical chest pain     Claudication of right lower extremity (H)     RAYSHAWN (acute kidney injury) (H)     Orthostatic hypotension     Anemia, unspecified     Intestinal malabsorption, unspecified     Iron adverse reaction     Need for prophylactic measure     S/P TKR (total knee replacement) not using cement, right     Past Surgical History:   Procedure Laterality Date     ARTHROPLASTY KNEE Right 5/24/2017    Procedure: ARTHROPLASTY KNEE;  RIGHT TOTAL KNEE ARTHROPLASTY (BIOMET)^ ;  Surgeon: Marco A Iyer MD;  Location:  OR     C NONSPECIFIC PROCEDURE  2002    ulnar nerve     C NONSPECIFIC PROCEDURE      right rotator cuff repair     ESOPHAGOSCOPY, GASTROSCOPY, DUODENOSCOPY (EGD), COMBINED N/A 1/24/2017    Procedure: COMBINED ESOPHAGOSCOPY, GASTROSCOPY, DUODENOSCOPY (EGD), BIOPSY SINGLE OR MULTIPLE;  Surgeon: Reji Mcghee MD;  Location:  GI     ESOPHAGOSCOPY, GASTROSCOPY, DUODENOSCOPY (EGD), COMBINED N/A 5/26/2017    Procedure: COMBINED  ESOPHAGOSCOPY, GASTROSCOPY, DUODENOSCOPY (EGD);  gastroscopy;  Surgeon: Renae Mendosa MD;  Location: [facility] GI     HC REMOVAL OF TONSILS,<11 Y/O      Tonsils <10y.o.     HC REPAIR ROTATOR CUFF,ACUTE  02 and 03    left rotator cuff repair       Social History   Substance Use Topics     Smoking status: Former Smoker     Packs/day: 4.00     Years: 15.00     Types: Cigarettes     Quit date: 6/20/1971     Smokeless tobacco: Never Used     Alcohol use 0.0 oz/week     0 Standard drinks or equivalent per week      Comment: occ. beer     Family History   Problem Relation Age of Onset     Respiratory Father      COPD     Unknown/Adopted Mother      CANCER Brother      CANCER Brother      Connective Tissue Disorder Paternal Grandfather      Depression Daughter      Eye Disorder Son          Current Outpatient Prescriptions   Medication Sig Dispense Refill     tamsulosin (FLOMAX) 0.4 MG capsule Take 1 capsule (0.4 mg) by mouth daily 15 capsule 0     HYDROcodone-acetaminophen (NORCO) 5-325 MG per tablet Take 1-2 tablets by mouth every 8 hours as needed for moderate to severe pain maximum 3 tablet(s) per day 15 tablet 0     lisinopril (PRINIVIL/ZESTRIL) 20 MG tablet TAKE 1 TABLET BY MOUTH DAILY 90 tablet 0     ferrous gluconate (FERGON) 324 (38 FE) MG tablet Take 1 tablet (324 mg) by mouth daily (with breakfast) 60 tablet 0     FUROSEMIDE PO Take 20 mg by mouth daily       Carvedilol (COREG PO) Take 25 mg by mouth 2 times daily        rivaroxaban ANTICOAGULANT (XARELTO) 20 MG TABS tablet Take 1 tablet (20 mg) by mouth daily (with dinner) (Patient not taking: Reported on 10/4/2017) 90 tablet 1     rivaroxaban ANTICOAGULANT (XARELTO) 15 MG TABS tablet Take 15 mg by mouth 2 times daily (with meals)       Allergies   Allergen Reactions     No Known Allergies      Recent Labs   Lab Test  05/28/17   0630  05/27/17   0700  05/26/17   0655   04/16/17   0322   01/20/17   0957   01/02/17   0956   01/12/15   0525  01/11/15   1240    "05/28/14   0605 05/27/14   0255   LDL   --    --    --    --    --    --    --    --    --    --   96   --    --   69   --    --    HDL   --    --    --    --    --    --    --    --    --    --   77   --    --   109   --    --    TRIG   --    --    --    --    --    --    --    --    --    --   130   --    --   130   --    --    ALT   --    --    --    --   31   --   26   --   28   < >  34  43   --    --    --    --    CR  0.94  1.25  1.66*   < >  2.06*   < >  1.61*   < >  3.92*   < >  1.13  1.21   < >   --    < >  1.11   GFRESTIMATED  79  57*  41*   < >  32*   < >  43*   < >  15*   < >  65  60*   < >   --    < >  66   GFRESTBLACK  >90   GFR Calc    69  50*   < >  39*   < >  52*   < >  19*   < >  78  72   < >   --    < >  80   POTASSIUM   --   3.7  4.1   < >  3.2*   < >  3.9   < >  3.2*   < >  4.3  4.0   < >  3.7   < >   --    TSH   --    --    --    --    --    --    --    --    --    --    --   1.90   --    --    --   6.58*    < > = values in this interval not displayed.      BP Readings from Last 3 Encounters:   10/04/17 (!) 158/98   06/05/17 134/82   05/29/17 (!) 146/92    Wt Readings from Last 3 Encounters:   10/04/17 178 lb (80.7 kg)   06/05/17 168 lb (76.2 kg)   05/24/17 166 lb (75.3 kg)           Reviewed and updated as needed this visit by clinical staff     Reviewed and updated as needed this visit by Provider         ROS:  Constitutional, HEENT, cardiovascular, pulmonary, gi and gu systems are negative, except as otherwise noted.      OBJECTIVE:   BP (!) 158/98 (Cuff Size: Adult Large)  Pulse 81  Temp 97.5  F (36.4  C) (Tympanic)  Resp 14  Ht 5' 7\" (1.702 m)  Wt 178 lb (80.7 kg)  SpO2 98%  BMI 27.88 kg/m2  Body mass index is 27.88 kg/(m^2).  GENERAL: healthy, alert and no distress  NECK: no adenopathy, no asymmetry, masses, or scars and thyroid normal to palpation  RESP: lungs clear to auscultation - no rales, rhonchi or wheezes  CV: regular rate and rhythm, normal S1 S2, no S3 " or S4, no murmur, click or rub, no peripheral edema and peripheral pulses strong  ABDOMEN: soft, nontender, no hepatosplenomegaly, no masses and bowel sounds normal  MS: no gross musculoskeletal defects noted, no edema        ASSESSMENT/PLAN:   ASSESSMENT / PLAN:  (M54.9) Acute bilateral back pain, unspecified back location  (primary encounter diagnosis)  Comment: has been for 1-2 weeks after flu like sx started, has no change in output, has no blood in urine but has decreased urinating,   Plan: tamsulosin (FLOMAX) 0.4 MG capsule,         HYDROcodone-acetaminophen (NORCO) 5-325 MG per         tablet        Will review the lab and update pt       (R10.9) Flank pain  Comment: has bilateral flank pain with low back pain, has no radiculopathy, will have him to try flomax for possible urinary stone and pain med for control back pain together   Plan: UA reflex to Microscopic and Culture,         tamsulosin (FLOMAX) 0.4 MG capsule,         HYDROcodone-acetaminophen (NORCO) 5-325 MG per         tablet            (R30.0) Dysuria  Comment: mentioned above   Plan: tamsulosin (FLOMAX) 0.4 MG capsule            (R68.89) Flu-like symptoms  Comment: has been improving but very slowly, will have him to keep taking a rest for now until 10-7-17, and ok to return to work without restriction thereafter   Plan: mentioned above         Xander Lee MD  [facility]

## 2017-10-04 NOTE — NURSING NOTE
"Chief Complaint   Patient presents with     Back Pain       Initial BP (!) 158/98 (Cuff Size: Adult Large)  Pulse 81  Temp 97.5  F (36.4  C) (Tympanic)  Resp 14  Ht 5' 7\" (1.702 m)  Wt 178 lb (80.7 kg)  SpO2 98%  BMI 27.88 kg/m2 Estimated body mass index is 27.88 kg/(m^2) as calculated from the following:    Height as of this encounter: 5' 7\" (1.702 m).    Weight as of this encounter: 178 lb (80.7 kg).  Medication Reconciliation: complete   Elayne Wilburn, CMA    "

## 2017-10-04 NOTE — MR AVS SNAPSHOT
After Visit Summary   10/4/2017    Vaibhav Crabtree    MRN: [id number]           Patient Information     Date Of Birth          1950        Visit Information        Provider Department      10/4/2017 1:20 PM Xander Lee MD [facility]        Today's Diagnoses     Acute bilateral back pain, unspecified back location    -  1    Screen for colon cancer        Flank pain        Dysuria        Flu-like symptoms           Follow-ups after your visit        Your next 10 appointments already scheduled     Oct 04, 2017  1:20 PM CDT   Office Visit with Xander Lee MD   [facility] ([facility])    [address] [place] [id number]   [phone]           Bring a current list of meds and any records pertaining to this visit. For Physicals, please bring immunization records and any forms needing to be filled out. Please arrive 10 minutes early to complete paperwork.              Who to contact     If you have questions or need follow up information about today's clinic visit or your schedule please contact [facility] directly at [phone].  Normal or non-critical lab and imaging results will be communicated to you by eCaringhart, letter or phone within 4 business days after the clinic has received the results. If you do not hear from us within 7 days, please contact the clinic through Revstrt or phone. If you have a critical or abnormal lab result, we will notify you by phone as soon as possible.  Submit refill requests through [employer] or call your pharmacy and they will forward the refill request to us. Please allow 3 business days for your refill to be completed.          Additional Information About Your Visit        eCaringhart Information     [employer] lets you send messages to your doctor, view your test results, renew your prescriptions, schedule appointments and more. To sign up, go to [email or web address] .  "Click on \"Log in\" on the left side of the screen, which will take you to the Welcome page. Then click on \"Sign up Now\" on the right side of the page.     You will be asked to enter the access code listed below, as well as some personal information. Please follow the directions to create your username and password.     Your access code is: [email or web address]  Expires: 2018  1:18 PM     Your access code will  in 90 days. If you need help or a new code, please call your [place] clinic or [phone].        Care EveryWhere ID     This is your Care EveryWhere ID. This could be used by other organizations to access your [place] medical records  [id number]        Your Vitals Were     Pulse Temperature Respirations Height Pulse Oximetry BMI (Body Mass Index)    81 97.5  F (36.4  C) (Tympanic) 14 5' 7\" (1.702 m) 98% 27.88 kg/m2       Blood Pressure from Last 3 Encounters:   10/04/17 (!) 158/98   17 134/82   17 (!) 146/92    Weight from Last 3 Encounters:   10/04/17 178 lb (80.7 kg)   17 168 lb (76.2 kg)   17 166 lb (75.3 kg)              We Performed the Following     Basic metabolic panel  (Ca, Cl, CO2, Creat, Gluc, K, Na, BUN)     CBC with platelets and differential     UA reflex to Microscopic and Culture          Today's Medication Changes          These changes are accurate as of: 10/4/17  1:18 PM.  If you have any questions, ask your nurse or doctor.               Start taking these medicines.        Dose/Directions    HYDROcodone-acetaminophen 5-325 MG per tablet   Commonly known as:  NORCO   Used for:  Flank pain, Acute bilateral back pain, unspecified back location   Started by:  Xander Lee MD        Dose:  1-2 tablet   Take 1-2 tablets by mouth every 8 hours as needed for moderate to severe pain maximum 3 tablet(s) per day   Quantity:  15 tablet   Refills:  0       tamsulosin 0.4 MG capsule   Commonly known as:  FLOMAX   Used for:  Flank pain, Acute bilateral back pain, " unspecified back location, Dysuria   Started by:  Xander Lee MD        Dose:  0.4 mg   Take 1 capsule (0.4 mg) by mouth daily   Quantity:  15 capsule   Refills:  0            Where to get your medicines      These medications were sent to [id number] - [place], [place] - [address]  [address] [address] [facility] [place] [facility] [address]     Phone:  [phone]     tamsulosin 0.4 MG capsule         Some of these will need a paper prescription and others can be bought over the counter.  Ask your nurse if you have questions.     Bring a paper prescription for each of these medications     HYDROcodone-acetaminophen 5-325 MG per tablet                Primary Care Provider Office Phone # Fax #    Xander Lee [phone] [address] [facility]  [place] [facility] [address]        Equal Access to Services     BENJAMIN [facility] : Hadii monalisa dow hadasho Soomaali, waaxda luqadaha, qaybta kaalmada adeegyada, kemar lott hayronnie lobo . So [facility] [phone].    ATENCIÓN: Si habla español, tiene a hicks disposición servicios gratuitos de asistencia lingüística. LlFisher-Titus Medical Center [phone].    We comply with applicable federal civil rights laws and [place] laws. We do not discriminate on the basis of race, color, national origin, age, disability, sex, sexual orientation, or gender identity.            Thank you!     [facility]. Our goal is always to provide you with excellent care. Hearing back from our patients is one way we can continue to improve our services. Please take a few minutes to complete the written survey that you may receive in the mail after your visit with us. Thank you!             Your Updated Medication List - Protect others around you: Learn how to safely use, store and throw away your medicines at www.disposemymeds.org.          This list is accurate as of: 10/4/17  1:18 PM.  Always use your most recent med list.                    Brand Name Dispense Instructions for use Diagnosis    COREG PO      Take 25 mg by mouth 2 times daily        ferrous gluconate 324 (38 FE) MG tablet    FERGON    60 tablet    Take 1 tablet (324 mg) by mouth daily (with breakfast)    Acute venous embolism and thrombosis of deep vessels of distal lower extremity, right (H), Ulcer of esophagus without bleeding, S/P TKR (total knee replacement) not using cement, right       FUROSEMIDE PO      Take 20 mg by mouth daily        HYDROcodone-acetaminophen 5-325 MG per tablet    NORCO    15 tablet    Take 1-2 tablets by mouth every 8 hours as needed for moderate to severe pain maximum 3 tablet(s) per day    Flank pain, Acute bilateral back pain, unspecified back location       lisinopril 20 MG tablet    PRINIVIL/ZESTRIL    90 tablet    TAKE 1 TABLET BY MOUTH DAILY    Benign essential hypertension       tamsulosin 0.4 MG capsule    FLOMAX    15 capsule    Take 1 capsule (0.4 mg) by mouth daily    Flank pain, Acute bilateral back pain, unspecified back location, Dysuria       * XARELTO 15 MG Tabs tablet   Generic drug:  rivaroxaban ANTICOAGULANT      Take 15 mg by mouth 2 times daily (with meals)        * rivaroxaban ANTICOAGULANT 20 MG Tabs tablet    XARELTO    90 tablet    Take 1 tablet (20 mg) by mouth daily (with dinner)    Deep vein thrombosis (DVT) of distal vein of lower extremity, unspecified chronicity, unspecified laterality (H)       * Notice:  This list has 2 medication(s) that are the same as other medications prescribed for you. Read the directions carefully, and ask your doctor or other care provider to review them with you.

## 2017-10-05 ENCOUNTER — TELEPHONE (OUTPATIENT)
Dept: FAMILY MEDICINE | Facility: CLINIC | Age: 67
End: 2017-10-05

## 2017-10-05 LAB
ANION GAP SERPL CALCULATED.3IONS-SCNC: 11 MMOL/L (ref 3–14)
BUN SERPL-MCNC: 19 MG/DL (ref 7–30)
CALCIUM SERPL-MCNC: 8.4 MG/DL (ref 8.5–10.1)
CHLORIDE SERPL-SCNC: 108 MMOL/L (ref 94–109)
CO2 SERPL-SCNC: 24 MMOL/L (ref 20–32)
CREAT SERPL-MCNC: 1.22 MG/DL (ref 0.66–1.25)
GFR SERPL CREATININE-BSD FRML MDRD: 59 ML/MIN/1.7M2
GLUCOSE SERPL-MCNC: 113 MG/DL (ref 70–99)
POTASSIUM SERPL-SCNC: 3.7 MMOL/L (ref 3.4–5.3)
SODIUM SERPL-SCNC: 143 MMOL/L (ref 133–144)

## 2017-10-16 DIAGNOSIS — R10.9 FLANK PAIN: ICD-10-CM

## 2017-10-16 DIAGNOSIS — M54.9 ACUTE BILATERAL BACK PAIN, UNSPECIFIED BACK LOCATION: ICD-10-CM

## 2017-10-16 DIAGNOSIS — R30.0 DYSURIA: ICD-10-CM

## 2017-10-16 RX ORDER — TAMSULOSIN HYDROCHLORIDE 0.4 MG/1
CAPSULE ORAL
Qty: 15 CAPSULE | Refills: 0 | Status: SHIPPED | OUTPATIENT
Start: 2017-10-16 | End: 2020-03-18

## 2017-10-16 NOTE — TELEPHONE ENCOUNTER
Routing refill request to provider for review/approval because:  Prescribed for flank pain noted possible stone.  Continue to prescribe?  Daria Gamboa RN - Triage  [facility]

## 2017-10-16 NOTE — TELEPHONE ENCOUNTER
Flomax         Last Written Prescription Date: 10/4/17  Last Fill Quantity: 15, # refills: 0    Last Office Visit with [facility] or [facility] prescribing provider:  10/4/17   Future Office Visit:      BP Readings from Last 3 Encounters:   10/04/17 (!) 158/98   06/05/17 134/82   05/29/17 (!) 146/92     Corrina Villa CMA

## 2017-11-03 DIAGNOSIS — I10 BENIGN ESSENTIAL HYPERTENSION: ICD-10-CM

## 2017-11-03 RX ORDER — LISINOPRIL 20 MG/1
TABLET ORAL
Qty: 90 TABLET | Refills: 0 | Status: SHIPPED | OUTPATIENT
Start: 2017-11-03 | End: 2018-03-03

## 2017-11-03 NOTE — TELEPHONE ENCOUNTER
Routing refill request to provider for review/approval because:  Labs out of range:  BP elevated  Daria Gamboa RN - Triage  [facility]

## 2017-11-08 ENCOUNTER — OFFICE VISIT (OUTPATIENT)
Dept: FAMILY MEDICINE | Facility: CLINIC | Age: 67
End: 2017-11-08

## 2017-11-08 VITALS
HEIGHT: 67 IN | HEART RATE: 74 BPM | TEMPERATURE: 98.7 F | OXYGEN SATURATION: 98 % | WEIGHT: 176 LBS | SYSTOLIC BLOOD PRESSURE: 146 MMHG | RESPIRATION RATE: 16 BRPM | DIASTOLIC BLOOD PRESSURE: 87 MMHG | BODY MASS INDEX: 27.62 KG/M2

## 2017-11-08 DIAGNOSIS — I50.9 CONGESTIVE HEART FAILURE, UNSPECIFIED CONGESTIVE HEART FAILURE CHRONICITY, UNSPECIFIED CONGESTIVE HEART FAILURE TYPE: ICD-10-CM

## 2017-11-08 DIAGNOSIS — I42.9 IDIOPATHIC CARDIOMYOPATHY (H): ICD-10-CM

## 2017-11-08 DIAGNOSIS — F43.29 ADJUSTMENT DISORDER WITH OTHER SYMPTOM: Primary | ICD-10-CM

## 2017-11-08 DIAGNOSIS — I10 ESSENTIAL HYPERTENSION: ICD-10-CM

## 2017-11-08 DIAGNOSIS — M17.11 ARTHRITIS OF KNEE, RIGHT: ICD-10-CM

## 2017-11-08 PROCEDURE — 99214 OFFICE O/P EST MOD 30 MIN: CPT | Performed by: FAMILY MEDICINE

## 2017-11-08 NOTE — NURSING NOTE
"Chief Complaint   Patient presents with     Return to Work Clearance       Initial BP (!) 166/103 (Cuff Size: Adult Regular)  Pulse 74  Temp 98.7  F (37.1  C) (Oral)  Resp 16  Ht 5' 7\" (1.702 m)  Wt 176 lb (79.8 kg)  SpO2 98%  BMI 27.57 kg/m2 Estimated body mass index is 27.57 kg/(m^2) as calculated from the following:    Height as of this encounter: 5' 7\" (1.702 m).    Weight as of this encounter: 176 lb (79.8 kg).  Medication Reconciliation: complete   Elayne Wilburn, CMA    "

## 2017-11-08 NOTE — MR AVS SNAPSHOT
"              After Visit Summary   11/8/2017    Vaibhav Crabtree    MRN: [id number]           Patient Information     Date Of Birth          1950        Visit Information        Provider Department      11/8/2017 11:00 AM Xander Lee MD [facility]        Today's Diagnoses     Adjustment disorder with other symptom    -  1    Arthritis of knee, right        Idiopathic cardiomyopathy (H)        Congestive heart failure, unspecified congestive heart failure chronicity, unspecified congestive heart failure type (H)        Essential hypertension           Follow-ups after your visit        Who to contact     If you have questions or need follow up information about today's clinic visit or your schedule please contact [facility] directly at [phone].  Normal or non-critical lab and imaging results will be communicated to you by MyChart, letter or phone within 4 business days after the clinic has received the results. If you do not hear from us within 7 days, please contact the clinic through MyChart or phone. If you have a critical or abnormal lab result, we will notify you by phone as soon as possible.  Submit refill requests through [employer] or call your pharmacy and they will forward the refill request to us. Please allow 3 business days for your refill to be completed.          Additional Information About Your Visit        MyChart Information     [employer] lets you send messages to your doctor, view your test results, renew your prescriptions, schedule appointments and more. To sign up, go to [email or web address] . Click on \"Log in\" on the left side of the screen, which will take you to the Welcome page. Then click on \"Sign up Now\" on the right side of the page.     You will be asked to enter the access code listed below, as well as some personal information. Please follow the directions to create your username and password.     Your access code is: " [email or web address]  Expires: 2018 12:18 PM     Your access code will  in 90 days. If you need help or a new code, please call your [place] clinic or [phone].        Care EveryWhere ID     This is your Care EveryWhere ID. This could be used by other organizations to access your [place] medical records  [id number]        Your Vitals Were     Pulse Temperature Respirations Height Pulse Oximetry BMI (Body Mass Index)    74 98.7  F (37.1  C) (Oral) 16 5' 7\" (1.702 m) 98% 27.57 kg/m2       Blood Pressure from Last 3 Encounters:   17 146/87   10/04/17 (!) 158/98   17 134/82    Weight from Last 3 Encounters:   17 176 lb (79.8 kg)   10/04/17 178 lb (80.7 kg)   17 168 lb (76.2 kg)              Today, you had the following     No orders found for display       Primary Care Provider Office Phone # Fax #    Xander Lee [phone]       [address] [place], [place] [id number]        Equal Access to Services     CEDRICK LARSON AH: Hadii monalisa lunao Somarichuy, waaxda luqadaha, qaybta kaalmada adeegyada, kemar fountain. So [facility] [phone].    ATENCIÓN: Si habla español, tiene a hicks disposición servicios gratuitos de asistencia lingüística. Llame al [phone].    We comply with applicable federal civil rights laws and [place] laws. We do not discriminate on the basis of race, color, national origin, age, disability, sex, sexual orientation, or gender identity.            Thank you!     Thank you for choosing [facility]  for your care. Our goal is always to provide you with excellent care. Hearing back from our patients is one way we can continue to improve our services. Please take a few minutes to complete the written survey that you may receive in the mail after your visit with us. Thank you!             Your Updated Medication List - Protect others around you: Learn how to safely use, store and throw away your medicines " at www.disposemymeds.org.          This list is accurate as of: 11/8/17 11:14 AM.  Always use your most recent med list.                   Brand Name Dispense Instructions for use Diagnosis    COREG PO      Take 25 mg by mouth 2 times daily        ferrous gluconate 324 (38 FE) MG tablet    FERGON    60 tablet    Take 1 tablet (324 mg) by mouth daily (with breakfast)    Acute venous embolism and thrombosis of deep vessels of distal lower extremity, right (H), Ulcer of esophagus without bleeding, S/P TKR (total knee replacement) not using cement, right       FUROSEMIDE PO      Take 20 mg by mouth daily        lisinopril 20 MG tablet    PRINIVIL/ZESTRIL    90 tablet    TAKE 1 TABLET BY MOUTH DAILY    Benign essential hypertension       tamsulosin 0.4 MG capsule    FLOMAX    15 capsule    TAKE 1 CAPSULE (0.4 MG) BY MOUTH DAILY    Flank pain, Acute bilateral back pain, unspecified back location, Dysuria       * XARELTO 15 MG Tabs tablet   Generic drug:  rivaroxaban ANTICOAGULANT      Take 15 mg by mouth 2 times daily (with meals)        * rivaroxaban ANTICOAGULANT 20 MG Tabs tablet    XARELTO    90 tablet    Take 1 tablet (20 mg) by mouth daily (with dinner)    Deep vein thrombosis (DVT) of distal vein of lower extremity, unspecified chronicity, unspecified laterality (H)       * Notice:  This list has 2 medication(s) that are the same as other medications prescribed for you. Read the directions carefully, and ask your doctor or other care provider to review them with you.

## 2017-11-08 NOTE — LETTER
[facility]  [address] [place] [id number]  Phone: [phone]    November 8, 2017        Vaibhav Crabtree  [address] [place] [id number]          To whom it may concern:    RE: Vaibhav Crabtree    Patient has been seen and treated at our clinic for his current health issue.  Patient may return to work 11-13-17 without any restriction.    Please contact me for questions or concerns.      Sincerely,        Xander Lee MD

## 2017-11-08 NOTE — PROGRESS NOTES
SUBJECTIVE:   Vaibhav Crabtree is a 67 year old male who presents to clinic today for the following health issues:      Return to Work Clearance         Description (location/character/radiation): Pt needs a return to work clearance.          Problem list and histories reviewed & adjusted, as indicated.  Additional history: as documented    Patient Active Problem List   Diagnosis     Dyspnea and respiratory abnormality     Impotence of organic origin     Adjustment reaction     CARDIOVASCULAR SCREENING; LDL GOAL LESS THAN 130     Pain, joint, knee, right     Advanced directives, counseling/discussion     Arthritis of knee, right     Unstable angina (H)     Idiopathic cardiomyopathy (H)     Congestive heart failure (H)     Hypertension     Hyperlipidemia     CAD (coronary artery disease)     Mitral regurgitation     Atypical chest pain     Claudication of right lower extremity (H)     RAYSHAWN (acute kidney injury) (H)     Orthostatic hypotension     Anemia, unspecified     Intestinal malabsorption, unspecified     Iron adverse reaction     Need for prophylactic measure     S/P TKR (total knee replacement) not using cement, right     Past Surgical History:   Procedure Laterality Date     ARTHROPLASTY KNEE Right 5/24/2017    Procedure: ARTHROPLASTY KNEE;  RIGHT TOTAL KNEE ARTHROPLASTY (BIOMET)^ ;  Surgeon: Marco A Iyer MD;  Location:  OR     C NONSPECIFIC PROCEDURE  2002    ulnar nerve     C NONSPECIFIC PROCEDURE      right rotator cuff repair     ESOPHAGOSCOPY, GASTROSCOPY, DUODENOSCOPY (EGD), COMBINED N/A 1/24/2017    Procedure: COMBINED ESOPHAGOSCOPY, GASTROSCOPY, DUODENOSCOPY (EGD), BIOPSY SINGLE OR MULTIPLE;  Surgeon: Reji Mcghee MD;  Location:  GI     ESOPHAGOSCOPY, GASTROSCOPY, DUODENOSCOPY (EGD), COMBINED N/A 5/26/2017    Procedure: COMBINED ESOPHAGOSCOPY, GASTROSCOPY, DUODENOSCOPY (EGD);  gastroscopy;  Surgeon: Renae Mendosa MD;  Location:  GI     HC REMOVAL OF TONSILS,<9 Y/O      Tonsils  <10y.o.     HC REPAIR ROTATOR CUFF,ACUTE  02 and 03    left rotator cuff repair       Social History   Substance Use Topics     Smoking status: Former Smoker     Packs/day: 4.00     Years: 15.00     Types: Cigarettes     Quit date: 6/20/1971     Smokeless tobacco: Never Used     Alcohol use 0.0 oz/week     0 Standard drinks or equivalent per week      Comment: caitlin maloney     Family History   Problem Relation Age of Onset     Respiratory Father      COPD     Unknown/Adopted Mother      CANCER Brother      CANCER Brother      Connective Tissue Disorder Paternal Grandfather      Depression Daughter      Eye Disorder Son          Current Outpatient Prescriptions   Medication Sig Dispense Refill     lisinopril (PRINIVIL/ZESTRIL) 20 MG tablet TAKE 1 TABLET BY MOUTH DAILY 90 tablet 0     tamsulosin (FLOMAX) 0.4 MG capsule TAKE 1 CAPSULE (0.4 MG) BY MOUTH DAILY 15 capsule 0     ferrous gluconate (FERGON) 324 (38 FE) MG tablet Take 1 tablet (324 mg) by mouth daily (with breakfast) 60 tablet 0     FUROSEMIDE PO Take 20 mg by mouth daily       Carvedilol (COREG PO) Take 25 mg by mouth 2 times daily        rivaroxaban ANTICOAGULANT (XARELTO) 20 MG TABS tablet Take 1 tablet (20 mg) by mouth daily (with dinner) (Patient not taking: Reported on 10/4/2017) 90 tablet 1     rivaroxaban ANTICOAGULANT (XARELTO) 15 MG TABS tablet Take 15 mg by mouth 2 times daily (with meals)       Allergies   Allergen Reactions     No Known Allergies      Recent Labs   Lab Test  10/04/17   1422  05/28/17   0630  05/27/17   0700   04/16/17   0322   01/20/17   0957   01/02/17   0956   01/12/15   0525  01/11/15   1240   05/28/14   0605   05/27/14   0255   LDL   --    --    --    --    --    --    --    --    --    --   96   --    --   69   --    --    HDL   --    --    --    --    --    --    --    --    --    --   77   --    --   109   --    --    TRIG   --    --    --    --    --    --    --    --    --    --   130   --    --   130   --    --    ALT   --  "   --    --    --   31   --   26   --   28   < >  34  43   --    --    --    --    CR  1.22  0.94  1.25   < >  2.06*   < >  1.61*   < >  3.92*   < >  1.13  1.21   < >   --    < >  1.11   GFRESTIMATED  59*  79  57*   < >  32*   < >  43*   < >  15*   < >  65  60*   < >   --    < >  66   GFRESTBLACK  71  >90   GFR Calc    69   < >  39*   < >  52*   < >  19*   < >  78  72   < >   --    < >  80   POTASSIUM  3.7   --   3.7   < >  3.2*   < >  3.9   < >  3.2*   < >  4.3  4.0   < >  3.7   < >   --    TSH   --    --    --    --    --    --    --    --    --    --    --   1.90   --    --    --   6.58*    < > = values in this interval not displayed.      BP Readings from Last 3 Encounters:   11/08/17 146/87   10/04/17 (!) 158/98   06/05/17 134/82    Wt Readings from Last 3 Encounters:   11/08/17 176 lb (79.8 kg)   10/04/17 178 lb (80.7 kg)   06/05/17 168 lb (76.2 kg)                          Reviewed and updated as needed this visit by clinical staff     Reviewed and updated as needed this visit by Provider         ROS:  Constitutional, HEENT, cardiovascular, pulmonary, gi and gu systems are negative, except as otherwise noted.      OBJECTIVE:   /87 (Cuff Size: Adult Regular)  Pulse 74  Temp 98.7  F (37.1  C) (Oral)  Resp 16  Ht 5' 7\" (1.702 m)  Wt 176 lb (79.8 kg)  SpO2 98%  BMI 27.57 kg/m2  Body mass index is 27.57 kg/(m^2).  GENERAL: healthy, alert and no distress  NECK: no adenopathy, no asymmetry, masses, or scars and thyroid normal to palpation  RESP: lungs clear to auscultation - no rales, rhonchi or wheezes  CV: regular rate and rhythm, normal S1 S2, no S3 or S4, no murmur, click or rub, no peripheral edema and peripheral pulses strong  ABDOMEN: soft, nontender, no hepatosplenomegaly, no masses and bowel sounds normal  MS: no gross musculoskeletal defects noted, no edema        ASSESSMENT/PLAN:   Vaibhav was seen today for return to work clearance.    Diagnoses and all orders for this " visit:    Adjustment disorder with other symptom    Arthritis of knee, right    Idiopathic cardiomyopathy (H)    Congestive heart failure, unspecified congestive heart failure chronicity, unspecified congestive heart failure type (H)    Essential hypertension    Other orders  -     Cancel: Hepatitis C Screen Reflex to HCV RNA Quant and Genotype  -     Cancel: Lipid panel reflex to direct LDL Fasting  -     Cancel: GASTROENTEROLOGY ADULT REF PROCEDURE ONLY      His general condition has been improving, VS is stable, and his I&O are good, has baseline stamina without clinical deterioration  Has stable cardiopulmonic status, knee pain is tolerable with current regimen of pain control   His emotional status is stable as well  Will keep watching sx , ok to return to full duty without restriction, letter for his work written          Xander Lee MD  [facility]

## 2017-11-21 ENCOUNTER — TELEPHONE (OUTPATIENT)
Dept: FAMILY MEDICINE | Facility: CLINIC | Age: 67
End: 2017-11-21

## 2018-03-03 DIAGNOSIS — I10 BENIGN ESSENTIAL HYPERTENSION: ICD-10-CM

## 2018-03-05 RX ORDER — FUROSEMIDE 20 MG
TABLET ORAL
Qty: 90 TABLET | Refills: 9 | Status: SHIPPED | OUTPATIENT
Start: 2018-03-05 | End: 2018-03-05

## 2018-03-05 RX ORDER — LISINOPRIL 20 MG/1
TABLET ORAL
Qty: 90 TABLET | Refills: 0 | Status: SHIPPED | OUTPATIENT
Start: 2018-03-05 | End: 2018-06-08

## 2018-03-05 RX ORDER — FUROSEMIDE 20 MG
TABLET ORAL
Qty: 90 TABLET | Refills: 9 | Status: SHIPPED | OUTPATIENT
Start: 2018-03-05 | End: 2019-05-22

## 2018-03-05 NOTE — TELEPHONE ENCOUNTER
"Requested Prescriptions   Pending Prescriptions Disp Refills     lisinopril (PRINIVIL/ZESTRIL) 20 MG tablet [Pharmacy Med Name: Lisinopril Oral Tablet 20 MG] 90 tablet 0     Sig: TAKE 1 TABLET BY MOUTH DAILY    ACE Inhibitors (Including Combos) Protocol Failed    3/3/2018  9:55 AM       Failed - Blood pressure under 140/90 in past 12 months    BP Readings from Last 3 Encounters:   11/08/17 146/87   10/04/17 (!) 158/98 06/05/17 134/82                Passed - Recent (12 mo) or future (30 days) visit within the authorizing provider's specialty    Patient had office visit in the last year or has a visit in the next 30 days with authorizing provider.  See \"Patient Info\" tab in inbasket, or \"Choose Columns\" in Meds & Orders section of the refill encounter.            Passed - Patient is age 18 or older       Passed - Normal serum creatinine on file in past 12 months    Recent Labs   Lab Test  10/04/17   1422   CR  1.22            Passed - Normal serum potassium on file in past 12 months    Recent Labs   Lab Test  10/04/17   1422   POTASSIUM  3.7             furosemide (LASIX) 20 MG tablet [Pharmacy Med Name: Furosemide Oral Tablet 20 MG] 90 tablet 9     Sig: TAKE 1 TABLET BY MOUTH EVERY DAY AS NEEDED    Diuretics (Including Combos) Protocol Failed    3/3/2018  9:55 AM       Failed - Blood pressure under 140/90 in past 12 months    BP Readings from Last 3 Encounters:   11/08/17 146/87   10/04/17 (!) 158/98 06/05/17 134/82                Passed - Recent (12 mo) or future (30 days) visit within the authorizing provider's specialty    Patient had office visit in the last year or has a visit in the next 30 days with authorizing provider.  See \"Patient Info\" tab in inbasket, or \"Choose Columns\" in Meds & Orders section of the refill encounter.            Passed - Patient is age 18 or older       Passed - Normal serum creatinine on file in past 12 months    Recent Labs   Lab Test  10/04/17   1422   CR  1.22             " Passed - Normal serum potassium on file in past 12 months    Recent Labs   Lab Test  10/04/17   1422   POTASSIUM  3.7                   Passed - Normal serum sodium on file in past 12 months    Recent Labs   Lab Test  10/04/17   1422   NA  143

## 2018-03-05 NOTE — TELEPHONE ENCOUNTER
"Refill for furosemide cancelled (I called and cancelled at pharmacy)- provider needs to address- cardiology note from 05/2017 says no diuretic needed    05/15/2017: Impression/plan  1-history of systolic congestive heart failure, resolved with treatment of his idiopathic cardiomyopathy.  No evidence for heart failure currently.  On appropriate medications.  Agree probably does not date his diuretic at this point in time as he tends to get prerenal and over diuresed with fast.       Requested Prescriptions   Pending Prescriptions Disp Refills     furosemide (LASIX) 20 MG tablet 90 tablet 9     Sig: TAKE 1 TABLET BY MOUTH EVERY DAY AS NEEDED    There is no refill protocol information for this order      Signed Prescriptions Disp Refills     lisinopril (PRINIVIL/ZESTRIL) 20 MG tablet 90 tablet 0     Sig: TAKE 1 TABLET BY MOUTH DAILY    ACE Inhibitors (Including Combos) Protocol Failed    3/3/2018  9:55 AM       Failed - Blood pressure under 140/90 in past 12 months    BP Readings from Last 3 Encounters:   11/08/17 146/87   10/04/17 (!) 158/98   06/05/17 134/82                Passed - Recent (12 mo) or future (30 days) visit within the authorizing provider's specialty    Patient had office visit in the last year or has a visit in the next 30 days with authorizing provider.  See \"Patient Info\" tab in inbasket, or \"Choose Columns\" in Meds & Orders section of the refill encounter.            Passed - Patient is age 18 or older       Passed - Normal serum creatinine on file in past 12 months    Recent Labs   Lab Test  10/04/17   1422   CR  1.22            Passed - Normal serum potassium on file in past 12 months    Recent Labs   Lab Test  10/04/17   1422   POTASSIUM  3.7             furosemide (LASIX) 20 MG tablet 90 tablet 9     Sig: TAKE 1 TABLET BY MOUTH EVERY DAY AS NEEDED    Diuretics (Including Combos) Protocol Failed    3/3/2018  9:55 AM       Failed - Blood pressure under 140/90 in past 12 months    BP Readings from " "Last 3 Encounters:   11/08/17 146/87   10/04/17 (!) 158/98   06/05/17 134/82                Passed - Recent (12 mo) or future (30 days) visit within the authorizing provider's specialty    Patient had office visit in the last year or has a visit in the next 30 days with authorizing provider.  See \"Patient Info\" tab in inbasket, or \"Choose Columns\" in Meds & Orders section of the refill encounter.            Passed - Patient is age 18 or older       Passed - Normal serum creatinine on file in past 12 months    Recent Labs   Lab Test  10/04/17   1422   CR  1.22             Passed - Normal serum potassium on file in past 12 months    Recent Labs   Lab Test  10/04/17   1422   POTASSIUM  3.7                   Passed - Normal serum sodium on file in past 12 months    Recent Labs   Lab Test  10/04/17   1422   NA  143                "

## 2018-03-12 ENCOUNTER — TELEPHONE (OUTPATIENT)
Dept: FAMILY MEDICINE | Facility: CLINIC | Age: 68
End: 2018-03-12

## 2018-03-12 NOTE — TELEPHONE ENCOUNTER
Patient requesting a letter stating he has been under Dr. lee's care from 11/133/17 until now for his work ([employer]), he admitted himself to detox and wanted to let Dr. Lee know.   Please advise.  [phone] (home)   Thank you  Ruby Castillo

## 2018-03-12 NOTE — LETTER
March 13, 2018      Vaibhav Crabtree  [address] [id number]        To Whom It May Concern,      Vaibhav Crabtree has been a patient under my care since 11/13/17. Please feel free to contact me at [phone] if you have any question.          Sincerely,        Xander Lee MD

## 2018-03-13 NOTE — TELEPHONE ENCOUNTER
Letter written and in the team 3 TC shelf on the wall. Waiting call back for direction as to what to do with the letter.  Alla Hawkins,

## 2018-03-13 NOTE — TELEPHONE ENCOUNTER
Dr Lee please see note below. Letter attached, please make any necessary changes. Thank you.  Alla Hawkins,

## 2018-06-08 DIAGNOSIS — I50.9 ACUTE CONGESTIVE HEART FAILURE, UNSPECIFIED CONGESTIVE HEART FAILURE TYPE: Primary | ICD-10-CM

## 2018-06-08 DIAGNOSIS — I10 BENIGN ESSENTIAL HYPERTENSION: ICD-10-CM

## 2018-06-08 RX ORDER — LISINOPRIL 20 MG/1
TABLET ORAL
Qty: 90 TABLET | Refills: 0 | Status: SHIPPED | OUTPATIENT
Start: 2018-06-08 | End: 2018-08-20

## 2018-06-08 RX ORDER — CARVEDILOL 25 MG/1
25 TABLET ORAL 2 TIMES DAILY
Qty: 180 TABLET | Refills: 0 | Status: SHIPPED | OUTPATIENT
Start: 2018-06-08 | End: 2018-10-30

## 2018-06-08 NOTE — TELEPHONE ENCOUNTER
Routing refill request to provider for review/approval because:  BP is elevated    Maria Luisa Shea RN- Triage [employer]

## 2018-06-08 NOTE — TELEPHONE ENCOUNTER
"Requested Prescriptions   Pending Prescriptions Disp Refills     lisinopril (PRINIVIL/ZESTRIL) 20 MG tablet [Pharmacy Med Name: Lisinopril Oral Tablet 20 MG]  Last Written Prescription Date:  3/5/18  Last Fill Quantity: 90,  # refills: 0   Last office visit: 11/8/2017 with prescribing provider:  Jesus   Future Office Visit:     90 tablet 0     Sig: TAKE ONE TABLET BY MOUTH ONE TIME DAILY    ACE Inhibitors (Including Combos) Protocol Failed    6/8/2018  7:59 AM       Failed - Blood pressure under 140/90 in past 12 months    BP Readings from Last 3 Encounters:   11/08/17 146/87   10/04/17 (!) 158/98   06/05/17 134/82                Passed - Recent (12 mo) or future (30 days) visit within the authorizing provider's specialty    Patient had office visit in the last 12 months or has a visit in the next 30 days with authorizing provider or within the authorizing provider's specialty.  See \"Patient Info\" tab in inbasket, or \"Choose Columns\" in Meds & Orders section of the refill encounter.           Passed - Patient is age 18 or older       Passed - Normal serum creatinine on file in past 12 months    Recent Labs   Lab Test  10/04/17   1422   CR  1.22            Passed - Normal serum potassium on file in past 12 months    Recent Labs   Lab Test  10/04/17   1422   POTASSIUM  3.7               "

## 2018-08-20 DIAGNOSIS — I10 BENIGN ESSENTIAL HYPERTENSION: ICD-10-CM

## 2018-08-20 RX ORDER — LISINOPRIL 20 MG/1
TABLET ORAL
Qty: 30 TABLET | Refills: 0 | Status: SHIPPED | OUTPATIENT
Start: 2018-08-20 | End: 2019-04-01

## 2018-08-20 NOTE — TELEPHONE ENCOUNTER
"Requested Prescriptions   Pending Prescriptions Disp Refills     lisinopril (PRINIVIL/ZESTRIL) 20 MG tablet [Pharmacy Med Name: Lisinopril Oral Tablet 20 MG]  Last Written Prescription Date:  6/8/18  Last Fill Quantity: 90,  # refills: 0   Last office visit: 11/8/2017 with prescribing provider:  astrid   Future Office Visit:     90 tablet 0     Sig: TAKE ONE TABLET BY MOUTH ONE TIME DAILY    ACE Inhibitors (Including Combos) Protocol Failed    8/20/2018  8:14 AM       Failed - Blood pressure under 140/90 in past 12 months    BP Readings from Last 3 Encounters:   11/08/17 146/87   10/04/17 (!) 158/98   06/05/17 134/82                Passed - Recent (12 mo) or future (30 days) visit within the authorizing provider's specialty    Patient had office visit in the last 12 months or has a visit in the next 30 days with authorizing provider or within the authorizing provider's specialty.  See \"Patient Info\" tab in inbasket, or \"Choose Columns\" in Meds & Orders section of the refill encounter.           Passed - Patient is age 18 or older       Passed - Normal serum creatinine on file in past 12 months    Recent Labs   Lab Test  10/04/17   1422   CR  1.22            Passed - Normal serum potassium on file in past 12 months    Recent Labs   Lab Test  10/04/17   1422   POTASSIUM  3.7               "

## 2018-08-20 NOTE — TELEPHONE ENCOUNTER
Routing refill request to provider for review/approval because:  BP out of protocol range  Cele Villarreal RN   [facility] - Triage

## 2018-08-20 NOTE — TELEPHONE ENCOUNTER
30 day refill ordered. Please have him schedule a follow up soon with Dr. Lee.     Lulu Jhaveri MD

## 2018-10-26 DIAGNOSIS — I10 BENIGN ESSENTIAL HYPERTENSION: ICD-10-CM

## 2018-10-29 RX ORDER — LISINOPRIL 20 MG/1
TABLET ORAL
Qty: 90 TABLET | Refills: 0 | Status: SHIPPED | OUTPATIENT
Start: 2018-10-29 | End: 2019-01-18

## 2018-10-29 NOTE — TELEPHONE ENCOUNTER
"Requested Prescriptions   Pending Prescriptions Disp Refills     lisinopril (PRINIVIL/ZESTRIL) 20 MG tablet [Pharmacy Med Name: Lisinopril Oral Tablet 20 MG]  Last Written Prescription Date:  8/20/18  Last Fill Quantity: 30,  # refills: 0   Last office visit: 11/8/2017 with prescribing provider:  Jesus   Future Office Visit:     90 tablet 0     Sig: TAKE ONE TABLET BY MOUTH ONE TIME DAILY    ACE Inhibitors (Including Combos) Protocol Failed    10/26/2018  6:51 PM       Failed - Blood pressure under 140/90 in past 12 months    BP Readings from Last 3 Encounters:   11/08/17 146/87   10/04/17 (!) 158/98   06/05/17 134/82                Failed - Normal serum creatinine on file in past 12 months    Recent Labs   Lab Test  10/04/17   1422   CR  1.22            Failed - Normal serum potassium on file in past 12 months    Recent Labs   Lab Test  10/04/17   1422   POTASSIUM  3.7            Passed - Recent (12 mo) or future (30 days) visit within the authorizing provider's specialty    Patient had office visit in the last 12 months or has a visit in the next 30 days with authorizing provider or within the authorizing provider's specialty.  See \"Patient Info\" tab in inbasket, or \"Choose Columns\" in Meds & Orders section of the refill encounter.             Passed - Patient is age 18 or older          "

## 2018-10-29 NOTE — TELEPHONE ENCOUNTER
Routing refill request to provider for review/approval because:  Kelsey given x1 and patient did not follow up, please advise  Labs not current:  Creatinine, potassium  Break in medicaiton  Patient needs to be seen because it has been more than 1 year since last office visit.  Daria Gamboa RN - Triage  [facility]

## 2018-10-30 DIAGNOSIS — I10 HTN (HYPERTENSION): Primary | ICD-10-CM

## 2018-10-30 RX ORDER — CARVEDILOL 25 MG/1
25 TABLET ORAL 2 TIMES DAILY
Qty: 180 TABLET | Refills: 0 | Status: SHIPPED | OUTPATIENT
Start: 2018-10-30 | End: 2019-01-21

## 2018-11-01 DIAGNOSIS — I10 BENIGN ESSENTIAL HYPERTENSION: ICD-10-CM

## 2018-11-02 RX ORDER — LISINOPRIL 20 MG/1
TABLET ORAL
Qty: 90 TABLET | Refills: 0 | OUTPATIENT
Start: 2018-11-02

## 2018-11-02 NOTE — TELEPHONE ENCOUNTER
Rx denied to pharmacy since a new rx was sent on 10/29/18 for 3 months.    Miranda PEREZ RN  EP Triage

## 2018-11-02 NOTE — TELEPHONE ENCOUNTER
"Requested Prescriptions   Pending Prescriptions Disp Refills     lisinopril (PRINIVIL/ZESTRIL) 20 MG tablet [Pharmacy Med Name: Lisinopril Oral Tablet 20 MG]  Last Written Prescription Date:  10-  Last Fill Quantity: 90 tablet,  # refills: 0   Last office visit: 11/8/2017 with prescribing provider:     Future Office Visit:     90 tablet 0     Sig: TAKE ONE TABLET BY MOUTH ONE TIME DAILY    ACE Inhibitors (Including Combos) Protocol Failed    11/2/2018  9:43 AM       Failed - Blood pressure under 140/90 in past 12 months    BP Readings from Last 3 Encounters:   11/08/17 146/87   10/04/17 (!) 158/98   06/05/17 134/82                Failed - Normal serum creatinine on file in past 12 months    Recent Labs   Lab Test  10/04/17   1422   CR  1.22            Failed - Normal serum potassium on file in past 12 months    Recent Labs   Lab Test  10/04/17   1422   POTASSIUM  3.7            Passed - Recent (12 mo) or future (30 days) visit within the authorizing provider's specialty    Patient had office visit in the last 12 months or has a visit in the next 30 days with authorizing provider or within the authorizing provider's specialty.  See \"Patient Info\" tab in inbasket, or \"Choose Columns\" in Meds & Orders section of the refill encounter.             Passed - Patient is age 18 or older          "

## 2018-11-02 NOTE — TELEPHONE ENCOUNTER
"Last Written Prescription Date:10/29/2018  Last Fill Quantity: 90,  # refills: 0   Last office visit: 11/8/2017 with prescribing provider:  Jesus    Future Office Visit:    Requested Prescriptions   Pending Prescriptions Disp Refills     lisinopril (PRINIVIL/ZESTRIL) 20 MG tablet [Pharmacy Med Name: Lisinopril Oral Tablet 20 MG] 90 tablet 0     Sig: TAKE ONE TABLET BY MOUTH ONE TIME DAILY    ACE Inhibitors (Including Combos) Protocol Failed    11/1/2018  9:19 PM       Failed - Blood pressure under 140/90 in past 12 months    BP Readings from Last 3 Encounters:   11/08/17 146/87   10/04/17 (!) 158/98   06/05/17 134/82                Failed - Normal serum creatinine on file in past 12 months    Recent Labs   Lab Test  10/04/17   1422   CR  1.22            Failed - Normal serum potassium on file in past 12 months    Recent Labs   Lab Test  10/04/17   1422   POTASSIUM  3.7            Passed - Recent (12 mo) or future (30 days) visit within the authorizing provider's specialty    Patient had office visit in the last 12 months or has a visit in the next 30 days with authorizing provider or within the authorizing provider's specialty.  See \"Patient Info\" tab in inbasket, or \"Choose Columns\" in Meds & Orders section of the refill encounter.             Passed - Patient is age 18 or older          "

## 2019-01-18 DIAGNOSIS — I10 BENIGN ESSENTIAL HYPERTENSION: ICD-10-CM

## 2019-01-21 ENCOUNTER — TELEPHONE (OUTPATIENT)
Dept: CARDIOLOGY | Facility: CLINIC | Age: 69
End: 2019-01-21

## 2019-01-21 DIAGNOSIS — I10 ESSENTIAL HYPERTENSION: Primary | ICD-10-CM

## 2019-01-21 DIAGNOSIS — I10 HTN (HYPERTENSION): ICD-10-CM

## 2019-01-21 RX ORDER — CARVEDILOL 25 MG/1
25 TABLET ORAL 2 TIMES DAILY
Qty: 60 TABLET | Refills: 3 | Status: SHIPPED | OUTPATIENT
Start: 2019-01-21 | End: 2020-03-02

## 2019-01-21 RX ORDER — LISINOPRIL 20 MG/1
TABLET ORAL
Qty: 30 TABLET | Refills: 0 | Status: SHIPPED | OUTPATIENT
Start: 2019-01-21 | End: 2019-04-01

## 2019-01-21 NOTE — TELEPHONE ENCOUNTER
Pt can't make an office visit now because he has no insurance. Has no part B. So will you still refill his meds until he gets insurance. From Social Security.   Yamileth Mathews, Clinic Receptionist

## 2019-01-21 NOTE — TELEPHONE ENCOUNTER
"Requested Prescriptions   Pending Prescriptions Disp Refills     lisinopril (PRINIVIL/ZESTRIL) 20 MG tablet [Pharmacy Med Name: Lisinopril Oral Tablet 20 MG]  Last Written Prescription Date:  10/29/18  Last Fill Quantity: 90,  # refills: 0   Last office visit: 11/8/2017 with prescribing provider:  Jesus   Future Office Visit:     90 tablet 0     Sig: TAKE ONE TABLET BY MOUTH ONE TIME DAILY    ACE Inhibitors (Including Combos) Protocol Failed - 1/18/2019 11:21 PM       Failed - Blood pressure under 140/90 in past 12 months    BP Readings from Last 3 Encounters:   11/08/17 146/87   10/04/17 (!) 158/98   06/05/17 134/82                Failed - Recent (12 mo) or future (30 days) visit within the authorizing provider's specialty    Patient had office visit in the last 12 months or has a visit in the next 30 days with authorizing provider or within the authorizing provider's specialty.  See \"Patient Info\" tab in inbasket, or \"Choose Columns\" in Meds & Orders section of the refill encounter.             Failed - Normal serum creatinine on file in past 12 months    Recent Labs   Lab Test 10/04/17  1422   CR 1.22            Failed - Normal serum potassium on file in past 12 months    Recent Labs   Lab Test 10/04/17  1422   POTASSIUM 3.7            Passed - Medication is active on med list       Passed - Patient is age 18 or older          "

## 2019-01-21 NOTE — TELEPHONE ENCOUNTER
Patient left voice message stating no insurance and [facility] in [place] was his pharmacy of choice. Please have Dr. Lee fill his medications if able.

## 2019-01-21 NOTE — TELEPHONE ENCOUNTER
S/w pt who states he does not know when he will get insurance through social security.      Wondering if Dr. Lee would be willing to refill meds at this time?    Pharmacy pended.    Pt can be reached at [phone].    See messages below.    Miranda PEREZ RN  EP Triage

## 2019-01-21 NOTE — TELEPHONE ENCOUNTER
Received a refill request for carvedilol 25mg BID. Patient was last seen in May 2017. Attempted to contact patient to offer an office visit (Dr. Asif as retired from clinic practice) or to verify that patient has a provider to manage his medications. Left message for patient to call Team 3 with an update.

## 2019-01-21 NOTE — TELEPHONE ENCOUNTER
Patient due for fasting office visit- 30 days supply given.  Routing to team to schedule appointment     Analy Jordan RN  [facility]  [phone]

## 2019-01-23 NOTE — TELEPHONE ENCOUNTER
2nd message left fpr pt to call back :         Patient due for fasting office visit- 30 days supply given

## 2019-01-23 NOTE — TELEPHONE ENCOUNTER
Spoke with the pt and he does not have insurance. He was given the message below but at this time can't afford to come in for an OV.  Alla Hawkins,

## 2019-03-18 DIAGNOSIS — I10 BENIGN ESSENTIAL HYPERTENSION: ICD-10-CM

## 2019-03-19 NOTE — TELEPHONE ENCOUNTER
"Requested Prescriptions   Pending Prescriptions Disp Refills     lisinopril (PRINIVIL/ZESTRIL) 20 MG tablet [Pharmacy Med Name: Lisinopril Oral Tablet 20 MG] 30 tablet 0     Sig: TAKE ONE TABLET BY MOUTH ONE TIME DAILY    ACE Inhibitors (Including Combos) Protocol Failed - 3/18/2019 10:58 AM       Failed - Blood pressure under 140/90 in past 12 months    BP Readings from Last 3 Encounters:   11/08/17 146/87   10/04/17 (!) 158/98   06/05/17 134/82                Failed - Recent (12 mo) or future (30 days) visit within the authorizing provider's specialty    Patient had office visit in the last 12 months or has a visit in the next 30 days with authorizing provider or within the authorizing provider's specialty.  See \"Patient Info\" tab in inbasket, or \"Choose Columns\" in Meds & Orders section of the refill encounter.             Failed - Normal serum creatinine on file in past 12 months    Recent Labs   Lab Test 10/04/17  1422   CR 1.22            Failed - Normal serum potassium on file in past 12 months    Recent Labs   Lab Test 10/04/17  1422   POTASSIUM 3.7            Passed - Medication is active on med list       Passed - Patient is age 18 or older        lisinopril (PRINIVIL/ZESTRIL) 20 MG tablet 30 tablet 0 1/21/2019       Last Written Prescription Date:  01/21/2019  Last Fill Quantity: 30,  # refills: 0   Last office visit: 11/8/2017 with prescribing provider:  Dr. Lee  Future Office Visit:  Unknown     "

## 2019-03-19 NOTE — TELEPHONE ENCOUNTER
Routing refill request to provider for review/approval because:  Kelsey given x1 and patient did not follow up, please advise  Labs not current:  BP, CR, Potassium,   Patient needs to be seen because:  Due for fasting office visit per last refill.     Kaitlyn BOLDENN, RN   [facility]

## 2019-03-20 NOTE — TELEPHONE ENCOUNTER
Left a non detailed message for patient to return call.     Kaitlyn BOLDENN, RN   [facility]

## 2019-03-20 NOTE — TELEPHONE ENCOUNTER
Patient returning triage call. Advised patient to schedule an office visit, but he states he is on social security and has only Part A which covers only hospital appointments. He will work on his insurance and call back to schedule an appt.  Thank you  Ruby Castillo

## 2019-03-26 RX ORDER — LISINOPRIL 20 MG/1
TABLET ORAL
Qty: 30 TABLET | Refills: 0 | OUTPATIENT
Start: 2019-03-26

## 2019-03-26 NOTE — TELEPHONE ENCOUNTER
Pt called back and states he has been out of lisinopril for awhile and has 1 pill left that he is saving for Saturday.  Has an appt scheduled with Dr. Lee for Monday 4/1 and states can wait until appt to get meds.    Advised will deny rx to [facility] at this time.    Pt states understanding.    Miranda PEREZ RN  EP Triage

## 2019-03-26 NOTE — TELEPHONE ENCOUNTER
Non detailed message left for pt to return call to clinic and ask to speak with a triage nurse.    Miranda PEREZ RN  EP Triage

## 2019-04-01 ENCOUNTER — OFFICE VISIT (OUTPATIENT)
Dept: FAMILY MEDICINE | Facility: CLINIC | Age: 69
End: 2019-04-01

## 2019-04-01 VITALS
RESPIRATION RATE: 12 BRPM | BODY MASS INDEX: 27.78 KG/M2 | HEIGHT: 67 IN | OXYGEN SATURATION: 97 % | HEART RATE: 71 BPM | WEIGHT: 177 LBS | TEMPERATURE: 97.3 F | SYSTOLIC BLOOD PRESSURE: 182 MMHG | DIASTOLIC BLOOD PRESSURE: 104 MMHG

## 2019-04-01 DIAGNOSIS — I10 BENIGN ESSENTIAL HYPERTENSION: ICD-10-CM

## 2019-04-01 DIAGNOSIS — Z13.6 CARDIOVASCULAR SCREENING; LDL GOAL LESS THAN 130: ICD-10-CM

## 2019-04-01 DIAGNOSIS — I42.9 IDIOPATHIC CARDIOMYOPATHY (H): ICD-10-CM

## 2019-04-01 DIAGNOSIS — E78.2 MIXED HYPERLIPIDEMIA: ICD-10-CM

## 2019-04-01 DIAGNOSIS — I82.4Z9 DEEP VEIN THROMBOSIS (DVT) OF DISTAL VEIN OF LOWER EXTREMITY, UNSPECIFIED CHRONICITY, UNSPECIFIED LATERALITY (H): ICD-10-CM

## 2019-04-01 DIAGNOSIS — I25.10 CORONARY ARTERY DISEASE INVOLVING NATIVE CORONARY ARTERY OF NATIVE HEART WITHOUT ANGINA PECTORIS: ICD-10-CM

## 2019-04-01 DIAGNOSIS — I50.9 CONGESTIVE HEART FAILURE, UNSPECIFIED HF CHRONICITY, UNSPECIFIED HEART FAILURE TYPE (H): Primary | ICD-10-CM

## 2019-04-01 LAB
ALBUMIN SERPL-MCNC: 3.4 G/DL (ref 3.4–5)
ALP SERPL-CCNC: 67 U/L (ref 40–150)
ALT SERPL W P-5'-P-CCNC: 59 U/L (ref 0–70)
ANION GAP SERPL CALCULATED.3IONS-SCNC: 7 MMOL/L (ref 3–14)
AST SERPL W P-5'-P-CCNC: 74 U/L (ref 0–45)
BILIRUB SERPL-MCNC: 1 MG/DL (ref 0.2–1.3)
BUN SERPL-MCNC: 13 MG/DL (ref 7–30)
CALCIUM SERPL-MCNC: 8.4 MG/DL (ref 8.5–10.1)
CHLORIDE SERPL-SCNC: 111 MMOL/L (ref 94–109)
CO2 SERPL-SCNC: 27 MMOL/L (ref 20–32)
CREAT SERPL-MCNC: 1.21 MG/DL (ref 0.66–1.25)
ERYTHROCYTE [DISTWIDTH] IN BLOOD BY AUTOMATED COUNT: 15.2 % (ref 10–15)
GFR SERPL CREATININE-BSD FRML MDRD: 60 ML/MIN/{1.73_M2}
GLUCOSE SERPL-MCNC: 99 MG/DL (ref 70–99)
HCT VFR BLD AUTO: 39.7 % (ref 40–53)
HGB BLD-MCNC: 13.7 G/DL (ref 13.3–17.7)
LDLC SERPL DIRECT ASSAY-MCNC: 153 MG/DL
MCH RBC QN AUTO: 34.3 PG (ref 26.5–33)
MCHC RBC AUTO-ENTMCNC: 34.5 G/DL (ref 31.5–36.5)
MCV RBC AUTO: 99 FL (ref 78–100)
PLATELET # BLD AUTO: 308 10E9/L (ref 150–450)
POTASSIUM SERPL-SCNC: 3.5 MMOL/L (ref 3.4–5.3)
PROT SERPL-MCNC: 6.7 G/DL (ref 6.8–8.8)
RBC # BLD AUTO: 4 10E12/L (ref 4.4–5.9)
SODIUM SERPL-SCNC: 145 MMOL/L (ref 133–144)
WBC # BLD AUTO: 4.3 10E9/L (ref 4–11)

## 2019-04-01 PROCEDURE — 83721 ASSAY OF BLOOD LIPOPROTEIN: CPT | Performed by: FAMILY MEDICINE

## 2019-04-01 PROCEDURE — 80053 COMPREHEN METABOLIC PANEL: CPT | Performed by: FAMILY MEDICINE

## 2019-04-01 PROCEDURE — 36415 COLL VENOUS BLD VENIPUNCTURE: CPT | Performed by: FAMILY MEDICINE

## 2019-04-01 PROCEDURE — 85027 COMPLETE CBC AUTOMATED: CPT | Performed by: FAMILY MEDICINE

## 2019-04-01 PROCEDURE — 99214 OFFICE O/P EST MOD 30 MIN: CPT | Performed by: FAMILY MEDICINE

## 2019-04-01 RX ORDER — LISINOPRIL 20 MG/1
20 TABLET ORAL DAILY
Qty: 90 TABLET | Refills: 3 | Status: SHIPPED | OUTPATIENT
Start: 2019-04-01 | End: 2020-03-02

## 2019-04-01 RX ORDER — ROSUVASTATIN CALCIUM 10 MG/1
10 TABLET, COATED ORAL DAILY
Qty: 90 TABLET | Refills: 1 | Status: SHIPPED | OUTPATIENT
Start: 2019-04-01 | End: 2019-08-20

## 2019-04-01 ASSESSMENT — MIFFLIN-ST. JEOR: SCORE: 1521.5

## 2019-04-01 NOTE — PROGRESS NOTES
SUBJECTIVE:   Vaibhav Crabtree is a 68 year old male who presents to clinic today for the following health issues:      Hypertension Follow-up      Outpatient blood pressures are not being checked.    Low Salt Diet: low salt      Amount of exercise or physical activity: 4-5 days/week for an average of 30-45 minutes    Problems taking medications regularly: No, but out of medication currently     Medication side effects: none    Diet: regular (no restrictions)      Problem list and histories reviewed & adjusted, as indicated.  Additional history: as documented    Patient Active Problem List   Diagnosis     Dyspnea and respiratory abnormality     Impotence of organic origin     Adjustment reaction     CARDIOVASCULAR SCREENING; LDL GOAL LESS THAN 130     Pain, joint, knee, right     Advanced directives, counseling/discussion     Arthritis of knee, right     Unstable angina (H)     Idiopathic cardiomyopathy (H)     Congestive heart failure (H)     Hypertension     Hyperlipidemia     CAD (coronary artery disease)     Mitral regurgitation     Atypical chest pain     Claudication of right lower extremity (H)     RAYSHAWN (acute kidney injury) (H)     Orthostatic hypotension     Anemia, unspecified     Intestinal malabsorption, unspecified     Iron adverse reaction     Need for prophylactic measure     S/P TKR (total knee replacement) not using cement, right     Past Surgical History:   Procedure Laterality Date     ARTHROPLASTY KNEE Right 5/24/2017    Procedure: ARTHROPLASTY KNEE;  RIGHT TOTAL KNEE ARTHROPLASTY (BIOMET)^ ;  Surgeon: Marco A Iyer MD;  Location:  OR     C NONSPECIFIC PROCEDURE  2002    ulnar nerve     C NONSPECIFIC PROCEDURE      right rotator cuff repair     ESOPHAGOSCOPY, GASTROSCOPY, DUODENOSCOPY (EGD), COMBINED N/A 1/24/2017    Procedure: COMBINED ESOPHAGOSCOPY, GASTROSCOPY, DUODENOSCOPY (EGD), BIOPSY SINGLE OR MULTIPLE;  Surgeon: Reji Mcghee MD;  Location:  GI     ESOPHAGOSCOPY, GASTROSCOPY,  DUODENOSCOPY (EGD), COMBINED N/A 2017    Procedure: COMBINED ESOPHAGOSCOPY, GASTROSCOPY, DUODENOSCOPY (EGD);  gastroscopy;  Surgeon: Renae Mendosa MD;  Location: [facility]     HC REMOVAL OF TONSILS,<9 Y/O      Tonsils <10y.o.     HC REPAIR ROTATOR CUFF,ACUTE  02 and 03    left rotator cuff repair       Social History     Tobacco Use     Smoking status: Former Smoker     Packs/day: 4.00     Years: 15.00     Pack years: 60.00     Types: Cigarettes     Last attempt to quit: 1971     Years since quittin.8     Smokeless tobacco: Never Used   Substance Use Topics     Alcohol use: Yes     Alcohol/week: 0.0 oz     Comment: occ. beer     Family History   Problem Relation Age of Onset     Respiratory Father         COPD     Unknown/Adopted Mother      Cancer Brother      Cancer Brother      Connective Tissue Disorder Paternal Grandfather      Depression Daughter      Eye Disorder Son          Current Outpatient Medications   Medication Sig Dispense Refill     carvedilol (COREG) 25 MG tablet Take 1 tablet (25 mg) by mouth 2 times daily 60 tablet 3     furosemide (LASIX) 20 MG tablet TAKE 1 TABLET BY MOUTH EVERY DAY AS NEEDED 90 tablet 9     lisinopril (PRINIVIL/ZESTRIL) 20 MG tablet Take 1 tablet (20 mg) by mouth daily 90 tablet 3     rivaroxaban ANTICOAGULANT (XARELTO) 20 MG TABS tablet Take 1 tablet (20 mg) by mouth daily (with dinner) 90 tablet 1     ferrous gluconate (FERGON) 324 (38 FE) MG tablet Take 1 tablet (324 mg) by mouth daily (with breakfast) (Patient not taking: Reported on 2019) 60 tablet 0     rivaroxaban ANTICOAGULANT (XARELTO) 15 MG TABS tablet Take 15 mg by mouth 2 times daily (with meals)       tamsulosin (FLOMAX) 0.4 MG capsule TAKE 1 CAPSULE (0.4 MG) BY MOUTH DAILY (Patient not taking: Reported on 2019) 15 capsule 0     Allergies   Allergen Reactions     No Known Allergies      Recent Labs   Lab Test 10/04/17  1422 17  0630 17  0700  17  0322  17  0957   "01/02/17  0956  01/12/15  0525 01/11/15  1240  05/28/14  0605  05/27/14  0255   LDL  --   --   --   --   --   --   --   --   --   --  96  --   --  69  --   --    HDL  --   --   --   --   --   --   --   --   --   --  77  --   --  109  --   --    TRIG  --   --   --   --   --   --   --   --   --   --  130  --   --  130  --   --    ALT  --   --   --   --  31  --  26  --  28   < > 34 43  --   --   --   --    CR 1.22 0.94 1.25   < > 2.06*   < > 1.61*   < > 3.92*   < > 1.13 1.21   < >  --    < > 1.11   GFRESTIMATED 59* 79 57*   < > 32*   < > 43*   < > 15*   < > 65 60*   < >  --    < > 66   GFRESTBLACK 71 >90   GFR Calc   69   < > 39*   < > 52*   < > 19*   < > 78 72   < >  --    < > 80   POTASSIUM 3.7  --  3.7   < > 3.2*   < > 3.9   < > 3.2*   < > 4.3 4.0   < > 3.7   < >  --    TSH  --   --   --   --   --   --   --   --   --   --   --  1.90  --   --   --  6.58*    < > = values in this interval not displayed.      BP Readings from Last 3 Encounters:   04/01/19 (!) 182/104   11/08/17 146/87   10/04/17 (!) 158/98    Wt Readings from Last 3 Encounters:   04/01/19 80.3 kg (177 lb)   11/08/17 79.8 kg (176 lb)   10/04/17 80.7 kg (178 lb)                    Reviewed and updated as needed this visit by clinical staff       Reviewed and updated as needed this visit by Provider         ROS:  Constitutional, HEENT, cardiovascular, pulmonary, gi and gu systems are negative, except as otherwise noted.    OBJECTIVE:     BP (!) 182/104 (Cuff Size: Adult Large)   Pulse 71   Temp 97.3  F (36.3  C) (Tympanic)   Resp 12   Ht 1.702 m (5' 7\")   Wt 80.3 kg (177 lb)   SpO2 97%   BMI 27.72 kg/m    Body mass index is 27.72 kg/m .  GENERAL: healthy, alert and no distress  EYES: Eyes grossly normal to inspection, PERRL and conjunctivae and sclerae normal  HENT: ear canals and TM's normal, nose and mouth without ulcers or lesions  NECK: no adenopathy, no asymmetry, masses, or scars and thyroid normal to palpation  RESP: lungs " clear to auscultation - no rales, rhonchi or wheezes  CV: regular rate and rhythm, normal S1 S2, no S3 or S4, no murmur, click or rub, no peripheral edema and peripheral pulses strong  ABDOMEN: soft, nontender, no hepatosplenomegaly, no masses and bowel sounds normal  MS: no gross musculoskeletal defects noted, no edema  SKIN: no suspicious lesions or rashes  NEURO: Normal strength and tone, mentation intact and speech normal        ASSESSMENT/PLAN:   ASSESSMENT / PLAN:  (I50.9) Congestive heart failure, unspecified HF chronicity, unspecified heart failure type (H)  (primary encounter diagnosis)  Comment: has been stable, off of BP meds for last 4-5 days, today's BP is elevated without clinical sx, hasn't been seen by cardiology after his previous cardiologist retired, encouraged him to set up a new primary cardiology ASAP  Plan: CBC with platelets, Comprehensive metabolic         panel, LDL cholesterol direct            (Z13.6) CARDIOVASCULAR SCREENING; LDL GOAL LESS THAN 130  Plan: CBC with platelets, Comprehensive metabolic         panel, LDL cholesterol direct            (I42.8) Idiopathic cardiomyopathy (H)  Comment: stable   Plan: CBC with platelets, Comprehensive metabolic         panel, LDL cholesterol direct            (I25.10) Coronary artery disease involving native coronary artery of native heart without angina pectoris  Comment: mentioned above   Plan: CBC with platelets, Comprehensive metabolic         panel, LDL cholesterol direct            (I10) Benign essential hypertension  Comment: has been off of med for last 4-5 days and BP got elevated as was mentioned, will resume meds ASAP  Plan: lisinopril (PRINIVIL/ZESTRIL) 20 MG tablet            (I82.4Z9) Deep vein thrombosis (DVT) of distal vein of lower extremity, unspecified chronicity, unspecified laterality (H)  Comment: has been stable clinically, will have him to keep on current dose of medicine   Plan: rivaroxaban ANTICOAGULANT (XARELTO) 20 MG  TABS         tablet            FUTURE APPOINTMENTS:       - Follow-up visit in 6 months     Xander Lee MD  [facility]

## 2019-04-01 NOTE — LETTER
April 2, 2019      Vaibhav Crabtree  [address] [facility] [address]        Dear ,    We are writing to inform you of your test results.      Your lab results including complete blood cell counts/calcium and protein are suppressed, please keep working on balance diet with iron and protein enriched food.   Your LDL cholesterol is elevated significantly, so you should start taking cholesterol lowering medicine. I will send the prescription to pharmacy. Please pick it up and start, and plan to recheck fasting lab in 4 months.  And, your electrolyte balance and glucose/kidney function were all stable at your baseline.    Resulted Orders   CBC with platelets   Result Value Ref Range    WBC 4.3 4.0 - 11.0 10e9/L    RBC Count 4.00 (L) 4.4 - 5.9 10e12/L    Hemoglobin 13.7 13.3 - 17.7 g/dL      Comment:      Results confirmed by repeat test    Hematocrit 39.7 (L) 40.0 - 53.0 %    MCV 99 78 - 100 fl    MCH 34.3 (H) 26.5 - 33.0 pg    MCHC 34.5 31.5 - 36.5 g/dL    RDW 15.2 (H) 10.0 - 15.0 %    Platelet Count 308 150 - 450 10e9/L   Comprehensive metabolic panel   Result Value Ref Range    Sodium 145 (H) 133 - 144 mmol/L    Potassium 3.5 3.4 - 5.3 mmol/L    Chloride 111 (H) 94 - 109 mmol/L    Carbon Dioxide 27 20 - 32 mmol/L    Anion Gap 7 3 - 14 mmol/L    Glucose 99 70 - 99 mg/dL      Comment:      Non Fasting    Urea Nitrogen 13 7 - 30 mg/dL    Creatinine 1.21 0.66 - 1.25 mg/dL    GFR Estimate 60 (L) >60 mL/min/[1.73_m2]      Comment:      Non  GFR Calc  Starting 12/18/2018, serum creatinine based estimated GFR (eGFR) will be   calculated using the Chronic Kidney Disease Epidemiology Collaboration   (CKD-EPI) equation.      GFR Estimate If Black 70 >60 mL/min/[1.73_m2]      Comment:       GFR Calc  Starting 12/18/2018, serum creatinine based estimated GFR (eGFR) will be   calculated using the Chronic Kidney Disease Epidemiology Collaboration   (CKD-EPI) equation.       Calcium 8.4 (L) 8.5 - 10.1 mg/dL    Bilirubin Total 1.0 0.2 - 1.3 mg/dL    Albumin 3.4 3.4 - 5.0 g/dL    Protein Total 6.7 (L) 6.8 - 8.8 g/dL    Alkaline Phosphatase 67 40 - 150 U/L    ALT 59 0 - 70 U/L    AST 74 (H) 0 - 45 U/L   LDL cholesterol direct   Result Value Ref Range    LDL Cholesterol Direct 153 (H) <100 mg/dL      Comment:      Above desirable:  100-129 mg/dl  Borderline High:  130-159 mg/dL  High:             160-189 mg/dL  Very high:       >189 mg/dl         If you have any questions or concerns, please call the clinic at the number listed above.       Sincerely,        Xander Lee MD

## 2019-05-22 DIAGNOSIS — I10 BENIGN ESSENTIAL HYPERTENSION: ICD-10-CM

## 2019-05-23 NOTE — TELEPHONE ENCOUNTER
"Requested Prescriptions   Pending Prescriptions Disp Refills     furosemide (LASIX) 20 MG tablet [Pharmacy Med Name: Furosemide Oral Tablet 20 MG] 90 tablet 8     Sig: TAKE ONE TABLET BY MOUTH DAILY AS NEEDED       Diuretics (Including Combos) Protocol Failed - 5/22/2019  6:25 PM        Failed - Blood pressure under 140/90 in past 12 months     BP Readings from Last 3 Encounters:   04/01/19 (!) 182/104   11/08/17 146/87   10/04/17 (!) 158/98                 Failed - Normal serum sodium on file in past 12 months     Recent Labs   Lab Test 04/01/19  1000   *              Passed - Recent (12 mo) or future (30 days) visit within the authorizing provider's specialty     Patient had office visit in the last 12 months or has a visit in the next 30 days with authorizing provider or within the authorizing provider's specialty.  See \"Patient Info\" tab in inbasket, or \"Choose Columns\" in Meds & Orders section of the refill encounter.              Passed - Medication is active on med list        Passed - Patient is age 18 or older        Passed - Normal serum creatinine on file in past 12 months     Recent Labs   Lab Test 04/01/19  1000   CR 1.21              Passed - Normal serum potassium on file in past 12 months     Recent Labs   Lab Test 04/01/19  1000   POTASSIUM 3.5                    Last Written Prescription Date:  10/16/2017  Last Fill Quantity: 15,  # refills: 0   Last office visit: 4/1/2019 with prescribing provider:  Yes   Future Office Visit:      "

## 2019-05-24 RX ORDER — FUROSEMIDE 20 MG
TABLET ORAL
Qty: 90 TABLET | Refills: 8 | Status: SHIPPED | OUTPATIENT
Start: 2019-05-24 | End: 2020-03-18

## 2019-05-24 NOTE — TELEPHONE ENCOUNTER
Routing refill request to provider for review/approval because:  Labs out of range:  BP, Na    KIMI OrtegaN, RN  [employer] [facility]

## 2019-08-20 DIAGNOSIS — E78.2 MIXED HYPERLIPIDEMIA: ICD-10-CM

## 2019-08-20 NOTE — TELEPHONE ENCOUNTER
"Last Written Prescription Date:  4/1/19  Last Fill Quantity: 90 tablets,  # refills: 1   Last office visit: 4/1/2019 with prescribing provider:  Jesus   Future Office Visit:      Requested Prescriptions   Pending Prescriptions Disp Refills     rosuvastatin (CRESTOR) 10 MG tablet [Pharmacy Med Name: Rosuvastatin Calcium Oral Tablet 10 MG] 90 tablet 0     Sig: TAKE 1 TABLET BY MOUTH ONE TIME DAILY       Statins Protocol Passed - 8/20/2019  6:10 PM        Passed - LDL on file in past 12 months     Recent Labs   Lab Test 04/01/19  1000   *             Passed - No abnormal creatine kinase in past 12 months     No lab results found.             Passed - Recent (12 mo) or future (30 days) visit within the authorizing provider's specialty     Patient had office visit in the last 12 months or has a visit in the next 30 days with authorizing provider or within the authorizing provider's specialty.  See \"Patient Info\" tab in inbasket, or \"Choose Columns\" in Meds & Orders section of the refill encounter.              Passed - Medication is active on med list        Passed - Patient is age 18 or older          "

## 2019-08-21 RX ORDER — ROSUVASTATIN CALCIUM 10 MG/1
TABLET, COATED ORAL
Qty: 30 TABLET | Refills: 0 | Status: SHIPPED | OUTPATIENT
Start: 2019-08-21 | End: 2019-10-28

## 2019-08-21 NOTE — TELEPHONE ENCOUNTER
Routing refill request to provider for review/approval because:  Labs out of range:  No ldl goal listed  Please route to team to schedule follow up    Notes recorded by Xander Lee MD on 4/1/2019 at 4:08 PM CDT  Please send letter with results  thx    Dear Bill,     Your lab results including complete blood cell counts/calcium and protein are suppressed, please keep working on balance diet with iron and protein enriched food.   Your LDL cholesterol is elevated significantly, so you should start taking cholesterol lowering medicine. I will send the prescription to pharmacy. Please pick it up and start, and plan to recheck fasting lab in 4 months.  And, your electrolyte balance and glucose/kidney function were all stable at your baseline.

## 2019-10-28 DIAGNOSIS — E78.2 MIXED HYPERLIPIDEMIA: ICD-10-CM

## 2019-10-28 RX ORDER — ROSUVASTATIN CALCIUM 10 MG/1
TABLET, COATED ORAL
Qty: 30 TABLET | Refills: 0 | Status: SHIPPED | OUTPATIENT
Start: 2019-10-28 | End: 2019-12-10

## 2019-10-28 NOTE — TELEPHONE ENCOUNTER
"Requested Prescriptions   Pending Prescriptions Disp Refills     rosuvastatin (CRESTOR) 10 MG tablet [Pharmacy Med Name: Rosuvastatin Calcium Oral Tablet 10 MG] 30 tablet 0     Sig: TAKE ONE TABLET BY MOUTH ONE TIME DAILY       Statins Protocol Passed - 10/28/2019 11:07 AM        Passed - LDL on file in past 12 months     Recent Labs   Lab Test 04/01/19  1000   *             Passed - No abnormal creatine kinase in past 12 months     No lab results found.             Passed - Recent (12 mo) or future (30 days) visit within the authorizing provider's specialty     Patient has had an office visit with the authorizing provider or a provider within the authorizing providers department within the previous 12 mos or has a future within next 30 days. See \"Patient Info\" tab in inbasket, or \"Choose Columns\" in Meds & Orders section of the refill encounter.              Passed - Medication is active on med list        Passed - Patient is age 18 or older        Routing refill request to provider for review/approval because:  Labs out of range:  LDL above goal    Cele Villarreal RN   [facility] - Triage           "

## 2019-12-10 DIAGNOSIS — I10 BENIGN ESSENTIAL HYPERTENSION: ICD-10-CM

## 2019-12-10 DIAGNOSIS — E78.2 MIXED HYPERLIPIDEMIA: ICD-10-CM

## 2019-12-11 RX ORDER — ROSUVASTATIN CALCIUM 10 MG/1
TABLET, COATED ORAL
Qty: 30 TABLET | Refills: 0 | Status: SHIPPED | OUTPATIENT
Start: 2019-12-11 | End: 2020-01-31

## 2019-12-11 RX ORDER — LISINOPRIL 20 MG/1
TABLET ORAL
Qty: 90 TABLET | Refills: 2 | OUTPATIENT
Start: 2019-12-11

## 2019-12-11 NOTE — TELEPHONE ENCOUNTER
"Requested Prescriptions   Pending Prescriptions Disp Refills     rosuvastatin (CRESTOR) 10 MG tablet [Pharmacy Med Name: Rosuvastatin Calcium Oral Tablet 10 MG] 30 tablet 0     Sig: TAKE ONE TABLET BY MOUTH ONE TIME DAILY       Statins Protocol Passed - 12/10/2019  2:25 PM        Passed - LDL on file in past 12 months     Recent Labs   Lab Test 04/01/19  1000   *             Passed - No abnormal creatine kinase in past 12 months     No lab results found.             Passed - Recent (12 mo) or future (30 days) visit within the authorizing provider's specialty     Patient has had an office visit with the authorizing provider or a provider within the authorizing providers department within the previous 12 mos or has a future within next 30 days. See \"Patient Info\" tab in inbasket, or \"Choose Columns\" in Meds & Orders section of the refill encounter.              Passed - Medication is active on med list        Passed - Patient is age 18 or older   Last Written Prescription Date:  10/28/19  Last Fill Quantity: 30,  # refills: 0   Last office visit: 4/1/2019 with prescribing provider:  Dr. Lee   Future Office Visit:    Routing refill request to provider for review/approval because:  Kelsey given x1 and patient did not follow up, please advise.  Patient is overdue for follow up lab.  Germania Adams RN             lisinopril (PRINIVIL/ZESTRIL) 20 MG tablet [Pharmacy Med Name: Lisinopril Oral Tablet 20 MG] 90 tablet 2     Sig: TAKE ONE TABLET BY MOUTH ONE TIME DAILY       ACE Inhibitors (Including Combos) Protocol Failed - 12/10/2019  2:25 PM        Failed - Blood pressure under 140/90 in past 12 months     BP Readings from Last 3 Encounters:   04/01/19 (!) 182/104   11/08/17 146/87   10/04/17 (!) 158/98                 Passed - Recent (12 mo) or future (30 days) visit within the authorizing provider's specialty     Patient has had an office visit with the authorizing provider or a provider within the authorizing " "providers department within the previous 12 mos or has a future within next 30 days. See \"Patient Info\" tab in inbasket, or \"Choose Columns\" in Meds & Orders section of the refill encounter.              Passed - Medication is active on med list        Passed - Patient is age 18 or older        Passed - Normal serum creatinine on file in past 12 months     Recent Labs   Lab Test 04/01/19  1000   CR 1.21             Passed - Normal serum potassium on file in past 12 months     Recent Labs   Lab Test 04/01/19  1000   POTASSIUM 3.5             Last Written Prescription Date:  4/1/19  Last Fill Quantity: 90,  # refills: 3   Last office visit: 4/1/2019 with prescribing provider:  Dr. Lee   Future Office Visit:    Refused lisinopril. Refill remaining on current rx to same pharmacy.  Germania Adams RN    "

## 2020-01-20 DIAGNOSIS — E78.2 MIXED HYPERLIPIDEMIA: ICD-10-CM

## 2020-01-21 NOTE — TELEPHONE ENCOUNTER
Routing refill request to provider for review/approval because:  Kelsey given x1 and patient did not follow up, please advise  Labs out of range:  LDL.     Lulu Obando RN, BSN  [facility] [place]

## 2020-01-21 NOTE — TELEPHONE ENCOUNTER
"Requested Prescriptions   Pending Prescriptions Disp Refills     rosuvastatin (CRESTOR) 10 MG tablet [Pharmacy Med Name: Rosuvastatin Calcium Oral Tablet 10 MG] 30 tablet 0     Sig: TAKE ONE TABLET BY MOUTH ONE TIME DAILY   Last Written Prescription Date:  12/11/2019  Last Fill Quantity: 30 tablet,  # refills: 0   Last office visit: 4/1/2019 with prescribing provider:  JULIO Lee    Future Office Visit:        Statins Protocol Passed - 1/20/2020  6:52 PM        Passed - LDL on file in past 12 months     Recent Labs   Lab Test 04/01/19  1000   *             Passed - No abnormal creatine kinase in past 12 months     No lab results found.             Passed - Recent (12 mo) or future (30 days) visit within the authorizing provider's specialty     Patient has had an office visit with the authorizing provider or a provider within the authorizing providers department within the previous 12 mos or has a future within next 30 days. See \"Patient Info\" tab in inbasket, or \"Choose Columns\" in Meds & Orders section of the refill encounter.              Passed - Medication is active on med list        Passed - Patient is age 18 or older          "

## 2020-01-31 RX ORDER — ROSUVASTATIN CALCIUM 10 MG/1
TABLET, COATED ORAL
Qty: 15 TABLET | Refills: 0 | Status: SHIPPED | OUTPATIENT
Start: 2020-01-31 | End: 2020-03-18

## 2020-02-28 DIAGNOSIS — I10 ESSENTIAL HYPERTENSION: ICD-10-CM

## 2020-02-28 DIAGNOSIS — I10 BENIGN ESSENTIAL HYPERTENSION: ICD-10-CM

## 2020-03-02 RX ORDER — CARVEDILOL 25 MG/1
TABLET ORAL
Qty: 60 TABLET | Refills: 0 | Status: SHIPPED | OUTPATIENT
Start: 2020-03-02 | End: 2020-03-18

## 2020-03-02 RX ORDER — LISINOPRIL 20 MG/1
TABLET ORAL
Qty: 30 TABLET | Refills: 0 | Status: SHIPPED | OUTPATIENT
Start: 2020-03-02 | End: 2020-03-18

## 2020-03-02 NOTE — TELEPHONE ENCOUNTER
Routing to team to inform and assist with scheduling. Thank you very much.     Lulu Obando RN, BSN  [facility] [place]

## 2020-03-02 NOTE — TELEPHONE ENCOUNTER
"  Failed protocol      Requested Prescriptions   Pending Prescriptions Disp Refills     carvedilol (COREG) 25 MG tablet [Pharmacy Med Name: Carvedilol Oral Tablet 25 MG] 60 tablet 2     Sig: TAKE ONE TABLET BY MOUTH TWICE DAILY       Beta-Blockers Protocol Failed - 2/28/2020  6:23 PM        Failed - Blood pressure under 140/90 in past 12 months     BP Readings from Last 3 Encounters:   04/01/19 (!) 182/104   11/08/17 146/87   10/04/17 (!) 158/98                 Passed - Patient is age 6 or older        Passed - Recent (12 mo) or future (30 days) visit within the authorizing provider's specialty     Patient has had an office visit with the authorizing provider or a provider within the authorizing providers department within the previous 12 mos or has a future within next 30 days. See \"Patient Info\" tab in inbasket, or \"Choose Columns\" in Meds & Orders section of the refill encounter.              Passed - Medication is active on med list        lisinopril (ZESTRIL) 20 MG tablet [Pharmacy Med Name: Lisinopril Oral Tablet 20 MG] 90 tablet 2     Sig: TAKE ONE TABLET BY MOUTH ONE TIME DAILY       ACE Inhibitors (Including Combos) Protocol Failed - 2/28/2020  6:23 PM        Failed - Blood pressure under 140/90 in past 12 months     BP Readings from Last 3 Encounters:   04/01/19 (!) 182/104   11/08/17 146/87   10/04/17 (!) 158/98                 Passed - Recent (12 mo) or future (30 days) visit within the authorizing provider's specialty     Patient has had an office visit with the authorizing provider or a provider within the authorizing providers department within the previous 12 mos or has a future within next 30 days. See \"Patient Info\" tab in inbasket, or \"Choose Columns\" in Meds & Orders section of the refill encounter.              Passed - Medication is active on med list        Passed - Patient is age 18 or older        Passed - Normal serum creatinine on file in past 12 months     Recent Labs   Lab Test " 04/01/19  1000   CR 1.21             Passed - Normal serum potassium on file in past 12 months     Recent Labs   Lab Test 04/01/19  1000   POTASSIUM 3.5

## 2020-03-09 ENCOUNTER — TELEPHONE (OUTPATIENT)
Dept: FAMILY MEDICINE | Facility: CLINIC | Age: 70
End: 2020-03-09

## 2020-03-09 NOTE — TELEPHONE ENCOUNTER
Pt calling states he has an appt with Dr. Lee on 3/18 and vomits every time he eats something.  He has been taking omeprazole 20 mg daily which is not helping.  Does not matter if pt stays upright or lies down 30-60 minutes after eating his food will come back up.  Denies gas or bloating.  States had a bunch of testing done years ago and does not want to go through that again.  Just wants to know why he cannot keep his food down.    Miranda PEREZ RN  EP Triage

## 2020-03-09 NOTE — TELEPHONE ENCOUNTER
Reason for call:  Patient reporting a symptom    Symptom or request: Pt throw up every time he eat.    Duration (how long have symptoms been present): for a year    Have you been treated for this before? Yes    Additional comments: Pt need to speak with a nurse to see if there are any medication he can take to keep his food in stomach. Pt stated he has Medicare and cost him a lot of money to come ans see Dr all the time.    Phone Number patient can be reached at:  Home number on file [phone] (home)    Best Time:  anytime    Can we leave a detailed message on this number:  YES    Call taken on 3/9/2020 at 4:02 PM by Rosemarie Bishop

## 2020-03-18 ENCOUNTER — OFFICE VISIT (OUTPATIENT)
Dept: FAMILY MEDICINE | Facility: CLINIC | Age: 70
End: 2020-03-18

## 2020-03-18 ENCOUNTER — TELEPHONE (OUTPATIENT)
Dept: FAMILY MEDICINE | Facility: CLINIC | Age: 70
End: 2020-03-18

## 2020-03-18 VITALS
OXYGEN SATURATION: 96 % | TEMPERATURE: 95.2 F | DIASTOLIC BLOOD PRESSURE: 82 MMHG | SYSTOLIC BLOOD PRESSURE: 114 MMHG | HEIGHT: 67 IN | BODY MASS INDEX: 27.91 KG/M2 | HEART RATE: 69 BPM | WEIGHT: 177.8 LBS

## 2020-03-18 DIAGNOSIS — I82.4Z1 ACUTE VENOUS EMBOLISM AND THROMBOSIS OF DEEP VESSELS OF DISTAL LOWER EXTREMITY, RIGHT (H): ICD-10-CM

## 2020-03-18 DIAGNOSIS — I82.4Z9 DEEP VEIN THROMBOSIS (DVT) OF DISTAL VEIN OF LOWER EXTREMITY, UNSPECIFIED CHRONICITY, UNSPECIFIED LATERALITY (H): ICD-10-CM

## 2020-03-18 DIAGNOSIS — I10 ESSENTIAL HYPERTENSION: ICD-10-CM

## 2020-03-18 DIAGNOSIS — E78.2 MIXED HYPERLIPIDEMIA: ICD-10-CM

## 2020-03-18 DIAGNOSIS — M54.9 ACUTE BILATERAL BACK PAIN, UNSPECIFIED BACK LOCATION: ICD-10-CM

## 2020-03-18 DIAGNOSIS — R30.0 DYSURIA: ICD-10-CM

## 2020-03-18 DIAGNOSIS — I10 BENIGN ESSENTIAL HYPERTENSION: ICD-10-CM

## 2020-03-18 DIAGNOSIS — R10.9 FLANK PAIN: ICD-10-CM

## 2020-03-18 DIAGNOSIS — I82.4Z9 DEEP VEIN THROMBOSIS (DVT) OF DISTAL VEIN OF LOWER EXTREMITY, UNSPECIFIED CHRONICITY, UNSPECIFIED LATERALITY (H): Primary | ICD-10-CM

## 2020-03-18 DIAGNOSIS — Z96.651 S/P TKR (TOTAL KNEE REPLACEMENT) NOT USING CEMENT, RIGHT: ICD-10-CM

## 2020-03-18 DIAGNOSIS — K22.10 ULCER OF ESOPHAGUS WITHOUT BLEEDING: ICD-10-CM

## 2020-03-18 LAB
ERYTHROCYTE [DISTWIDTH] IN BLOOD BY AUTOMATED COUNT: 16.6 % (ref 10–15)
HCT VFR BLD AUTO: 44.3 % (ref 40–53)
HGB BLD-MCNC: 14.1 G/DL (ref 13.3–17.7)
MCH RBC QN AUTO: 29.4 PG (ref 26.5–33)
MCHC RBC AUTO-ENTMCNC: 31.8 G/DL (ref 31.5–36.5)
MCV RBC AUTO: 93 FL (ref 78–100)
PLATELET # BLD AUTO: 262 10E9/L (ref 150–450)
RBC # BLD AUTO: 4.79 10E12/L (ref 4.4–5.9)
WBC # BLD AUTO: 4.6 10E9/L (ref 4–11)

## 2020-03-18 PROCEDURE — 99214 OFFICE O/P EST MOD 30 MIN: CPT | Performed by: FAMILY MEDICINE

## 2020-03-18 PROCEDURE — 36415 COLL VENOUS BLD VENIPUNCTURE: CPT | Performed by: FAMILY MEDICINE

## 2020-03-18 PROCEDURE — 80053 COMPREHEN METABOLIC PANEL: CPT | Performed by: FAMILY MEDICINE

## 2020-03-18 PROCEDURE — 85027 COMPLETE CBC AUTOMATED: CPT | Performed by: FAMILY MEDICINE

## 2020-03-18 PROCEDURE — 80061 LIPID PANEL: CPT | Performed by: FAMILY MEDICINE

## 2020-03-18 RX ORDER — LISINOPRIL 20 MG/1
20 TABLET ORAL DAILY
Qty: 90 TABLET | Refills: 1 | Status: SHIPPED | OUTPATIENT
Start: 2020-03-18 | End: 2020-10-19

## 2020-03-18 RX ORDER — FUROSEMIDE 20 MG
TABLET ORAL
Qty: 90 TABLET | Refills: 1 | Status: SHIPPED | OUTPATIENT
Start: 2020-03-18 | End: 2021-02-03

## 2020-03-18 RX ORDER — CARVEDILOL 25 MG/1
25 TABLET ORAL 2 TIMES DAILY
Qty: 180 TABLET | Refills: 1 | Status: SHIPPED | OUTPATIENT
Start: 2020-03-18 | End: 2020-10-19

## 2020-03-18 RX ORDER — TAMSULOSIN HYDROCHLORIDE 0.4 MG/1
CAPSULE ORAL
Qty: 90 CAPSULE | Refills: 1 | Status: SHIPPED | OUTPATIENT
Start: 2020-03-18 | End: 2020-09-01

## 2020-03-18 RX ORDER — ROSUVASTATIN CALCIUM 10 MG/1
10 TABLET, COATED ORAL DAILY
Qty: 90 TABLET | Refills: 1 | Status: SHIPPED | OUTPATIENT
Start: 2020-03-18 | End: 2020-09-09 | Stop reason: DRUGHIGH

## 2020-03-18 RX ORDER — FERROUS GLUCONATE 324(38)MG
324 TABLET ORAL
Qty: 90 TABLET | Refills: 1 | Status: SHIPPED | OUTPATIENT
Start: 2020-03-18 | End: 2023-05-19

## 2020-03-18 ASSESSMENT — MIFFLIN-ST. JEOR: SCORE: 1520.13

## 2020-03-18 NOTE — TELEPHONE ENCOUNTER
Left a detailed message with MD response below. Informed patient that medication was sent to [facility] in EP.     Lulu Obando RN, BSN  [facility]

## 2020-03-18 NOTE — TELEPHONE ENCOUNTER
Reason for Call:  Other call back    Detailed comments: pt called regarding one of the Rx sent out today. rivaroxaban ANTICOAGULANT (XARELTO) 20 MG TABS tablet     Pt stated that the med cost $1500.00 and he can not afford that, so pt is wondering if there is any med cost cheaper.    pls call and advice pt      Phone Number Patient can be reached at: Cell number on file:    Telephone Information:   Mobile [phone]       Best Time: anytime     Can we leave a detailed message on this number? YES    Call taken on 3/18/2020 at 2:53 PM by SONU HYMAN

## 2020-03-18 NOTE — PROGRESS NOTES
Subjective     Vaibhav Crabtree is a 69 year old male who presents to clinic today for the following health issues:    HPI   Medication refill: pt state that he would like all of his medication refilled. All of the medication has been showing improvement stated pt. Medication has no side effects         Patient Active Problem List   Diagnosis     Dyspnea and respiratory abnormality     Impotence of organic origin     Adjustment reaction     CARDIOVASCULAR SCREENING; LDL GOAL LESS THAN 130     Pain, joint, knee, right     Advanced directives, counseling/discussion     Arthritis of knee, right     Unstable angina (H)     Idiopathic cardiomyopathy (H)     Congestive heart failure (H)     Hypertension     Hyperlipidemia     CAD (coronary artery disease)     Mitral regurgitation     Atypical chest pain     Claudication of right lower extremity (H)     RAYSHAWN (acute kidney injury) (H)     Orthostatic hypotension     Anemia, unspecified     Intestinal malabsorption, unspecified     Iron adverse reaction     Need for prophylactic measure     S/P TKR (total knee replacement) not using cement, right     Past Surgical History:   Procedure Laterality Date     ARTHROPLASTY KNEE Right 5/24/2017    Procedure: ARTHROPLASTY KNEE;  RIGHT TOTAL KNEE ARTHROPLASTY (BIOMET)^ ;  Surgeon: Marco A Iyer MD;  Location:  OR     C NONSPECIFIC PROCEDURE  2002    ulnar nerve     C NONSPECIFIC PROCEDURE      right rotator cuff repair     ESOPHAGOSCOPY, GASTROSCOPY, DUODENOSCOPY (EGD), COMBINED N/A 1/24/2017    Procedure: COMBINED ESOPHAGOSCOPY, GASTROSCOPY, DUODENOSCOPY (EGD), BIOPSY SINGLE OR MULTIPLE;  Surgeon: Reji Mcghee MD;  Location:  GI     ESOPHAGOSCOPY, GASTROSCOPY, DUODENOSCOPY (EGD), COMBINED N/A 5/26/2017    Procedure: COMBINED ESOPHAGOSCOPY, GASTROSCOPY, DUODENOSCOPY (EGD);  gastroscopy;  Surgeon: Renae Mendosa MD;  Location:  GI     HC REMOVAL OF TONSILS,<11 Y/O      Tonsils <10y.o.     HC REPAIR ROTATOR  CUFF,ACUTE  02 and 03    left rotator cuff repair       Social History     Tobacco Use     Smoking status: Former Smoker     Packs/day: 4.00     Years: 15.00     Pack years: 60.00     Types: Cigarettes     Last attempt to quit: 1971     Years since quittin.7     Smokeless tobacco: Never Used   Substance Use Topics     Alcohol use: Yes     Alcohol/week: 0.0 standard drinks     Comment: occ. beer     Family History   Problem Relation Age of Onset     Respiratory Father         COPD     Unknown/Adopted Mother      Cancer Brother      Cancer Brother      Connective Tissue Disorder Paternal Grandfather      Depression Daughter      Eye Disorder Son          Current Outpatient Medications   Medication Sig Dispense Refill     carvedilol (COREG) 25 MG tablet Take 1 tablet (25 mg) by mouth 2 times daily 180 tablet 1     ferrous gluconate (FERGON) 324 (38 Fe) MG tablet Take 1 tablet (324 mg) by mouth daily (with breakfast) 90 tablet 1     furosemide (LASIX) 20 MG tablet TAKE ONE TABLET BY MOUTH DAILY AS NEEDED 90 tablet 1     lisinopril (ZESTRIL) 20 MG tablet Take 1 tablet (20 mg) by mouth daily 90 tablet 1     rivaroxaban ANTICOAGULANT (XARELTO) 15 MG TABS tablet Take 15 mg by mouth 2 times daily (with meals)       rivaroxaban ANTICOAGULANT (XARELTO) 20 MG TABS tablet Take 1 tablet (20 mg) by mouth daily (with dinner) 90 tablet 1     rosuvastatin (CRESTOR) 10 MG tablet Take 1 tablet (10 mg) by mouth daily 90 tablet 1     tamsulosin (FLOMAX) 0.4 MG capsule TAKE 1 CAPSULE (0.4 MG) BY MOUTH DAILY 90 capsule 1     Allergies   Allergen Reactions     No Known Allergies      Recent Labs   Lab Test 19  1000 10/04/17  1422  17  0322  17  0957  01/12/15  0525 01/11/15  1240  14  0605  14  0255   *  --   --   --   --   --   --  96  --   --  69  --   --    HDL  --   --   --   --   --   --   --  77  --   --  109  --   --    TRIG  --   --   --   --   --   --   --  130  --   --  130  --   --   "  ALT 59  --   --  31  --  26   < > 34 43  --   --   --   --    CR 1.21 1.22   < > 2.06*   < > 1.61*   < > 1.13 1.21   < >  --    < > 1.11   GFRESTIMATED 60* 59*   < > 32*   < > 43*   < > 65 60*   < >  --    < > 66   GFRESTBLACK 70 71   < > 39*   < > 52*   < > 78 72   < >  --    < > 80   POTASSIUM 3.5 3.7   < > 3.2*   < > 3.9   < > 4.3 4.0   < > 3.7   < >  --    TSH  --   --   --   --   --   --   --   --  1.90  --   --   --  6.58*    < > = values in this interval not displayed.      BP Readings from Last 3 Encounters:   03/18/20 114/82   04/01/19 (!) 182/104   11/08/17 146/87    Wt Readings from Last 3 Encounters:   03/18/20 80.6 kg (177 lb 12.8 oz)   04/01/19 80.3 kg (177 lb)   11/08/17 79.8 kg (176 lb)           Reviewed and updated as needed this visit by Provider         Review of Systems   ROS COMP: Constitutional, HEENT, cardiovascular, pulmonary, gi and gu systems are negative, except as otherwise noted.      Objective    /82   Pulse 69   Temp 95.2  F (35.1  C)   Ht 1.702 m (5' 7\")   Wt 80.6 kg (177 lb 12.8 oz)   SpO2 96%   BMI 27.85 kg/m    Body mass index is 27.85 kg/m .  Physical Exam   GENERAL: healthy, alert and no distress  EYES: Eyes grossly normal to inspection, PERRL and conjunctivae and sclerae normal  HENT: ear canals and TM's normal, nose and mouth without ulcers or lesions  NECK: no adenopathy, no asymmetry, masses, or scars and thyroid normal to palpation  RESP: lungs clear to auscultation - no rales, rhonchi or wheezes  CV: regular rate and rhythm, normal S1 S2, no S3 or S4, no murmur, click or rub, no peripheral edema and peripheral pulses strong  ABDOMEN: soft, nontender, no hepatosplenomegaly, no masses and bowel sounds normal  MS: no gross musculoskeletal defects noted, no edema  SKIN: no suspicious lesions or rashes  NEURO: Normal strength and tone, mentation intact and speech normal            Assessment & Plan     1. Essential hypertension  Has been stable with current dose " of medicine   Will keep monitoring   - carvedilol (COREG) 25 MG tablet; Take 1 tablet (25 mg) by mouth 2 times daily  Dispense: 180 tablet; Refill: 1  - CBC with platelets  - Comprehensive metabolic panel  - Lipid panel reflex to direct LDL Fasting    2. Acute venous embolism and thrombosis of deep vessels of distal lower extremity, right (H)  Has been off of xarelto without clinical sx, will resume  - ferrous gluconate (FERGON) 324 (38 Fe) MG tablet; Take 1 tablet (324 mg) by mouth daily (with breakfast)  Dispense: 90 tablet; Refill: 1  - CBC with platelets  - Comprehensive metabolic panel  - Lipid panel reflex to direct LDL Fasting    3. Ulcer of esophagus without bleeding  Stable, keep monitoring   - ferrous gluconate (FERGON) 324 (38 Fe) MG tablet; Take 1 tablet (324 mg) by mouth daily (with breakfast)  Dispense: 90 tablet; Refill: 1  - CBC with platelets  - Comprehensive metabolic panel  - Lipid panel reflex to direct LDL Fasting    4. S/P TKR (total knee replacement) not using cement, right  Mentioned above   - ferrous gluconate (FERGON) 324 (38 Fe) MG tablet; Take 1 tablet (324 mg) by mouth daily (with breakfast)  Dispense: 90 tablet; Refill: 1  - CBC with platelets  - Comprehensive metabolic panel  - Lipid panel reflex to direct LDL Fasting    5. Benign essential hypertension  Stable   - furosemide (LASIX) 20 MG tablet; TAKE ONE TABLET BY MOUTH DAILY AS NEEDED  Dispense: 90 tablet; Refill: 1  - lisinopril (ZESTRIL) 20 MG tablet; Take 1 tablet (20 mg) by mouth daily  Dispense: 90 tablet; Refill: 1  - CBC with platelets  - Comprehensive metabolic panel  - Lipid panel reflex to direct LDL Fasting    6. Deep vein thrombosis (DVT) of distal vein of lower extremity, unspecified chronicity, unspecified laterality (H)  Mentioned above  - rivaroxaban ANTICOAGULANT (XARELTO) 20 MG TABS tablet; Take 1 tablet (20 mg) by mouth daily (with dinner)  Dispense: 90 tablet; Refill: 1  - CBC with platelets  - Comprehensive  "metabolic panel  - Lipid panel reflex to direct LDL Fasting    7. Mixed hyperlipidemia    - rosuvastatin (CRESTOR) 10 MG tablet; Take 1 tablet (10 mg) by mouth daily  Dispense: 90 tablet; Refill: 1  - CBC with platelets  - Comprehensive metabolic panel  - Lipid panel reflex to direct LDL Fasting    8. Flank pain    - tamsulosin (FLOMAX) 0.4 MG capsule; TAKE 1 CAPSULE (0.4 MG) BY MOUTH DAILY  Dispense: 90 capsule; Refill: 1  - CBC with platelets  - Comprehensive metabolic panel  - Lipid panel reflex to direct LDL Fasting    9. Acute bilateral back pain, unspecified back location    - tamsulosin (FLOMAX) 0.4 MG capsule; TAKE 1 CAPSULE (0.4 MG) BY MOUTH DAILY  Dispense: 90 capsule; Refill: 1  - CBC with platelets  - Comprehensive metabolic panel  - Lipid panel reflex to direct LDL Fasting    10. Dysuria    - tamsulosin (FLOMAX) 0.4 MG capsule; TAKE 1 CAPSULE (0.4 MG) BY MOUTH DAILY  Dispense: 90 capsule; Refill: 1  - CBC with platelets  - Comprehensive metabolic panel  - Lipid panel reflex to direct LDL Fasting     BMI:   Estimated body mass index is 27.85 kg/m  as calculated from the following:    Height as of this encounter: 1.702 m (5' 7\").    Weight as of this encounter: 80.6 kg (177 lb 12.8 oz).           FUTURE APPOINTMENTS:       - Follow-up visit in 6months for CPE and med check     No follow-ups on file.    Xander Lee MD  [facility] [place]      "

## 2020-03-19 LAB
ALBUMIN SERPL-MCNC: 3.6 G/DL (ref 3.4–5)
ALP SERPL-CCNC: 55 U/L (ref 40–150)
ALT SERPL W P-5'-P-CCNC: 41 U/L (ref 0–70)
ANION GAP SERPL CALCULATED.3IONS-SCNC: 6 MMOL/L (ref 3–14)
AST SERPL W P-5'-P-CCNC: 42 U/L (ref 0–45)
BILIRUB SERPL-MCNC: 0.5 MG/DL (ref 0.2–1.3)
BUN SERPL-MCNC: 36 MG/DL (ref 7–30)
CALCIUM SERPL-MCNC: 8.9 MG/DL (ref 8.5–10.1)
CHLORIDE SERPL-SCNC: 107 MMOL/L (ref 94–109)
CHOLEST SERPL-MCNC: 303 MG/DL
CO2 SERPL-SCNC: 28 MMOL/L (ref 20–32)
CREAT SERPL-MCNC: 2.36 MG/DL (ref 0.66–1.25)
GFR SERPL CREATININE-BSD FRML MDRD: 27 ML/MIN/{1.73_M2}
GLUCOSE SERPL-MCNC: 114 MG/DL (ref 70–99)
HDLC SERPL-MCNC: 47 MG/DL
LDLC SERPL CALC-MCNC: 227 MG/DL
NONHDLC SERPL-MCNC: 256 MG/DL
POTASSIUM SERPL-SCNC: 4.7 MMOL/L (ref 3.4–5.3)
PROT SERPL-MCNC: 7.8 G/DL (ref 6.8–8.8)
SODIUM SERPL-SCNC: 141 MMOL/L (ref 133–144)
TRIGL SERPL-MCNC: 147 MG/DL

## 2020-03-19 RX ORDER — ROSUVASTATIN CALCIUM 20 MG/1
20 TABLET, COATED ORAL DAILY
Qty: 90 TABLET | Refills: 3 | Status: SHIPPED | OUTPATIENT
Start: 2020-03-19 | End: 2020-10-19

## 2020-09-01 DIAGNOSIS — M54.9 ACUTE BILATERAL BACK PAIN, UNSPECIFIED BACK LOCATION: ICD-10-CM

## 2020-09-01 DIAGNOSIS — R10.9 FLANK PAIN: ICD-10-CM

## 2020-09-01 DIAGNOSIS — E78.2 MIXED HYPERLIPIDEMIA: ICD-10-CM

## 2020-09-01 DIAGNOSIS — R30.0 DYSURIA: ICD-10-CM

## 2020-09-01 RX ORDER — TAMSULOSIN HYDROCHLORIDE 0.4 MG/1
CAPSULE ORAL
Qty: 90 CAPSULE | Refills: 1 | Status: SHIPPED | OUTPATIENT
Start: 2020-09-01 | End: 2021-02-15

## 2020-09-01 RX ORDER — ROSUVASTATIN CALCIUM 10 MG/1
TABLET, COATED ORAL
Qty: 90 TABLET | Refills: 0 | OUTPATIENT
Start: 2020-09-01

## 2020-10-02 ENCOUNTER — NURSE TRIAGE (OUTPATIENT)
Dept: NURSING | Facility: CLINIC | Age: 70
End: 2020-10-02

## 2020-10-03 NOTE — TELEPHONE ENCOUNTER
Called to reschedule an appointment that he missed on 9/25.  Caller states that he had a death in the family and needed to travel out of state.  Call was transferred to a  to set this up for the patient.  Larisa Swenson RN    Reason for Disposition    Requesting regular office appointment    Additional Information    Negative: [1] Caller is not with the adult (patient) AND [2] reporting urgent symptoms    Negative: Lab result questions    Negative: Medication questions    Negative: Caller can't be reached by phone    Negative: Caller has already spoken to PCP or another triager    Negative: RN needs further essential information from caller in order to complete triage    Protocols used: INFORMATION ONLY CALL-A-

## 2020-10-19 ENCOUNTER — OFFICE VISIT (OUTPATIENT)
Dept: FAMILY MEDICINE | Facility: CLINIC | Age: 70
End: 2020-10-19
Payer: MEDICARE

## 2020-10-19 VITALS
SYSTOLIC BLOOD PRESSURE: 80 MMHG | TEMPERATURE: 97.6 F | OXYGEN SATURATION: 96 % | HEART RATE: 74 BPM | HEIGHT: 67 IN | WEIGHT: 165 LBS | BODY MASS INDEX: 25.9 KG/M2 | DIASTOLIC BLOOD PRESSURE: 47 MMHG

## 2020-10-19 DIAGNOSIS — N18.4 CKD (CHRONIC KIDNEY DISEASE) STAGE 4, GFR 15-29 ML/MIN (H): ICD-10-CM

## 2020-10-19 DIAGNOSIS — I42.9 IDIOPATHIC CARDIOMYOPATHY (H): ICD-10-CM

## 2020-10-19 DIAGNOSIS — I10 BENIGN ESSENTIAL HYPERTENSION: ICD-10-CM

## 2020-10-19 DIAGNOSIS — E78.2 MIXED HYPERLIPIDEMIA: Primary | ICD-10-CM

## 2020-10-19 DIAGNOSIS — I50.9 CONGESTIVE HEART FAILURE, UNSPECIFIED HF CHRONICITY, UNSPECIFIED HEART FAILURE TYPE (H): ICD-10-CM

## 2020-10-19 DIAGNOSIS — I82.4Z9 DEEP VEIN THROMBOSIS (DVT) OF DISTAL VEIN OF LOWER EXTREMITY, UNSPECIFIED CHRONICITY, UNSPECIFIED LATERALITY (H): ICD-10-CM

## 2020-10-19 DIAGNOSIS — I25.10 CORONARY ARTERY DISEASE INVOLVING NATIVE CORONARY ARTERY OF NATIVE HEART WITHOUT ANGINA PECTORIS: ICD-10-CM

## 2020-10-19 LAB
ERYTHROCYTE [DISTWIDTH] IN BLOOD BY AUTOMATED COUNT: 15 % (ref 10–15)
HCT VFR BLD AUTO: 33.4 % (ref 40–53)
HGB BLD-MCNC: 10.7 G/DL (ref 13.3–17.7)
MCH RBC QN AUTO: 32.5 PG (ref 26.5–33)
MCHC RBC AUTO-ENTMCNC: 32 G/DL (ref 31.5–36.5)
MCV RBC AUTO: 102 FL (ref 78–100)
PLATELET # BLD AUTO: 339 10E9/L (ref 150–450)
RBC # BLD AUTO: 3.29 10E12/L (ref 4.4–5.9)
WBC # BLD AUTO: 7.9 10E9/L (ref 4–11)

## 2020-10-19 PROCEDURE — 83880 ASSAY OF NATRIURETIC PEPTIDE: CPT | Performed by: FAMILY MEDICINE

## 2020-10-19 PROCEDURE — 80053 COMPREHEN METABOLIC PANEL: CPT | Performed by: FAMILY MEDICINE

## 2020-10-19 PROCEDURE — 80061 LIPID PANEL: CPT | Performed by: FAMILY MEDICINE

## 2020-10-19 PROCEDURE — 99214 OFFICE O/P EST MOD 30 MIN: CPT | Performed by: FAMILY MEDICINE

## 2020-10-19 PROCEDURE — 36415 COLL VENOUS BLD VENIPUNCTURE: CPT | Performed by: FAMILY MEDICINE

## 2020-10-19 PROCEDURE — 85027 COMPLETE CBC AUTOMATED: CPT | Performed by: FAMILY MEDICINE

## 2020-10-19 RX ORDER — LISINOPRIL 10 MG/1
10 TABLET ORAL DAILY
Qty: 90 TABLET | Refills: 0 | Status: SHIPPED | OUTPATIENT
Start: 2020-10-19 | End: 2020-11-30

## 2020-10-19 RX ORDER — CARVEDILOL 12.5 MG/1
12.5 TABLET ORAL 2 TIMES DAILY WITH MEALS
Qty: 180 TABLET | Refills: 0 | Status: SHIPPED | OUTPATIENT
Start: 2020-10-19 | End: 2020-11-30

## 2020-10-19 RX ORDER — ROSUVASTATIN CALCIUM 20 MG/1
20 TABLET, COATED ORAL DAILY
Qty: 90 TABLET | Refills: 3 | Status: SHIPPED | OUTPATIENT
Start: 2020-10-19 | End: 2021-08-05

## 2020-10-19 ASSESSMENT — MIFFLIN-ST. JEOR: SCORE: 1457.07

## 2020-10-19 NOTE — PROGRESS NOTES
"Subjective     Vaibhav Crabtree is a 70 year old male who presents to clinic today for the following health issues:    HPI         Hyperlipidemia Follow-Up      Are you regularly taking any medication or supplement to lower your cholesterol?   Yes- rosuvastatin    Are you having muscle aches or other side effects that you think could be caused by your cholesterol lowering medication?  No    Hypertension Follow-up      Do you check your blood pressure regularly outside of the clinic? No     Are you following a low salt diet? Yes    Are your blood pressures ever more than 140 on the top number (systolic) OR more   than 90 on the bottom number (diastolic), for example 140/90? No      How many servings of fruits and vegetables do you eat daily?  2-3    On average, how many sweetened beverages do you drink each day (Examples: soda, juice, sweet tea, etc.  Do NOT count diet or artificially sweetened beverages)?   0    How many days per week do you exercise enough to make your heart beat faster? 7    How many minutes a day do you exercise enough to make your heart beat faster? 60 or more    How many days per week do you miss taking your medication? 0      Review of Systems   Constitutional, HEENT, cardiovascular, pulmonary, gi and gu systems are negative, except as otherwise noted.      Objective    BP (!) 80/47   Pulse 74   Temp 97.6  F (36.4  C) (Tympanic)   Ht 1.702 m (5' 7\")   Wt 74.8 kg (165 lb)   SpO2 96%   BMI 25.84 kg/m    Body mass index is 25.84 kg/m .  Physical Exam   GENERAL: healthy, alert and no distress  EYES: Eyes grossly normal to inspection, PERRL and conjunctivae and sclerae normal  HENT: ear canals and TM's normal, nose and mouth without ulcers or lesions  NECK: no adenopathy, no asymmetry, masses, or scars and thyroid normal to palpation  RESP: lungs clear to auscultation - no rales, rhonchi or wheezes  CV: regular rate and rhythm, normal S1 S2, no S3 or S4, no murmur, click or rub, no " peripheral edema and peripheral pulses strong  ABDOMEN: soft, nontender, no hepatosplenomegaly, no masses and bowel sounds normal  MS: no gross musculoskeletal defects noted, no edema            Assessment & Plan     Mixed hyperlipidemia  Fluctuating, will have him to recheck lab for further evaluation   - Lipid panel reflex to direct LDL Fasting  - CBC with platelets  - Comprehensive metabolic panel (BMP + Alb, Alk Phos, ALT, AST, Total. Bili, TP)  - BNP-N terminal pro  - carvedilol (COREG) 12.5 MG tablet; Take 1 tablet (12.5 mg) by mouth 2 times daily (with meals)  - lisinopril (ZESTRIL) 10 MG tablet; Take 1 tablet (10 mg) by mouth daily  - rosuvastatin (CRESTOR) 20 MG tablet; Take 1 tablet (20 mg) by mouth daily    Benign essential hypertension  His BP runs low, will have her to cut the dose of coreg and lisinopril in a half  Will also consider drop the dose of furosemide after lab done   - Lipid panel reflex to direct LDL Fasting  - CBC with platelets  - Comprehensive metabolic panel (BMP + Alb, Alk Phos, ALT, AST, Total. Bili, TP)  - BNP-N terminal pro  - carvedilol (COREG) 12.5 MG tablet; Take 1 tablet (12.5 mg) by mouth 2 times daily (with meals)  - lisinopril (ZESTRIL) 10 MG tablet; Take 1 tablet (10 mg) by mouth daily    Idiopathic cardiomyopathy (H)  Will consider echocardiogram after lab test done   - Lipid panel reflex to direct LDL Fasting  - CBC with platelets  - Comprehensive metabolic panel (BMP + Alb, Alk Phos, ALT, AST, Total. Bili, TP)  - BNP-N terminal pro  - carvedilol (COREG) 12.5 MG tablet; Take 1 tablet (12.5 mg) by mouth 2 times daily (with meals)  - lisinopril (ZESTRIL) 10 MG tablet; Take 1 tablet (10 mg) by mouth daily    Congestive heart failure, unspecified HF chronicity, unspecified heart failure type (H)  Mentioned above, will consider refer him back to cardiology   - Lipid panel reflex to direct LDL Fasting  - CBC with platelets  - Comprehensive metabolic panel (BMP + Alb, Alk Phos,  "ALT, AST, Total. Bili, TP)  - BNP-N terminal pro  - carvedilol (COREG) 12.5 MG tablet; Take 1 tablet (12.5 mg) by mouth 2 times daily (with meals)  - lisinopril (ZESTRIL) 10 MG tablet; Take 1 tablet (10 mg) by mouth daily    Coronary artery disease involving native coronary artery of native heart without angina pectoris  Mentioned above   - Lipid panel reflex to direct LDL Fasting  - CBC with platelets  - Comprehensive metabolic panel (BMP + Alb, Alk Phos, ALT, AST, Total. Bili, TP)  - BNP-N terminal pro  - carvedilol (COREG) 12.5 MG tablet; Take 1 tablet (12.5 mg) by mouth 2 times daily (with meals)  - lisinopril (ZESTRIL) 10 MG tablet; Take 1 tablet (10 mg) by mouth daily    CKD (chronic kidney disease) stage 4, GFR 15-29 ml/min (H)  Worsening, has not been working on hydration   Will review the lab results and update pt   - carvedilol (COREG) 12.5 MG tablet; Take 1 tablet (12.5 mg) by mouth 2 times daily (with meals)  - lisinopril (ZESTRIL) 10 MG tablet; Take 1 tablet (10 mg) by mouth daily    Deep vein thrombosis (DVT) of distal vein of lower extremity, unspecified chronicity, unspecified laterality (H)    - apixaban ANTICOAGULANT (ELIQUIS) 5 MG tablet; Take 1 tablet (5 mg) by mouth 2 times daily     BMI:   Estimated body mass index is 25.84 kg/m  as calculated from the following:    Height as of this encounter: 1.702 m (5' 7\").    Weight as of this encounter: 74.8 kg (165 lb).            FUTURE APPOINTMENTS:       - Follow-up visit in 3 months     No follow-ups on file.    Xander Lee MD  [facility]    "

## 2020-10-20 ENCOUNTER — TELEPHONE (OUTPATIENT)
Dept: FAMILY MEDICINE | Facility: CLINIC | Age: 70
End: 2020-10-20

## 2020-10-20 LAB
ALBUMIN SERPL-MCNC: 3.5 G/DL (ref 3.4–5)
ALP SERPL-CCNC: 50 U/L (ref 40–150)
ALT SERPL W P-5'-P-CCNC: 10 U/L (ref 0–70)
ANION GAP SERPL CALCULATED.3IONS-SCNC: 13 MMOL/L (ref 3–14)
AST SERPL W P-5'-P-CCNC: 12 U/L (ref 0–45)
BILIRUB SERPL-MCNC: 0.4 MG/DL (ref 0.2–1.3)
BUN SERPL-MCNC: 50 MG/DL (ref 7–30)
CALCIUM SERPL-MCNC: 9.2 MG/DL (ref 8.5–10.1)
CHLORIDE SERPL-SCNC: 107 MMOL/L (ref 94–109)
CHOLEST SERPL-MCNC: 164 MG/DL
CO2 SERPL-SCNC: 20 MMOL/L (ref 20–32)
CREAT SERPL-MCNC: 5.47 MG/DL (ref 0.66–1.25)
GFR SERPL CREATININE-BSD FRML MDRD: 9 ML/MIN/{1.73_M2}
GLUCOSE SERPL-MCNC: 126 MG/DL (ref 70–99)
HDLC SERPL-MCNC: 57 MG/DL
LDLC SERPL CALC-MCNC: 84 MG/DL
NONHDLC SERPL-MCNC: 107 MG/DL
NT-PROBNP SERPL-MCNC: 171 PG/ML (ref 0–125)
POTASSIUM SERPL-SCNC: 3.6 MMOL/L (ref 3.4–5.3)
PROT SERPL-MCNC: 7 G/DL (ref 6.8–8.8)
SODIUM SERPL-SCNC: 140 MMOL/L (ref 133–144)
TRIGL SERPL-MCNC: 115 MG/DL

## 2020-11-30 DIAGNOSIS — I50.9 CONGESTIVE HEART FAILURE, UNSPECIFIED HF CHRONICITY, UNSPECIFIED HEART FAILURE TYPE (H): ICD-10-CM

## 2020-11-30 DIAGNOSIS — E78.2 MIXED HYPERLIPIDEMIA: ICD-10-CM

## 2020-11-30 DIAGNOSIS — N18.4 CKD (CHRONIC KIDNEY DISEASE) STAGE 4, GFR 15-29 ML/MIN (H): ICD-10-CM

## 2020-11-30 DIAGNOSIS — I42.9 IDIOPATHIC CARDIOMYOPATHY (H): ICD-10-CM

## 2020-11-30 DIAGNOSIS — I25.10 CORONARY ARTERY DISEASE INVOLVING NATIVE CORONARY ARTERY OF NATIVE HEART WITHOUT ANGINA PECTORIS: ICD-10-CM

## 2020-11-30 DIAGNOSIS — I10 BENIGN ESSENTIAL HYPERTENSION: ICD-10-CM

## 2020-11-30 RX ORDER — CARVEDILOL 12.5 MG/1
TABLET ORAL
Qty: 180 TABLET | Refills: 0 | Status: SHIPPED | OUTPATIENT
Start: 2020-11-30 | End: 2021-02-03

## 2020-11-30 RX ORDER — LISINOPRIL 10 MG/1
TABLET ORAL
Qty: 90 TABLET | Refills: 0 | Status: SHIPPED | OUTPATIENT
Start: 2020-11-30 | End: 2021-02-03

## 2020-11-30 NOTE — TELEPHONE ENCOUNTER
Routing refill request to provider for review/approval because:  Labs out of range:  Cr 5.47 on 10/19/20  Low BP 80/40s    Miranda PEREZ RN  EP Triage

## 2020-12-15 RX ORDER — FUROSEMIDE 20 MG
TABLET ORAL
Qty: 90 TABLET | Refills: 0 | OUTPATIENT
Start: 2020-12-15

## 2021-02-03 ENCOUNTER — OFFICE VISIT (OUTPATIENT)
Dept: FAMILY MEDICINE | Facility: CLINIC | Age: 71
End: 2021-02-03
Payer: COMMERCIAL

## 2021-02-03 VITALS
BODY MASS INDEX: 25.88 KG/M2 | TEMPERATURE: 97.5 F | HEART RATE: 72 BPM | SYSTOLIC BLOOD PRESSURE: 86 MMHG | DIASTOLIC BLOOD PRESSURE: 54 MMHG | OXYGEN SATURATION: 98 % | RESPIRATION RATE: 16 BRPM | HEIGHT: 66 IN | WEIGHT: 161 LBS

## 2021-02-03 DIAGNOSIS — N18.5 CKD (CHRONIC KIDNEY DISEASE) STAGE 5, GFR LESS THAN 15 ML/MIN (H): ICD-10-CM

## 2021-02-03 DIAGNOSIS — Z12.5 SCREENING FOR PROSTATE CANCER: ICD-10-CM

## 2021-02-03 DIAGNOSIS — I10 BENIGN ESSENTIAL HYPERTENSION: ICD-10-CM

## 2021-02-03 DIAGNOSIS — Z12.11 COLON CANCER SCREENING: ICD-10-CM

## 2021-02-03 DIAGNOSIS — Z00.00 HEALTH MAINTENANCE EXAMINATION: Primary | ICD-10-CM

## 2021-02-03 LAB
ALBUMIN SERPL-MCNC: 3.3 G/DL (ref 3.4–5)
ALP SERPL-CCNC: 41 U/L (ref 40–150)
ALT SERPL W P-5'-P-CCNC: 38 U/L (ref 0–70)
ANION GAP SERPL CALCULATED.3IONS-SCNC: 8 MMOL/L (ref 3–14)
AST SERPL W P-5'-P-CCNC: 104 U/L (ref 0–45)
BILIRUB SERPL-MCNC: 0.3 MG/DL (ref 0.2–1.3)
BUN SERPL-MCNC: 40 MG/DL (ref 7–30)
CALCIUM SERPL-MCNC: 9.5 MG/DL (ref 8.5–10.1)
CHLORIDE SERPL-SCNC: 115 MMOL/L (ref 94–109)
CHOLEST SERPL-MCNC: 133 MG/DL
CO2 SERPL-SCNC: 19 MMOL/L (ref 20–32)
CREAT SERPL-MCNC: 4.98 MG/DL (ref 0.66–1.25)
ERYTHROCYTE [DISTWIDTH] IN BLOOD BY AUTOMATED COUNT: 14.6 % (ref 10–15)
GFR SERPL CREATININE-BSD FRML MDRD: 11 ML/MIN/{1.73_M2}
GLUCOSE SERPL-MCNC: 96 MG/DL (ref 70–99)
HCT VFR BLD AUTO: 34.5 % (ref 40–53)
HDLC SERPL-MCNC: 40 MG/DL
HGB BLD-MCNC: 11.1 G/DL (ref 13.3–17.7)
LDLC SERPL CALC-MCNC: 62 MG/DL
MCH RBC QN AUTO: 33.7 PG (ref 26.5–33)
MCHC RBC AUTO-ENTMCNC: 32.2 G/DL (ref 31.5–36.5)
MCV RBC AUTO: 105 FL (ref 78–100)
NONHDLC SERPL-MCNC: 93 MG/DL
PLATELET # BLD AUTO: 398 10E9/L (ref 150–450)
POTASSIUM SERPL-SCNC: 3.9 MMOL/L (ref 3.4–5.3)
PROT SERPL-MCNC: 7.8 G/DL (ref 6.8–8.8)
PSA SERPL-ACNC: 2.45 UG/L (ref 0–4)
RBC # BLD AUTO: 3.29 10E12/L (ref 4.4–5.9)
SODIUM SERPL-SCNC: 142 MMOL/L (ref 133–144)
TRIGL SERPL-MCNC: 155 MG/DL
WBC # BLD AUTO: 7.8 10E9/L (ref 4–11)

## 2021-02-03 PROCEDURE — G0438 PPPS, INITIAL VISIT: HCPCS | Performed by: FAMILY MEDICINE

## 2021-02-03 PROCEDURE — 36415 COLL VENOUS BLD VENIPUNCTURE: CPT | Performed by: FAMILY MEDICINE

## 2021-02-03 PROCEDURE — 80053 COMPREHEN METABOLIC PANEL: CPT | Performed by: FAMILY MEDICINE

## 2021-02-03 PROCEDURE — G0103 PSA SCREENING: HCPCS | Performed by: FAMILY MEDICINE

## 2021-02-03 PROCEDURE — 99214 OFFICE O/P EST MOD 30 MIN: CPT | Mod: 25 | Performed by: FAMILY MEDICINE

## 2021-02-03 PROCEDURE — 80061 LIPID PANEL: CPT | Performed by: FAMILY MEDICINE

## 2021-02-03 PROCEDURE — 85027 COMPLETE CBC AUTOMATED: CPT | Performed by: FAMILY MEDICINE

## 2021-02-03 RX ORDER — CARVEDILOL 6.25 MG/1
6.25 TABLET ORAL 2 TIMES DAILY WITH MEALS
Qty: 180 TABLET | Refills: 1 | Status: SHIPPED | OUTPATIENT
Start: 2021-02-03 | End: 2021-08-11

## 2021-02-03 RX ORDER — LISINOPRIL 5 MG/1
5 TABLET ORAL DAILY
Qty: 90 TABLET | Refills: 1 | Status: SHIPPED | OUTPATIENT
Start: 2021-02-03 | End: 2021-08-11

## 2021-02-03 ASSESSMENT — MIFFLIN-ST. JEOR: SCORE: 1427.01

## 2021-02-03 ASSESSMENT — ACTIVITIES OF DAILY LIVING (ADL): CURRENT_FUNCTION: NO ASSISTANCE NEEDED

## 2021-02-03 NOTE — PROGRESS NOTES
"SUBJECTIVE:   Vaibhav Crabtree is a 70 year old male who presents for Preventive Visit.      Patient has been advised of split billing requirements and indicates understanding: Yes   Are you in the first 12 months of your Medicare coverage?  No    Healthy Habits:     In general, how would you rate your overall health?  Good    Frequency of exercise:  6-7 days/week    Duration of exercise:  15-30 minutes    Do you usually eat at least 4 servings of fruit and vegetables a day, include whole grains    & fiber and avoid regularly eating high fat or \"junk\" foods?  No    Taking medications regularly:  Yes    Barriers to taking medications:  None    Medication side effects:  None    Ability to successfully perform activities of daily living:  No assistance needed    Home Safety:  No safety concerns identified    Hearing Impairment:  No hearing concerns    In the past 6 months, have you been bothered by leaking of urine?  No    In general, how would you rate your overall mental or emotional health?  Good      PHQ-2 Total Score: 0    Additional concerns today:  No    Do you feel safe in your environment? Yes    Have you ever done Advance Care Planning? (For example, a Health Directive, POLST, or a discussion with a medical provider or your loved ones about your wishes): Yes, advance care planning is on file.       Fall risk  Fallen 2 or more times in the past year?: No  Any fall with injury in the past year?: No  click delete button to remove this line now  Cognitive Screening   1) Repeat 3 items (Leader, Season, Table)    2) Clock draw: NORMAL  3) 3 item recall: Recalls 1 object   Results: NORMAL clock, 1-2 items recalled: COGNITIVE IMPAIRMENT LESS LIKELY    Mini-CogTM Copyright DEMETRIUS Liang. Licensed by the author for use in [facility]; reprinted with permission (gilbert@.[facility]). All rights reserved.      Do you have sleep apnea, excessive snoring or daytime drowsiness?: no    Reviewed and updated as needed this " visit by clinical staff  Tobacco  Allergies  Meds   Med Hx  Surg Hx  Fam Hx  Soc Hx        Reviewed and updated as needed this visit by Provider                Social History     Tobacco Use     Smoking status: Former Smoker     Packs/day: 4.00     Years: 15.00     Pack years: 60.00     Types: Cigarettes     Quit date: 1971     Years since quittin.6     Smokeless tobacco: Never Used   Substance Use Topics     Alcohol use: Yes     Alcohol/week: 0.0 standard drinks     Comment: occ. beer     If you drink alcohol do you typically have >3 drinks per day or >7 drinks per week? No    No flowsheet data found.    Current providers sharing in care for this patient include:   Patient Care Team:  Xander Lee MD as PCP - General (Family Practice)  Xander Lee MD as Assigned PCP    The following health maintenance items are reviewed in Epic and correct as of today:  Health Maintenance   Topic Date Due     HF ACTION PLAN  1950     HEPATITIS C SCREENING  1966     ZOSTER IMMUNIZATION (1 of 2) 1998     DTAP/TDAP/TD IMMUNIZATION (2 - Td) 10/28/2008     MEDICARE ANNUAL WELLNESS VISIT  2013     COLORECTAL CANCER SCREENING  2017     FALL RISK ASSESSMENT  2017     INFLUENZA VACCINE (1) 2020     BMP  2021     ALT  10/19/2021     LIPID  10/19/2021     CBC  10/19/2021     ADVANCE CARE PLANNING  2024     TSH W/FREE T4 REFLEX  Completed     PHQ-2  Completed     Pneumococcal Vaccine: 65+ Years  Completed     AORTIC ANEURYSM SCREENING (SYSTEM ASSIGNED)  Completed     Pneumococcal Vaccine: Pediatrics (0 to 5 Years) and At-Risk Patients (6 to 64 Years)  Aged Out     IPV IMMUNIZATION  Aged Out     MENINGITIS IMMUNIZATION  Aged Out     HEPATITIS B IMMUNIZATION  Aged Out     BP Readings from Last 3 Encounters:   21 (!) 86/54   10/19/20 (!) 80/47   20 114/82    Wt Readings from Last 3 Encounters:   21 73 kg (161 lb)   10/19/20 74.8 kg (165 lb)   20 80.6 kg  (177 lb 12.8 oz)                  Patient Active Problem List   Diagnosis     Dyspnea and respiratory abnormality     Impotence of organic origin     Adjustment reaction     CARDIOVASCULAR SCREENING; LDL GOAL LESS THAN 130     Pain, joint, knee, right     Advanced directives, counseling/discussion     Arthritis of knee, right     Unstable angina (H)     Idiopathic cardiomyopathy (H)     Congestive heart failure (H)     Hypertension     Hyperlipidemia     CAD (coronary artery disease)     Mitral regurgitation     Atypical chest pain     Claudication of right lower extremity (H)     RAYSHAWN (acute kidney injury) (H)     Orthostatic hypotension     Anemia, unspecified     Intestinal malabsorption, unspecified     Iron adverse reaction     Need for prophylactic measure     S/P TKR (total knee replacement) not using cement, right     CKD (chronic kidney disease) stage 4, GFR 15-29 ml/min (H)     Past Surgical History:   Procedure Laterality Date     ARTHROPLASTY KNEE Right 5/24/2017    Procedure: ARTHROPLASTY KNEE;  RIGHT TOTAL KNEE ARTHROPLASTY (BIOMET)^ ;  Surgeon: Marco A Iyer MD;  Location:  OR     ESOPHAGOSCOPY, GASTROSCOPY, DUODENOSCOPY (EGD), COMBINED N/A 1/24/2017    Procedure: COMBINED ESOPHAGOSCOPY, GASTROSCOPY, DUODENOSCOPY (EGD), BIOPSY SINGLE OR MULTIPLE;  Surgeon: Reji Mcghee MD;  Location:  GI     ESOPHAGOSCOPY, GASTROSCOPY, DUODENOSCOPY (EGD), COMBINED N/A 5/26/2017    Procedure: COMBINED ESOPHAGOSCOPY, GASTROSCOPY, DUODENOSCOPY (EGD);  gastroscopy;  Surgeon: Renae Mendosa MD;  Location:  GI     HC REMOVAL OF TONSILS,<13 Y/O      Tonsils <10y.o.     HC REPAIR ROTATOR CUFF,ACUTE  02 and 03    left rotator cuff repair     [facility] NONSPECIFIC PROCEDURE  2002    ulnar nerve     [facility] NONSPECIFIC PROCEDURE      right rotator cuff repair       Social History     Tobacco Use     Smoking status: Former Smoker     Packs/day: 4.00     Years: 15.00     Pack years: 60.00     Types: Cigarettes     Quit  date: 1971     Years since quittin.6     Smokeless tobacco: Never Used   Substance Use Topics     Alcohol use: Yes     Alcohol/week: 0.0 standard drinks     Comment: occ. beer     Family History   Problem Relation Age of Onset     Respiratory Father         COPD     Unknown/Adopted Mother      Cancer Brother      Cancer Brother      Connective Tissue Disorder Paternal Grandfather      Depression Daughter      Eye Disorder Son          Current Outpatient Medications   Medication Sig Dispense Refill     apixaban ANTICOAGULANT (ELIQUIS) 5 MG tablet Take 1 tablet (5 mg) by mouth 2 times daily 180 tablet 1     carvedilol (COREG) 6.25 MG tablet Take 1 tablet (6.25 mg) by mouth 2 times daily (with meals) 180 tablet 1     ferrous gluconate (FERGON) 324 (38 Fe) MG tablet Take 1 tablet (324 mg) by mouth daily (with breakfast) 90 tablet 1     lisinopril (ZESTRIL) 5 MG tablet Take 1 tablet (5 mg) by mouth daily 90 tablet 1     rivaroxaban ANTICOAGULANT (XARELTO) 15 MG TABS tablet Take 15 mg by mouth 2 times daily (with meals)       rivaroxaban ANTICOAGULANT (XARELTO) 20 MG TABS tablet Take 1 tablet (20 mg) by mouth daily (with dinner) 90 tablet 1     rosuvastatin (CRESTOR) 20 MG tablet Take 1 tablet (20 mg) by mouth daily 90 tablet 3     tamsulosin (FLOMAX) 0.4 MG capsule TAKE ONE CAPSULE BY MOUTH ONE TIME DAILY 90 capsule 1     Allergies   Allergen Reactions     No Known Allergies      Recent Labs   Lab Test 10/19/20  1104 20  0950 19  1000 01/12/15  0525 01/12/15  0525 01/11/15  1240 14  0255 14  0255   LDL 84 227* 153*  --  96  --    < >  --    HDL 57 47  --   --  77  --    < >  --    TRIG 115 147  --   --  130  --    < >  --    ALT 10 41 59   < > 34 43  --   --    CR 5.47* 2.36* 1.21   < > 1.13 1.21   < > 1.11   GFRESTIMATED 9* 27* 60*   < > 65 60*   < > 66   GFRESTBLACK 11* 31* 70   < > 78 72   < > 80   POTASSIUM 3.6 4.7 3.5   < > 4.3 4.0   < >  --    TSH  --   --   --   --   --  1.90  --   "6.58*    < > = values in this interval not displayed.          Review of Systems  Constitutional, HEENT, cardiovascular, pulmonary, gi and gu systems are negative, except as otherwise noted.    OBJECTIVE:   BP (!) 86/54   Pulse 72   Temp 97.5  F (36.4  C)   Resp 16   Ht 1.683 m (5' 6.25\")   Wt 73 kg (161 lb)   SpO2 98%   BMI 25.79 kg/m   Estimated body mass index is 25.79 kg/m  as calculated from the following:    Height as of this encounter: 1.683 m (5' 6.25\").    Weight as of this encounter: 73 kg (161 lb).  Physical Exam  GENERAL: healthy, alert and no distress  EYES: Eyes grossly normal to inspection, PERRL and conjunctivae and sclerae normal  HENT: ear canals and TM's normal, nose and mouth without ulcers or lesions  NECK: no adenopathy, no asymmetry, masses, or scars and thyroid normal to palpation  RESP: lungs clear to auscultation - no rales, rhonchi or wheezes  CV: regular rate and rhythm, normal S1 S2, no S3 or S4, no murmur, click or rub, no peripheral edema and peripheral pulses strong  ABDOMEN: soft, nontender, no hepatosplenomegaly, no masses and bowel sounds normal  MS: no gross musculoskeletal defects noted, no edema        ASSESSMENT / PLAN:   1. Health maintenance examination    - CBC with platelets  - Comprehensive metabolic panel (BMP + Alb, Alk Phos, ALT, AST, Total. Bili, TP)  - Lipid panel reflex to direct LDL Fasting  - PSA, screen  - GASTROENTEROLOGY ADULT REF PROCEDURE ONLY; Future    2. Screening for prostate cancer    - PSA, screen    3. Colon cancer screening    - GASTROENTEROLOGY ADULT REF PROCEDURE ONLY; Future    4. Benign essential hypertension  His BP has been running low with sx,  Will have him to adjust the dose of med down to 1/2  He also has past h/o CKD4 to 5, will recheck lab and consider referral to renal speclilaist    - carvedilol (COREG) 6.25 MG tablet; Take 1 tablet (6.25 mg) by mouth 2 times daily (with meals)  Dispense: 180 tablet; Refill: 1  - lisinopril " "(ZESTRIL) 5 MG tablet; Take 1 tablet (5 mg) by mouth daily  Dispense: 90 tablet; Refill: 1    Patient has been advised of split billing requirements and indicates understanding: Yes  COUNSELING:  Reviewed preventive health counseling, as reflected in patient instructions    Estimated body mass index is 25.84 kg/m  as calculated from the following:    Height as of 10/19/20: 1.702 m (5' 7\").    Weight as of 10/19/20: 74.8 kg (165 lb).        He reports that he quit smoking about 49 years ago. His smoking use included cigarettes. He has a 60.00 pack-year smoking history. He has never used smokeless tobacco.      Appropriate preventive services were discussed with this patient, including applicable screening as appropriate for cardiovascular disease, diabetes, osteopenia/osteoporosis, and glaucoma.  As appropriate for age/gender, discussed screening for colorectal cancer, prostate cancer, breast cancer, and cervical cancer. Checklist reviewing preventive services available has been given to the patient.    Reviewed patients plan of care and provided an AVS. The Basic Care Plan (routine screening as documented in Health Maintenance) for Vaibhav meets the Care Plan requirement. This Care Plan has been established and reviewed with the Patient.    Counseling Resources:  ATP IV Guidelines  Pooled Cohorts Equation Calculator  Breast Cancer Risk Calculator  Breast Cancer: Medication to Reduce Risk  FRAX Risk Assessment  ICSI Preventive Guidelines  Dietary Guidelines for Americans, 2010  [employer] MyPlate  ASA Prophylaxis  Lung CA Screening    Xander Lee MD  [facility]    Identified Health Risks:  "

## 2021-02-03 NOTE — LETTER
February 4, 2021      Vaibhav SIMON Bro  [address] [address]        Dear Bry,     You maybe able to check the lab results via [employer], it showed your lab results including prostate specific antigen/cholesterol indexes/electrolyte balance and glucose were all normal. Your liver enzyme is slightly elevated, please work on sober and eating balance diet with low fat/carb ingredient.   However, your renal function is still significantly suppressed with very high creatinine. As was suggested, we strongly recommend you to see renal specialist to prevent renal failure and ending up with dialysis. I will have [facility] scheduling department to call you to help you schedule with them. If you miss the phone call, please call [phone].       Thank you,     Resulted Orders   CBC with platelets   Result Value Ref Range    WBC 7.8 4.0 - 11.0 10e9/L    RBC Count 3.29 (L) 4.4 - 5.9 10e12/L    Hemoglobin 11.1 (L) 13.3 - 17.7 g/dL      Comment:      Results confirmed by repeat test    Hematocrit 34.5 (L) 40.0 - 53.0 %     (H) 78 - 100 fl    MCH 33.7 (H) 26.5 - 33.0 pg    MCHC 32.2 31.5 - 36.5 g/dL    RDW 14.6 10.0 - 15.0 %    Platelet Count 398 150 - 450 10e9/L   Comprehensive metabolic panel (BMP + Alb, Alk Phos, ALT, AST, Total. Bili, TP)   Result Value Ref Range    Sodium 142 133 - 144 mmol/L    Potassium 3.9 3.4 - 5.3 mmol/L    Chloride 115 (H) 94 - 109 mmol/L    Carbon Dioxide 19 (L) 20 - 32 mmol/L    Anion Gap 8 3 - 14 mmol/L    Glucose 96 70 - 99 mg/dL    Urea Nitrogen 40 (H) 7 - 30 mg/dL    Creatinine 4.98 (H) 0.66 - 1.25 mg/dL    GFR Estimate 11 (L) >60 mL/min/[1.73_m2]      Comment:      Non  GFR Calc  Starting 12/18/2018, serum creatinine based estimated GFR (eGFR) will be   calculated using the Chronic Kidney Disease Epidemiology Collaboration   (CKD-EPI) equation.      GFR Estimate If Black 12 (L) >60 mL/min/[1.73_m2]      Comment:       GFR  Calc  Starting 12/18/2018, serum creatinine based estimated GFR (eGFR) will be   calculated using the Chronic Kidney Disease Epidemiology Collaboration   (CKD-EPI) equation.      Calcium 9.5 8.5 - 10.1 mg/dL    Bilirubin Total 0.3 0.2 - 1.3 mg/dL    Albumin 3.3 (L) 3.4 - 5.0 g/dL    Protein Total 7.8 6.8 - 8.8 g/dL    Alkaline Phosphatase 41 40 - 150 U/L    ALT 38 0 - 70 U/L     (H) 0 - 45 U/L   Lipid panel reflex to direct LDL Fasting   Result Value Ref Range    Cholesterol 133 <200 mg/dL    Triglycerides 155 (H) <150 mg/dL      Comment:      Borderline high:  150-199 mg/dl  High:             200-499 mg/dl  Very high:       >499 mg/dl      HDL Cholesterol 40 >39 mg/dL    LDL Cholesterol Calculated 62 <100 mg/dL      Comment:      Desirable:       <100 mg/dl    Non HDL Cholesterol 93 <130 mg/dL   PSA, screen   Result Value Ref Range    PSA 2.45 0 - 4 ug/L      Comment:      Assay Method:  Chemiluminescence using Siemens Vista analyzer             Xander Lee MD

## 2021-02-12 DIAGNOSIS — R30.0 DYSURIA: ICD-10-CM

## 2021-02-12 DIAGNOSIS — I10 BENIGN ESSENTIAL HYPERTENSION: ICD-10-CM

## 2021-02-12 DIAGNOSIS — R10.9 FLANK PAIN: ICD-10-CM

## 2021-02-12 DIAGNOSIS — M54.9 ACUTE BILATERAL BACK PAIN, UNSPECIFIED BACK LOCATION: ICD-10-CM

## 2021-02-14 NOTE — TELEPHONE ENCOUNTER
"Failed protocol.  please route to  team if patient needs an appointment     Analy REDDYRN BSN  [facility]  [phone]  Requested Prescriptions   Pending Prescriptions Disp Refills     tamsulosin (FLOMAX) 0.4 MG capsule [Pharmacy Med Name: Tamsulosin HCl Oral Capsule 0.4 MG] 90 capsule 0     Sig: TAKE ONE CAPSULE BY MOUTH ONE TIME DAILY       Alpha Blockers Passed - 2/12/2021  5:36 PM        Passed - Blood pressure under 140/90 in past 12 months     BP Readings from Last 3 Encounters:   02/03/21 (!) 86/54   10/19/20 (!) 80/47   03/18/20 114/82                 Passed - Recent (12 mo) or future (30 days) visit within the authorizing provider's specialty     Patient has had an office visit with the authorizing provider or a provider within the authorizing providers department within the previous 12 mos or has a future within next 30 days. See \"Patient Info\" tab in inbasket, or \"Choose Columns\" in Meds & Orders section of the refill encounter.              Passed - Patient does not have Tadalafil, Vardenafil, or Sildenafil on their medication list        Passed - Medication is active on med list        Passed - Patient is 18 years of age or older           furosemide (LASIX) 20 MG tablet [Pharmacy Med Name: Furosemide Oral Tablet 20 MG] 90 tablet 0     Sig: TAKE ONE TABLET BY MOUTH DAILY AS NEEDED       Diuretics (Including Combos) Protocol Failed - 2/12/2021  5:36 PM        Failed - Medication is active on med list        Failed - Normal serum creatinine on file in past 12 months     Recent Labs   Lab Test 02/03/21  1026   CR 4.98*              Passed - Blood pressure under 140/90 in past 12 months     BP Readings from Last 3 Encounters:   02/03/21 (!) 86/54   10/19/20 (!) 80/47   03/18/20 114/82                 Passed - Recent (12 mo) or future (30 days) visit within the authorizing provider's specialty     Patient has had an office visit with the authorizing provider or a provider " "within the authorizing providers department within the previous 12 mos or has a future within next 30 days. See \"Patient Info\" tab in inbasket, or \"Choose Columns\" in Meds & Orders section of the refill encounter.              Passed - Patient is age 18 or older        Passed - Normal serum potassium on file in past 12 months     Recent Labs   Lab Test 02/03/21  1026   POTASSIUM 3.9                    Passed - Normal serum sodium on file in past 12 months     Recent Labs   Lab Test 02/03/21  1026                      "

## 2021-02-15 RX ORDER — TAMSULOSIN HYDROCHLORIDE 0.4 MG/1
CAPSULE ORAL
Qty: 90 CAPSULE | Refills: 0 | Status: SHIPPED | OUTPATIENT
Start: 2021-02-15 | End: 2021-05-12

## 2021-02-15 RX ORDER — FUROSEMIDE 20 MG
TABLET ORAL
Qty: 90 TABLET | Refills: 0 | Status: SHIPPED | OUTPATIENT
Start: 2021-02-15 | End: 2021-05-12

## 2021-03-11 ENCOUNTER — IMMUNIZATION (OUTPATIENT)
Dept: NURSING | Facility: CLINIC | Age: 71
End: 2021-03-11
Payer: COMMERCIAL

## 2021-03-11 PROCEDURE — 0001A PR COVID VAC PFIZER DIL RECON 30 MCG/0.3 ML IM: CPT

## 2021-03-11 PROCEDURE — 91300 PR COVID VAC PFIZER DIL RECON 30 MCG/0.3 ML IM: CPT

## 2021-04-01 ENCOUNTER — IMMUNIZATION (OUTPATIENT)
Dept: NURSING | Facility: CLINIC | Age: 71
End: 2021-04-01
Attending: INTERNAL MEDICINE
Payer: COMMERCIAL

## 2021-04-01 PROCEDURE — 0002A PR COVID VAC PFIZER DIL RECON 30 MCG/0.3 ML IM: CPT

## 2021-04-01 PROCEDURE — 91300 PR COVID VAC PFIZER DIL RECON 30 MCG/0.3 ML IM: CPT

## 2021-05-12 DIAGNOSIS — R10.9 FLANK PAIN: ICD-10-CM

## 2021-05-12 DIAGNOSIS — M54.9 ACUTE BILATERAL BACK PAIN, UNSPECIFIED BACK LOCATION: ICD-10-CM

## 2021-05-12 DIAGNOSIS — R30.0 DYSURIA: ICD-10-CM

## 2021-05-12 DIAGNOSIS — I10 BENIGN ESSENTIAL HYPERTENSION: ICD-10-CM

## 2021-05-12 RX ORDER — FUROSEMIDE 20 MG
TABLET ORAL
Qty: 90 TABLET | Refills: 0 | Status: SHIPPED | OUTPATIENT
Start: 2021-05-12 | End: 2021-08-10

## 2021-05-12 RX ORDER — TAMSULOSIN HYDROCHLORIDE 0.4 MG/1
CAPSULE ORAL
Qty: 90 CAPSULE | Refills: 0 | Status: SHIPPED | OUTPATIENT
Start: 2021-05-12 | End: 2021-08-11

## 2021-05-12 NOTE — TELEPHONE ENCOUNTER
Routing refill request to provider for review/approval because:  Labs out of range:  Cr 4.98 on 2/3/21  Failed BP protocol    Miranda PEREZ RN  EP Triage

## 2021-05-12 NOTE — TELEPHONE ENCOUNTER
Prescription approved per [facility] Refill Protocol for tamsulosin    Miranda PEREZ RN  EP Triage

## 2021-08-04 DIAGNOSIS — I10 BENIGN ESSENTIAL HYPERTENSION: ICD-10-CM

## 2021-08-04 DIAGNOSIS — R30.0 DYSURIA: ICD-10-CM

## 2021-08-04 DIAGNOSIS — M54.9 ACUTE BILATERAL BACK PAIN, UNSPECIFIED BACK LOCATION: ICD-10-CM

## 2021-08-04 DIAGNOSIS — R10.9 FLANK PAIN: ICD-10-CM

## 2021-08-04 DIAGNOSIS — E78.2 MIXED HYPERLIPIDEMIA: ICD-10-CM

## 2021-08-04 RX ORDER — FUROSEMIDE 20 MG
TABLET ORAL
Qty: 90 TABLET | Refills: 0 | Status: CANCELLED | OUTPATIENT
Start: 2021-08-04

## 2021-08-05 RX ORDER — ROSUVASTATIN CALCIUM 20 MG/1
TABLET, COATED ORAL
Qty: 90 TABLET | Refills: 1 | Status: SHIPPED | OUTPATIENT
Start: 2021-08-05 | End: 2022-02-25

## 2021-08-05 NOTE — TELEPHONE ENCOUNTER
Failed protocol.  Ok to refill atorvastatin    please route to  team if patient needs an appointment     Analy REDDY RN BSN  [facility]  [phone]

## 2021-08-07 DIAGNOSIS — I10 BENIGN ESSENTIAL HYPERTENSION: ICD-10-CM

## 2021-08-10 RX ORDER — FUROSEMIDE 20 MG
TABLET ORAL
Qty: 90 TABLET | Refills: 0 | Status: SHIPPED | OUTPATIENT
Start: 2021-08-10 | End: 2021-11-08

## 2021-08-10 NOTE — TELEPHONE ENCOUNTER
Creatinine   Date Value Ref Range Status   02/03/2021 4.98 (H) 0.66 - 1.25 mg/dL Final     Please refill as appropriate.    Merna Anglin RN  [facility]

## 2021-08-11 RX ORDER — FUROSEMIDE 20 MG
TABLET ORAL
Qty: 90 TABLET | Refills: 0 | OUTPATIENT
Start: 2021-08-11

## 2021-08-11 RX ORDER — CARVEDILOL 6.25 MG/1
TABLET ORAL
Qty: 180 TABLET | Refills: 0 | Status: SHIPPED | OUTPATIENT
Start: 2021-08-11 | End: 2021-08-19

## 2021-08-11 RX ORDER — TAMSULOSIN HYDROCHLORIDE 0.4 MG/1
CAPSULE ORAL
Qty: 90 CAPSULE | Refills: 0 | Status: SHIPPED | OUTPATIENT
Start: 2021-08-11 | End: 2021-11-08

## 2021-08-11 RX ORDER — LISINOPRIL 5 MG/1
TABLET ORAL
Qty: 90 TABLET | Refills: 0 | Status: SHIPPED | OUTPATIENT
Start: 2021-08-11 | End: 2021-11-08

## 2021-08-11 NOTE — TELEPHONE ENCOUNTER
Routing refill request to provider for review/approval because:  Labs not current:  Patient has scheduled, but triage still can't refill due to labs not current.  Germania Adams RN

## 2021-08-19 ENCOUNTER — OFFICE VISIT (OUTPATIENT)
Dept: FAMILY MEDICINE | Facility: CLINIC | Age: 71
End: 2021-08-19
Payer: COMMERCIAL

## 2021-08-19 VITALS
HEART RATE: 70 BPM | BODY MASS INDEX: 26.59 KG/M2 | WEIGHT: 166 LBS | SYSTOLIC BLOOD PRESSURE: 90 MMHG | DIASTOLIC BLOOD PRESSURE: 60 MMHG | RESPIRATION RATE: 18 BRPM | TEMPERATURE: 97 F | OXYGEN SATURATION: 99 %

## 2021-08-19 DIAGNOSIS — I42.9 IDIOPATHIC CARDIOMYOPATHY (H): ICD-10-CM

## 2021-08-19 DIAGNOSIS — I82.4Z9 DEEP VEIN THROMBOSIS (DVT) OF DISTAL VEIN OF LOWER EXTREMITY, UNSPECIFIED CHRONICITY, UNSPECIFIED LATERALITY (H): ICD-10-CM

## 2021-08-19 DIAGNOSIS — N18.5 CKD (CHRONIC KIDNEY DISEASE) STAGE 5, GFR LESS THAN 15 ML/MIN (H): Primary | ICD-10-CM

## 2021-08-19 PROCEDURE — 36415 COLL VENOUS BLD VENIPUNCTURE: CPT | Performed by: FAMILY MEDICINE

## 2021-08-19 PROCEDURE — 80048 BASIC METABOLIC PNL TOTAL CA: CPT | Performed by: FAMILY MEDICINE

## 2021-08-19 PROCEDURE — 99214 OFFICE O/P EST MOD 30 MIN: CPT | Performed by: FAMILY MEDICINE

## 2021-08-19 ASSESSMENT — PAIN SCALES - GENERAL: PAINLEVEL: NO PAIN (0)

## 2021-08-19 NOTE — PROGRESS NOTES
"    Assessment & Plan     Idiopathic cardiomyopathy (H)  Has been stable, will have him to monitor closely   Asked him to recheck echocardiogram for f/u , pt declined   - REVIEW OF HEALTH MAINTENANCE PROTOCOL ORDERS    Deep vein thrombosis (DVT) of distal vein of lower extremity, unspecified chronicity, unspecified laterality (H)  Has no clinical sx, asked him to US LE, pt declined     CKD (chronic kidney disease) stage 5, GFR less than 15 ml/min (H)  Has been stable, will consider referral to nephrologist, unfortunately pt has been declined   - REVIEW OF HEALTH MAINTENANCE PROTOCOL ORDERS  - BASIC METABOLIC PANEL; Future  - Albumin Random Urine Quantitative with Creat Ratio; Future      35 minutes spent on the date of the encounter doing chart review, history and exam, documentation and further activities per the note       BMI:   Estimated body mass index is 26.59 kg/m  as calculated from the following:    Height as of 2/3/21: 1.683 m (5' 6.25\").    Weight as of this encounter: 75.3 kg (166 lb).   Weight management plan: Discussed healthy diet and exercise guidelines    FUTURE APPOINTMENTS:       - Follow-up visit in 6 months     No follow-ups on file.    Xander Lee MD  [facility] SHIRA Londono is a 71 year old who presents for the following health issues     HPI     Medication Followup of  All medications     Taking Medication as prescribed: yes    Side Effects:  None    Medication Helping Symptoms:  yes         Review of Systems   Constitutional, HEENT, cardiovascular, pulmonary, gi and gu systems are negative, except as otherwise noted.      Objective    BP 90/60   Pulse 70   Temp 97  F (36.1  C) (Tympanic)   Resp 18   Wt 75.3 kg (166 lb)   SpO2 99%   BMI 26.59 kg/m    Body mass index is 26.59 kg/m .  Physical Exam   GENERAL: healthy, alert and no distress  NECK: no adenopathy, no asymmetry, masses, or scars and thyroid normal to palpation  RESP: lungs clear to " auscultation - no rales, rhonchi or wheezes  CV: regular rate and rhythm, normal S1 S2, no S3 or S4, no murmur, click or rub, no peripheral edema and peripheral pulses strong  ABDOMEN: soft, nontender, no hepatosplenomegaly, no masses and bowel sounds normal  MS: no gross musculoskeletal defects noted, no edema

## 2021-08-20 LAB
ANION GAP SERPL CALCULATED.3IONS-SCNC: 3 MMOL/L (ref 3–14)
BUN SERPL-MCNC: 28 MG/DL (ref 7–30)
CALCIUM SERPL-MCNC: 8.5 MG/DL (ref 8.5–10.1)
CHLORIDE BLD-SCNC: 113 MMOL/L (ref 94–109)
CO2 SERPL-SCNC: 26 MMOL/L (ref 20–32)
CREAT SERPL-MCNC: 4.31 MG/DL (ref 0.66–1.25)
GFR SERPL CREATININE-BSD FRML MDRD: 13 ML/MIN/1.73M2
GLUCOSE BLD-MCNC: 96 MG/DL (ref 70–99)
POTASSIUM BLD-SCNC: 3.5 MMOL/L (ref 3.4–5.3)
SODIUM SERPL-SCNC: 142 MMOL/L (ref 133–144)

## 2021-11-08 DIAGNOSIS — I10 BENIGN ESSENTIAL HYPERTENSION: ICD-10-CM

## 2021-11-08 DIAGNOSIS — R10.9 FLANK PAIN: ICD-10-CM

## 2021-11-08 DIAGNOSIS — R30.0 DYSURIA: ICD-10-CM

## 2021-11-08 DIAGNOSIS — M54.9 ACUTE BILATERAL BACK PAIN, UNSPECIFIED BACK LOCATION: ICD-10-CM

## 2021-11-08 RX ORDER — LISINOPRIL 5 MG/1
TABLET ORAL
Qty: 90 TABLET | Refills: 0 | Status: SHIPPED | OUTPATIENT
Start: 2021-11-08 | End: 2022-02-25

## 2021-11-08 RX ORDER — TAMSULOSIN HYDROCHLORIDE 0.4 MG/1
CAPSULE ORAL
Qty: 90 CAPSULE | Refills: 0 | Status: SHIPPED | OUTPATIENT
Start: 2021-11-08 | End: 2022-02-25

## 2021-11-08 RX ORDER — FUROSEMIDE 20 MG
TABLET ORAL
Qty: 90 TABLET | Refills: 0 | Status: SHIPPED | OUTPATIENT
Start: 2021-11-08 | End: 2022-02-25

## 2021-11-08 RX ORDER — CARVEDILOL 6.25 MG/1
TABLET ORAL
Qty: 180 TABLET | Refills: 0 | Status: SHIPPED | OUTPATIENT
Start: 2021-11-08 | End: 2022-02-25

## 2021-11-08 NOTE — TELEPHONE ENCOUNTER
Routing refill request to provider for review/approval because:  Drug not active on patient's medication list  Labs out of range:  Misha PEREZ RN  EP Triage

## 2021-11-08 NOTE — TELEPHONE ENCOUNTER
Prescription approved per [facility] Refill Protocol.    Miranda PEREZ RN  EP Triage

## 2022-02-18 DIAGNOSIS — R10.9 FLANK PAIN: ICD-10-CM

## 2022-02-18 DIAGNOSIS — M54.9 ACUTE BILATERAL BACK PAIN, UNSPECIFIED BACK LOCATION: ICD-10-CM

## 2022-02-18 DIAGNOSIS — R30.0 DYSURIA: ICD-10-CM

## 2022-02-18 DIAGNOSIS — I10 BENIGN ESSENTIAL HYPERTENSION: ICD-10-CM

## 2022-02-18 DIAGNOSIS — E78.2 MIXED HYPERLIPIDEMIA: ICD-10-CM

## 2022-02-19 NOTE — TELEPHONE ENCOUNTER
Failed protocol.  please route to  team if patient needs an appointment     Analy REDDYRN BSN  [facility]  [phone]

## 2022-02-23 NOTE — TELEPHONE ENCOUNTER
Left message for pt to call back, please schedule a in person with dr lee in one of his same day spots ok per Dr Lee.     Daria Higgins MA

## 2022-02-25 RX ORDER — LISINOPRIL 5 MG/1
TABLET ORAL
Qty: 90 TABLET | Refills: 0 | Status: SHIPPED | OUTPATIENT
Start: 2022-02-25 | End: 2022-05-27

## 2022-02-25 RX ORDER — CARVEDILOL 6.25 MG/1
TABLET ORAL
Qty: 180 TABLET | Refills: 0 | Status: SHIPPED | OUTPATIENT
Start: 2022-02-25 | End: 2022-05-27

## 2022-02-25 RX ORDER — TAMSULOSIN HYDROCHLORIDE 0.4 MG/1
CAPSULE ORAL
Qty: 90 CAPSULE | Refills: 0 | Status: SHIPPED | OUTPATIENT
Start: 2022-02-25 | End: 2022-05-27

## 2022-02-25 RX ORDER — FUROSEMIDE 20 MG
TABLET ORAL
Qty: 90 TABLET | Refills: 0 | Status: SHIPPED | OUTPATIENT
Start: 2022-02-25 | End: 2022-05-27

## 2022-02-25 RX ORDER — ROSUVASTATIN CALCIUM 20 MG/1
TABLET, COATED ORAL
Qty: 90 TABLET | Refills: 0 | Status: SHIPPED | OUTPATIENT
Start: 2022-02-25 | End: 2022-05-27

## 2022-02-25 NOTE — TELEPHONE ENCOUNTER
Prescription approved per [facility] Refill Protocol.  Has appt on 2/28 with Dr. Lee.    Miranda PEREZ RN  EP Triage

## 2022-02-25 NOTE — TELEPHONE ENCOUNTER
2nd attempt: LM for callback.    Reason: please schedule a in person with dr lee in one of his same day spots ok per Dr Lee.     Alma COOMBS CMA

## 2022-02-28 ENCOUNTER — OFFICE VISIT (OUTPATIENT)
Dept: FAMILY MEDICINE | Facility: CLINIC | Age: 72
End: 2022-02-28
Payer: COMMERCIAL

## 2022-02-28 VITALS
DIASTOLIC BLOOD PRESSURE: 64 MMHG | BODY MASS INDEX: 26.37 KG/M2 | HEART RATE: 81 BPM | OXYGEN SATURATION: 98 % | WEIGHT: 168 LBS | RESPIRATION RATE: 22 BRPM | HEIGHT: 67 IN | TEMPERATURE: 97.9 F | SYSTOLIC BLOOD PRESSURE: 103 MMHG

## 2022-02-28 DIAGNOSIS — I42.9 IDIOPATHIC CARDIOMYOPATHY (H): ICD-10-CM

## 2022-02-28 DIAGNOSIS — Z12.11 SCREEN FOR COLON CANCER: ICD-10-CM

## 2022-02-28 DIAGNOSIS — I10 BENIGN ESSENTIAL HYPERTENSION: ICD-10-CM

## 2022-02-28 DIAGNOSIS — Z13.220 SCREENING FOR HYPERLIPIDEMIA: ICD-10-CM

## 2022-02-28 DIAGNOSIS — Z23 HIGH PRIORITY FOR 2019-NCOV VACCINE: ICD-10-CM

## 2022-02-28 DIAGNOSIS — Z12.5 SCREENING FOR PROSTATE CANCER: ICD-10-CM

## 2022-02-28 DIAGNOSIS — I50.9 CONGESTIVE HEART FAILURE, UNSPECIFIED HF CHRONICITY, UNSPECIFIED HEART FAILURE TYPE (H): ICD-10-CM

## 2022-02-28 DIAGNOSIS — N18.5 CKD (CHRONIC KIDNEY DISEASE) STAGE 5, GFR LESS THAN 15 ML/MIN (H): ICD-10-CM

## 2022-02-28 DIAGNOSIS — E78.2 MIXED HYPERLIPIDEMIA: Primary | ICD-10-CM

## 2022-02-28 LAB
ERYTHROCYTE [DISTWIDTH] IN BLOOD BY AUTOMATED COUNT: 14.3 % (ref 10–15)
HCT VFR BLD AUTO: 33.4 % (ref 40–53)
HGB BLD-MCNC: 10.1 G/DL (ref 13.3–17.7)
MCH RBC QN AUTO: 31.5 PG (ref 26.5–33)
MCHC RBC AUTO-ENTMCNC: 30.2 G/DL (ref 31.5–36.5)
MCV RBC AUTO: 104 FL (ref 78–100)
PLATELET # BLD AUTO: 258 10E3/UL (ref 150–450)
PTH-INTACT SERPL-MCNC: 126 PG/ML (ref 18–80)
RBC # BLD AUTO: 3.21 10E6/UL (ref 4.4–5.9)
WBC # BLD AUTO: 5.3 10E3/UL (ref 4–11)

## 2022-02-28 PROCEDURE — 99214 OFFICE O/P EST MOD 30 MIN: CPT | Performed by: FAMILY MEDICINE

## 2022-02-28 PROCEDURE — 0054A COVID-19,PF,PFIZER (12+ YRS): CPT | Performed by: FAMILY MEDICINE

## 2022-02-28 PROCEDURE — 83970 ASSAY OF PARATHORMONE: CPT | Performed by: FAMILY MEDICINE

## 2022-02-28 PROCEDURE — 84460 ALANINE AMINO (ALT) (SGPT): CPT | Performed by: FAMILY MEDICINE

## 2022-02-28 PROCEDURE — 80061 LIPID PANEL: CPT | Performed by: FAMILY MEDICINE

## 2022-02-28 PROCEDURE — 80048 BASIC METABOLIC PNL TOTAL CA: CPT | Performed by: FAMILY MEDICINE

## 2022-02-28 PROCEDURE — 85027 COMPLETE CBC AUTOMATED: CPT | Performed by: FAMILY MEDICINE

## 2022-02-28 PROCEDURE — 91305 COVID-19,PF,PFIZER (12+ YRS): CPT | Performed by: FAMILY MEDICINE

## 2022-02-28 PROCEDURE — G0103 PSA SCREENING: HCPCS | Performed by: FAMILY MEDICINE

## 2022-02-28 PROCEDURE — 83735 ASSAY OF MAGNESIUM: CPT | Performed by: FAMILY MEDICINE

## 2022-02-28 PROCEDURE — 84100 ASSAY OF PHOSPHORUS: CPT | Performed by: FAMILY MEDICINE

## 2022-02-28 PROCEDURE — 36415 COLL VENOUS BLD VENIPUNCTURE: CPT | Performed by: FAMILY MEDICINE

## 2022-02-28 ASSESSMENT — PAIN SCALES - GENERAL: PAINLEVEL: NO PAIN (0)

## 2022-02-28 NOTE — LETTER
March 9, 2022      Bry Crabtree  [address] [address]        Dear ,    We are writing to inform you of your test results.    Your stool tests to rule out colon cancer is normal.       Resulted Orders   Parathyroid Hormone Intact   Result Value Ref Range    Parathyroid Hormone Intact 126 (H) 18 - 80 pg/mL   Phosphorus   Result Value Ref Range    Phosphorus 2.9 2.5 - 4.5 mg/dL   Fecal colorectal cancer screen FIT - Future (S+30)   Result Value Ref Range    Occult Blood Screen FIT Negative Negative   ALT   Result Value Ref Range    ALT 11 0 - 70 U/L   Lipid panel reflex to direct LDL Fasting   Result Value Ref Range    Cholesterol 123 <200 mg/dL    Triglycerides 117 <150 mg/dL    Direct Measure HDL 41 >=40 mg/dL    LDL Cholesterol Calculated 59 <=100 mg/dL    Non HDL Cholesterol 82 <130 mg/dL    Patient Fasting > 8hrs? Yes     Narrative    Cholesterol  Desirable:  <200 mg/dL    Triglycerides  Normal:  Less than 150 mg/dL  Borderline High:  150-199 mg/dL  High:  200-499 mg/dL  Very High:  Greater than or equal to 500 mg/dL    Direct Measure HDL  Female:  Greater than or equal to 50 mg/dL   Male:  Greater than or equal to 40 mg/dL    LDL Cholesterol  Desirable:  <100mg/dL  Above Desirable:  100-129 mg/dL   Borderline High:  130-159 mg/dL   High:  160-189 mg/dL   Very High:  >= 190 mg/dL    Non HDL Cholesterol  Desirable:  130 mg/dL  Above Desirable:  130-159 mg/dL  Borderline High:  160-189 mg/dL  High:  190-219 mg/dL  Very High:  Greater than or equal to 220 mg/dL   CBC with Platelets   Result Value Ref Range    WBC Count 5.3 4.0 - 11.0 10e3/uL    RBC Count 3.21 (L) 4.40 - 5.90 10e6/uL    Hemoglobin 10.1 (L) 13.3 - 17.7 g/dL    Hematocrit 33.4 (L) 40.0 - 53.0 %     (H) 78 - 100 fL    MCH 31.5 26.5 - 33.0 pg    MCHC 30.2 (L) 31.5 - 36.5 g/dL    RDW 14.3 10.0 - 15.0 %    Platelet Count 258 150 - 450 10e3/uL    Narrative    Results confirmed by repeat test.   BASIC METABOLIC  PANEL   Result Value Ref Range    Sodium 142 133 - 144 mmol/L    Potassium 5.2 3.4 - 5.3 mmol/L    Chloride 114 (H) 94 - 109 mmol/L    Carbon Dioxide (CO2) 25 20 - 32 mmol/L    Anion Gap 3 3 - 14 mmol/L    Urea Nitrogen 19 7 - 30 mg/dL    Creatinine 2.94 (H) 0.66 - 1.25 mg/dL    Calcium 8.3 (L) 8.5 - 10.1 mg/dL    Glucose 107 (H) 70 - 99 mg/dL    GFR Estimate 22 (L) >60 mL/min/1.73m2      Comment:      Effective December 21, 2021 eGFRcr in adults is calculated using the 2021 CKD-EPI creatinine equation which includes age and gender (Filiberto et al., NEJM, DOI: 10.1056/TNZGum6982795)   Magnesium   Result Value Ref Range    Magnesium 2.4 (H) 1.6 - 2.3 mg/dL   PSA, screen   Result Value Ref Range    Prostate Specific Antigen Screen 2.06 0.00 - 4.00 ug/L       If you have any questions or concerns, please call the clinic at the number listed above.       Sincerely,      Xander Lee MD

## 2022-02-28 NOTE — LETTER
March 2, 2022      Bry Crabtree  [address] [facility] [id number]        Dear ,    We are writing to inform you of your test results.    You maybe able to check the lab results via [facility], it showed your lab results including cholesterol indexes and prostate specific antigen/phosphorus and magnesium/electrolyte balance and glucose/liver function were stable at your baseline.     Your calcium level and blood cell counts are suppressed, and your parathyroid hormone level is elevated due to suppressed renal function. Please continue work on hydration/high protein diet with calcium enriched food and supplements.       Resulted Orders   Parathyroid Hormone Intact   Result Value Ref Range    Parathyroid Hormone Intact 126 (H) 18 - 80 pg/mL   Phosphorus   Result Value Ref Range    Phosphorus 2.9 2.5 - 4.5 mg/dL   ALT   Result Value Ref Range    ALT 11 0 - 70 U/L   Lipid panel reflex to direct LDL Fasting   Result Value Ref Range    Cholesterol 123 <200 mg/dL    Triglycerides 117 <150 mg/dL    Direct Measure HDL 41 >=40 mg/dL    LDL Cholesterol Calculated 59 <=100 mg/dL    Non HDL Cholesterol 82 <130 mg/dL    Patient Fasting > 8hrs? Yes     Narrative    Cholesterol  Desirable:  <200 mg/dL    Triglycerides  Normal:  Less than 150 mg/dL  Borderline High:  150-199 mg/dL  High:  200-499 mg/dL  Very High:  Greater than or equal to 500 mg/dL    Direct Measure HDL  Female:  Greater than or equal to 50 mg/dL   Male:  Greater than or equal to 40 mg/dL    LDL Cholesterol  Desirable:  <100mg/dL  Above Desirable:  100-129 mg/dL   Borderline High:  130-159 mg/dL   High:  160-189 mg/dL   Very High:  >= 190 mg/dL    Non HDL Cholesterol  Desirable:  130 mg/dL  Above Desirable:  130-159 mg/dL  Borderline High:  160-189 mg/dL  High:  190-219 mg/dL  Very High:  Greater than or equal to 220 mg/dL   CBC with Platelets   Result Value Ref Range    WBC Count 5.3 4.0 - 11.0 10e3/uL    RBC Count 3.21 (L) 4.40 - 5.90  10e6/uL    Hemoglobin 10.1 (L) 13.3 - 17.7 g/dL    Hematocrit 33.4 (L) 40.0 - 53.0 %     (H) 78 - 100 fL    MCH 31.5 26.5 - 33.0 pg    MCHC 30.2 (L) 31.5 - 36.5 g/dL    RDW 14.3 10.0 - 15.0 %    Platelet Count 258 150 - 450 10e3/uL    Narrative    Results confirmed by repeat test.   BASIC METABOLIC PANEL   Result Value Ref Range    Sodium 142 133 - 144 mmol/L    Potassium 5.2 3.4 - 5.3 mmol/L    Chloride 114 (H) 94 - 109 mmol/L    Carbon Dioxide (CO2) 25 20 - 32 mmol/L    Anion Gap 3 3 - 14 mmol/L    Urea Nitrogen 19 7 - 30 mg/dL    Creatinine 2.94 (H) 0.66 - 1.25 mg/dL    Calcium 8.3 (L) 8.5 - 10.1 mg/dL    Glucose 107 (H) 70 - 99 mg/dL    GFR Estimate 22 (L) >60 mL/min/1.73m2      Comment:      Effective December 21, 2021 eGFRcr in adults is calculated using the 2021 CKD-EPI creatinine equation which includes age and gender (Filiberto et al., NEJM, DOI: 10.1056/TBMYpf6382052)   Magnesium   Result Value Ref Range    Magnesium 2.4 (H) 1.6 - 2.3 mg/dL   PSA, screen   Result Value Ref Range    Prostate Specific Antigen Screen 2.06 0.00 - 4.00 ug/L       If you have any questions or concerns, please call the clinic at the number listed above.       Sincerely,      Xander Lee MD

## 2022-02-28 NOTE — PROGRESS NOTES
"  Assessment & Plan     Screen for colon cancer    - Fecal colorectal cancer screen FIT - Future (S+30); Future    Screening for hyperlipidemia    - Lipid panel reflex to direct LDL Fasting; Future    Mixed hyperlipidemia  Has been stable, will review the lab and update pt   - Lipid panel reflex to direct LDL Fasting; Future    Benign essential hypertension  Stable,  Continue monitoring   - ALT; Future  - Lipid panel reflex to direct LDL Fasting; Future  - BASIC METABOLIC PANEL; Future    Idiopathic cardiomyopathy (H)  Stable   - ALT; Future  - Lipid panel reflex to direct LDL Fasting; Future  - BASIC METABOLIC PANEL; Future    CKD (chronic kidney disease) stage 5, GFR less than 15 ml/min (H)  Has no sign concerning clinical deterioration   Will continue monitoring   - Parathyroid Hormone Intact; Future  - Phosphorus; Future  - Albumin Random Urine Quantitative with Creat Ratio; Future  - Magnesium; Future    Congestive heart failure, unspecified HF chronicity, unspecified heart failure type (H)  Stable, continue monitoring   - ALT; Future  - Lipid panel reflex to direct LDL Fasting; Future  - CBC with Platelets; Future  - BASIC METABOLIC PANEL; Future  - Albumin Random Urine Quantitative with Creat Ratio; Future  - ASPIRIN NOT PRESCRIBED (INTENTIONAL); Please choose reason not prescribed from choices below.    Screening for prostate cancer    - PSA, screen; Future           BMI:   Estimated body mass index is 26.31 kg/m  as calculated from the following:    Height as of this encounter: 1.702 m (5' 7\").    Weight as of this encounter: 76.2 kg (168 lb).       FUTURE APPOINTMENTS:       - Follow-up visit in 6 months for CPE    Return in about 6 months (around 8/28/2022) for Physical Exam.    Xander Lee MD  [facility] is a 71 year old who presents for the following health issues     History of Present Illness       CKD: He uses over the counter pain medication, " "including IBU, a few times a month.    Heart Failure:  He presents for follow up of heart failure. He is not experiencing shortness of breath at night, with rest or with activity He is not experiencing any lower extremity edema.   He denies orthopenea and is not coughing at night. Patient is checking weight daily. He has recently had a None. He has no side effects from medications.  He has had no other medical visits for heart failure since the last visit.    Hyperlipidemia:  He presents for follow up of hyperlipidemia.  He is taking medication to lower cholesterol. He is not having myalgia or other side effects to statin medications.    Hypertension: He presents for follow up of hypertension.  He does check blood pressure  regularly outside of the clinic. Outpatient blood pressures have not been over 140/90. He follows a low salt diet.     Migraines:   Since the patient's last clinic visit, headaches are: improved  The patient is getting headaches:  Does not have one currently  He is able to do normal daily activities when he has a migraine.  The patient is taking the following rescue/relief medications:  Ibuprofen (Advil, Motrin)   Patient states \"I get total relief\" from the rescue/relief medications.   The patient is taking the following medications to prevent migraines:  No medications to prevent migraines  In the past 4 weeks, the patient has gone to an Urgent Care or Emergency Room 0 times times due to headaches.  Reason for visit:  Medication follow up  Symptom onset:  More than a month  Symptom intensity:  Mild  Symptom progression:  Staying the same  Had these symptoms before:  No  What makes it worse:  No  What makes it better:  No    He eats 0-1 servings of fruits and vegetables daily.He consumes 0 sweetened beverage(s) daily.He exercises with enough effort to increase his heart rate 60 or more minutes per day.  He exercises with enough effort to increase his heart rate 7 days per week.   He is taking " "medications regularly.      Review of Systems   Constitutional, HEENT, cardiovascular, pulmonary, gi and gu systems are negative, except as otherwise noted.      Objective    /64   Pulse 81   Temp 97.9  F (36.6  C) (Tympanic)   Resp 22   Ht 1.702 m (5' 7\")   Wt 76.2 kg (168 lb)   SpO2 98%   BMI 26.31 kg/m    Body mass index is 26.31 kg/m .  Physical Exam   GENERAL: healthy, alert and no distress  EYES: Eyes grossly normal to inspection, PERRL and conjunctivae and sclerae normal  HENT: ear canals and TM's normal, nose and mouth without ulcers or lesions  NECK: no adenopathy, no asymmetry, masses, or scars and thyroid normal to palpation  RESP: lungs clear to auscultation - no rales, rhonchi or wheezes  CV: regular rate and rhythm, normal S1 S2, no S3 or S4, no murmur, click or rub, no peripheral edema and peripheral pulses strong  ABDOMEN: soft, nontender, no hepatosplenomegaly, no masses and bowel sounds normal  MS: no gross musculoskeletal defects noted, no edema  SKIN: no suspicious lesions or rashes  NEURO: Normal strength and tone, mentation intact and speech normal                "

## 2022-03-01 LAB
ALT SERPL W P-5'-P-CCNC: 11 U/L (ref 0–70)
ANION GAP SERPL CALCULATED.3IONS-SCNC: 3 MMOL/L (ref 3–14)
BUN SERPL-MCNC: 19 MG/DL (ref 7–30)
CALCIUM SERPL-MCNC: 8.3 MG/DL (ref 8.5–10.1)
CHLORIDE BLD-SCNC: 114 MMOL/L (ref 94–109)
CHOLEST SERPL-MCNC: 123 MG/DL
CO2 SERPL-SCNC: 25 MMOL/L (ref 20–32)
CREAT SERPL-MCNC: 2.94 MG/DL (ref 0.66–1.25)
FASTING STATUS PATIENT QL REPORTED: YES
GFR SERPL CREATININE-BSD FRML MDRD: 22 ML/MIN/1.73M2
GLUCOSE BLD-MCNC: 107 MG/DL (ref 70–99)
HDLC SERPL-MCNC: 41 MG/DL
LDLC SERPL CALC-MCNC: 59 MG/DL
MAGNESIUM SERPL-MCNC: 2.4 MG/DL (ref 1.6–2.3)
NONHDLC SERPL-MCNC: 82 MG/DL
PHOSPHATE SERPL-MCNC: 2.9 MG/DL (ref 2.5–4.5)
POTASSIUM BLD-SCNC: 5.2 MMOL/L (ref 3.4–5.3)
PSA SERPL-MCNC: 2.06 UG/L (ref 0–4)
SODIUM SERPL-SCNC: 142 MMOL/L (ref 133–144)
TRIGL SERPL-MCNC: 117 MG/DL

## 2022-03-05 PROCEDURE — 82274 ASSAY TEST FOR BLOOD FECAL: CPT | Performed by: FAMILY MEDICINE

## 2022-03-08 LAB — HEMOCCULT STL QL IA: NEGATIVE

## 2022-05-25 DIAGNOSIS — E78.2 MIXED HYPERLIPIDEMIA: ICD-10-CM

## 2022-05-25 DIAGNOSIS — R30.0 DYSURIA: ICD-10-CM

## 2022-05-25 DIAGNOSIS — R10.9 FLANK PAIN: ICD-10-CM

## 2022-05-25 DIAGNOSIS — I10 BENIGN ESSENTIAL HYPERTENSION: ICD-10-CM

## 2022-05-25 DIAGNOSIS — M54.9 ACUTE BILATERAL BACK PAIN, UNSPECIFIED BACK LOCATION: ICD-10-CM

## 2022-05-27 RX ORDER — LISINOPRIL 5 MG/1
TABLET ORAL
Qty: 90 TABLET | Refills: 0 | Status: SHIPPED | OUTPATIENT
Start: 2022-05-27 | End: 2022-08-23

## 2022-05-27 RX ORDER — FUROSEMIDE 20 MG
TABLET ORAL
Qty: 90 TABLET | Refills: 0 | Status: SHIPPED | OUTPATIENT
Start: 2022-05-27 | End: 2022-08-23

## 2022-05-27 RX ORDER — TAMSULOSIN HYDROCHLORIDE 0.4 MG/1
CAPSULE ORAL
Qty: 90 CAPSULE | Refills: 2 | Status: SHIPPED | OUTPATIENT
Start: 2022-05-27 | End: 2023-03-06

## 2022-05-27 RX ORDER — CARVEDILOL 6.25 MG/1
TABLET ORAL
Qty: 180 TABLET | Refills: 2 | Status: SHIPPED | OUTPATIENT
Start: 2022-05-27 | End: 2023-03-06

## 2022-05-27 RX ORDER — ROSUVASTATIN CALCIUM 20 MG/1
TABLET, COATED ORAL
Qty: 90 TABLET | Refills: 2 | Status: SHIPPED | OUTPATIENT
Start: 2022-05-27 | End: 2023-03-06

## 2022-05-27 NOTE — TELEPHONE ENCOUNTER
Prescription approved per [facility] Refill Protocol.  Heide Glover RN  [facility]

## 2022-05-27 NOTE — TELEPHONE ENCOUNTER
Routing refill request to provider for review/approval because:  Labs out of range:    Creatinine   Date Value Ref Range Status   02/28/2022 2.94 (H) 0.66 - 1.25 mg/dL Final   02/03/2021 4.98 (H) 0.66 - 1.25 mg/dL Final      Heide Glover RN  [facility]

## 2022-08-19 DIAGNOSIS — I10 BENIGN ESSENTIAL HYPERTENSION: ICD-10-CM

## 2022-08-21 ENCOUNTER — NURSE TRIAGE (OUTPATIENT)
Dept: NURSING | Facility: CLINIC | Age: 72
End: 2022-08-21

## 2022-08-22 NOTE — TELEPHONE ENCOUNTER
"Patient had a knee replacement 5 years ago.  Now patient has developed 2 bumps on that knee.   The bumps appear when patient bends knee.  Patient states he cannot kneel on it or he has severe pain.  This has been occurring a few months now.    Patient is currently able to walk, no swelling, no fever.    Will route message to ortho clinic to follow up.    Mariah Mckeon RN   08/21/22 10:11 PM  [facility] Nurse Advisor    Reason for Disposition    [1] MODERATE pain (e.g., interferes with normal activities, limping) AND [2] present > 3 days    Additional Information    Negative: Followed a knee injury    Negative: Leg pain is main symptom    Negative: Knee swelling is main symptom    Negative: Sounds like a life-threatening emergency to the triager    Negative: [1] Swollen joint AND [2] fever    Negative: [1] Red area or streak AND [2] fever    Negative: Patient sounds very sick or weak to the triager    Negative: [1] SEVERE pain (e.g., excruciating, unable to walk) AND [2] not improved after 2 hours of pain medicine    Negative: [1] Can't move swollen joint at all AND [2] no fever    Negative: [1] Thigh or calf pain AND [2] only 1 side AND [3] present > 1 hour    Negative: [1] Thigh, calf, or ankle swelling AND [2] only 1 side    Negative: [1] Looks infected (spreading redness, pus) AND [2] large red area (> 2 in. or 5 cm)    Negative: [1] Very swollen joint AND [2] no fever    Negative: Blistering rash in area of pain (i.e., dermatomal distribution or \"band\" or \"stripe\")    Negative: Looks like a boil, infected sore, or deep ulcer    Negative: [1] Redness of the skin AND [2] no fever    Protocols used: KNEE PAIN-A-AH      "

## 2022-08-23 RX ORDER — FUROSEMIDE 20 MG
TABLET ORAL
Qty: 90 TABLET | Refills: 1 | Status: SHIPPED | OUTPATIENT
Start: 2022-08-23 | End: 2023-03-06

## 2022-08-23 RX ORDER — LISINOPRIL 5 MG/1
TABLET ORAL
Qty: 90 TABLET | Refills: 1 | Status: SHIPPED | OUTPATIENT
Start: 2022-08-23 | End: 2023-03-06

## 2022-08-23 NOTE — TELEPHONE ENCOUNTER
Routing refill request to provider for review/approval because:  Labs out of range:  Creatinine  Creatinine   Date Value Ref Range Status   02/28/2022 2.94 (H) 0.66 - 1.25 mg/dL Final   02/03/2021 4.98 (H) 0.66 - 1.25 mg/dL Final     Alisson Hendrix RN

## 2022-08-24 ENCOUNTER — OFFICE VISIT (OUTPATIENT)
Dept: FAMILY MEDICINE | Facility: CLINIC | Age: 72
End: 2022-08-24
Payer: COMMERCIAL

## 2022-08-24 VITALS
TEMPERATURE: 96.9 F | HEART RATE: 70 BPM | SYSTOLIC BLOOD PRESSURE: 113 MMHG | BODY MASS INDEX: 26.47 KG/M2 | OXYGEN SATURATION: 95 % | DIASTOLIC BLOOD PRESSURE: 71 MMHG | WEIGHT: 169 LBS | RESPIRATION RATE: 15 BRPM

## 2022-08-24 DIAGNOSIS — M17.11 PRIMARY OSTEOARTHRITIS OF RIGHT KNEE: Primary | ICD-10-CM

## 2022-08-24 DIAGNOSIS — I10 BENIGN ESSENTIAL HYPERTENSION: ICD-10-CM

## 2022-08-24 PROCEDURE — 99214 OFFICE O/P EST MOD 30 MIN: CPT | Performed by: FAMILY MEDICINE

## 2022-08-24 ASSESSMENT — PAIN SCALES - GENERAL: PAINLEVEL: NO PAIN (0)

## 2022-08-24 ASSESSMENT — PATIENT HEALTH QUESTIONNAIRE - PHQ9
10. IF YOU CHECKED OFF ANY PROBLEMS, HOW DIFFICULT HAVE THESE PROBLEMS MADE IT FOR YOU TO DO YOUR WORK, TAKE CARE OF THINGS AT HOME, OR GET ALONG WITH OTHER PEOPLE: NOT DIFFICULT AT ALL
SUM OF ALL RESPONSES TO PHQ QUESTIONS 1-9: 0
SUM OF ALL RESPONSES TO PHQ QUESTIONS 1-9: 0

## 2022-08-24 NOTE — PROGRESS NOTES
"Pt needs some prescription approved to refill but not sure what they are.        Assessment & Plan     Primary osteoarthritis of right knee  Worsening with bone spurs on patella and MCL  Will have him to try home PT and wearing knee brace as needed   - Knee Supplies Order for DME - ONLY FOR DME    Benign essential hypertension  Stable  Keep monitoring            BMI:   Estimated body mass index is 26.47 kg/m  as calculated from the following:    Height as of 2/28/22: 1.702 m (5' 7\").    Weight as of this encounter: 76.7 kg (169 lb).       FUTURE APPOINTMENTS:       - Follow-up visit in 6 months     No follow-ups on file.    Xander Lee MD  [facility] SHIRA Londono is a 72 year old presenting for the following health issues:  Musculoskeletal Problem (Knee problem)      Musculoskeletal Problem    History of Present Illness       Reason for visit:  R knee    He eats 2-3 servings of fruits and vegetables daily.He consumes 1 sweetened beverage(s) daily.He exercises with enough effort to increase his heart rate 10 to 19 minutes per day.  He exercises with enough effort to increase his heart rate 3 or less days per week.   He is taking medications regularly.    Today's PHQ-9         PHQ-9 Total Score: 0    PHQ-9 Q9 Thoughts of better off dead/self-harm past 2 weeks :   Not at all    How difficult have these problems made it for you to do your work, take care of things at home, or get along with other people: Not difficult at all       Review of Systems   Constitutional, HEENT, cardiovascular, pulmonary, gi and gu systems are negative, except as otherwise noted.      Objective    /71   Pulse 70   Temp 96.9  F (36.1  C) (Tympanic)   Resp 15   Wt 76.7 kg (169 lb)   SpO2 95%   BMI 26.47 kg/m    Body mass index is 26.47 kg/m .  Physical Exam   GENERAL: healthy, alert and no distress  NECK: no adenopathy, no asymmetry, masses, or scars and thyroid normal to palpation  RESP: lungs " clear to auscultation - no rales, rhonchi or wheezes  CV: regular rate and rhythm, normal S1 S2, no S3 or S4, no murmur, click or rub, no peripheral edema and peripheral pulses strong  ABDOMEN: soft, nontender, no hepatosplenomegaly, no masses and bowel sounds normal  MS: no gross musculoskeletal defects noted, no edema                    .  ..

## 2022-12-02 ENCOUNTER — NURSE TRIAGE (OUTPATIENT)
Dept: NURSING | Facility: CLINIC | Age: 72
End: 2022-12-02

## 2022-12-03 NOTE — TELEPHONE ENCOUNTER
Nurse Triage SBAR    Is this a 2nd Level Triage? NO    Situation: Bry called stating he slipped this evening around 2030 while taking his dog out and thinks he dislocated his shoulder.    Background: Bry states he has dislocated his shoulder in the past and has had in put back into place by his chiropractor.     Assessment: Bry denies hitting his head when falling. He states he was taking his dog out in the dark, slipped and fell on his right arm. He states it is only painful when he tries to move it above his elbow. He did take an Advil p/t calling.    Protocol Recommended Disposition:   Go to ED Now    Recommendation: Dispo to go to ER. Bry does not want to go to the ER tonight. He states he will call his chiropractor tomorrow.    Roseline Jenkins RN  [facility] Nurse Advisor   12/2/2022  9:37 PM    Reason for Disposition    Looks like a dislocated joint    Additional Information    Negative: Serious injury with multiple fractures (broken bones)    Negative: [1] Major bleeding (e.g., actively dripping or spurting) AND [2] can't be stopped    Negative: Bullet wound, stabbed by knife, or other serious penetrating wound    Negative: Sounds like a life-threatening emergency to the triager    Protocols used: SHOULDER INJURY-A-AH

## 2023-04-14 DIAGNOSIS — I10 BENIGN ESSENTIAL HYPERTENSION: ICD-10-CM

## 2023-04-14 DIAGNOSIS — M54.9 ACUTE BILATERAL BACK PAIN, UNSPECIFIED BACK LOCATION: ICD-10-CM

## 2023-04-14 DIAGNOSIS — R10.9 FLANK PAIN: ICD-10-CM

## 2023-04-14 DIAGNOSIS — R30.0 DYSURIA: ICD-10-CM

## 2023-04-17 RX ORDER — CARVEDILOL 6.25 MG/1
TABLET ORAL
Qty: 180 TABLET | Refills: 0 | Status: SHIPPED | OUTPATIENT
Start: 2023-04-17 | End: 2023-05-19

## 2023-04-17 RX ORDER — TAMSULOSIN HYDROCHLORIDE 0.4 MG/1
CAPSULE ORAL
Qty: 90 CAPSULE | Refills: 0 | Status: SHIPPED | OUTPATIENT
Start: 2023-04-17 | End: 2023-05-19

## 2023-05-19 ENCOUNTER — OFFICE VISIT (OUTPATIENT)
Dept: FAMILY MEDICINE | Facility: CLINIC | Age: 73
End: 2023-05-19
Payer: COMMERCIAL

## 2023-05-19 VITALS
WEIGHT: 166.2 LBS | RESPIRATION RATE: 16 BRPM | SYSTOLIC BLOOD PRESSURE: 94 MMHG | HEART RATE: 69 BPM | DIASTOLIC BLOOD PRESSURE: 52 MMHG | TEMPERATURE: 96.8 F | OXYGEN SATURATION: 99 % | HEIGHT: 66 IN | BODY MASS INDEX: 26.71 KG/M2

## 2023-05-19 DIAGNOSIS — E03.9 HYPOTHYROIDISM, UNSPECIFIED TYPE: ICD-10-CM

## 2023-05-19 DIAGNOSIS — I10 BENIGN ESSENTIAL HYPERTENSION: ICD-10-CM

## 2023-05-19 DIAGNOSIS — I25.10 CORONARY ARTERY DISEASE DUE TO CALCIFIED CORONARY LESION: ICD-10-CM

## 2023-05-19 DIAGNOSIS — I25.84 CORONARY ARTERY DISEASE DUE TO CALCIFIED CORONARY LESION: ICD-10-CM

## 2023-05-19 DIAGNOSIS — I50.9 CONGESTIVE HEART FAILURE, UNSPECIFIED HF CHRONICITY, UNSPECIFIED HEART FAILURE TYPE (H): ICD-10-CM

## 2023-05-19 DIAGNOSIS — Z12.11 SCREEN FOR COLON CANCER: ICD-10-CM

## 2023-05-19 DIAGNOSIS — I82.4Z1 ACUTE VENOUS EMBOLISM AND THROMBOSIS OF DEEP VESSELS OF DISTAL LOWER EXTREMITY, RIGHT (H): ICD-10-CM

## 2023-05-19 DIAGNOSIS — E78.2 MIXED HYPERLIPIDEMIA: ICD-10-CM

## 2023-05-19 DIAGNOSIS — Z00.01 ENCOUNTER FOR GENERAL ADULT MEDICAL EXAMINATION WITH ABNORMAL FINDINGS: ICD-10-CM

## 2023-05-19 DIAGNOSIS — Z12.5 SCREENING FOR PROSTATE CANCER: ICD-10-CM

## 2023-05-19 DIAGNOSIS — Z23 HIGH PRIORITY FOR 2019-NCOV VACCINE: Primary | ICD-10-CM

## 2023-05-19 DIAGNOSIS — K22.10 ULCER OF ESOPHAGUS WITHOUT BLEEDING: ICD-10-CM

## 2023-05-19 DIAGNOSIS — N18.4 CKD (CHRONIC KIDNEY DISEASE) STAGE 4, GFR 15-29 ML/MIN (H): ICD-10-CM

## 2023-05-19 DIAGNOSIS — R30.0 DYSURIA: ICD-10-CM

## 2023-05-19 DIAGNOSIS — Z96.651 S/P TKR (TOTAL KNEE REPLACEMENT) NOT USING CEMENT, RIGHT: ICD-10-CM

## 2023-05-19 DIAGNOSIS — M54.9 ACUTE BILATERAL BACK PAIN, UNSPECIFIED BACK LOCATION: ICD-10-CM

## 2023-05-19 DIAGNOSIS — Z23 NEED FOR SHINGLES VACCINE: ICD-10-CM

## 2023-05-19 DIAGNOSIS — N18.5 CKD (CHRONIC KIDNEY DISEASE) STAGE 5, GFR LESS THAN 15 ML/MIN (H): ICD-10-CM

## 2023-05-19 DIAGNOSIS — R10.9 FLANK PAIN: ICD-10-CM

## 2023-05-19 LAB
ALT SERPL W P-5'-P-CCNC: 7 U/L (ref 10–50)
ANION GAP SERPL CALCULATED.3IONS-SCNC: 14 MMOL/L (ref 7–15)
BUN SERPL-MCNC: 34.4 MG/DL (ref 8–23)
CALCIUM SERPL-MCNC: 9.3 MG/DL (ref 8.8–10.2)
CHLORIDE SERPL-SCNC: 110 MMOL/L (ref 98–107)
CHOLEST SERPL-MCNC: 144 MG/DL
CREAT SERPL-MCNC: 3.59 MG/DL (ref 0.67–1.17)
DEPRECATED HCO3 PLAS-SCNC: 20 MMOL/L (ref 22–29)
ERYTHROCYTE [DISTWIDTH] IN BLOOD BY AUTOMATED COUNT: 14.1 % (ref 10–15)
GFR SERPL CREATININE-BSD FRML MDRD: 17 ML/MIN/1.73M2
GLUCOSE SERPL-MCNC: 100 MG/DL (ref 70–99)
HCT VFR BLD AUTO: 39.7 % (ref 40–53)
HDLC SERPL-MCNC: 37 MG/DL
HGB BLD-MCNC: 12.4 G/DL (ref 13.3–17.7)
LDLC SERPL CALC-MCNC: 87 MG/DL
MCH RBC QN AUTO: 30 PG (ref 26.5–33)
MCHC RBC AUTO-ENTMCNC: 31.2 G/DL (ref 31.5–36.5)
MCV RBC AUTO: 96 FL (ref 78–100)
NONHDLC SERPL-MCNC: 107 MG/DL
PLATELET # BLD AUTO: 291 10E3/UL (ref 150–450)
POTASSIUM SERPL-SCNC: 3.9 MMOL/L (ref 3.4–5.3)
PSA SERPL DL<=0.01 NG/ML-MCNC: 2.71 NG/ML (ref 0–6.5)
RBC # BLD AUTO: 4.13 10E6/UL (ref 4.4–5.9)
SODIUM SERPL-SCNC: 144 MMOL/L (ref 136–145)
TRIGL SERPL-MCNC: 102 MG/DL
TSH SERPL DL<=0.005 MIU/L-ACNC: 3.5 UIU/ML (ref 0.3–4.2)
WBC # BLD AUTO: 7 10E3/UL (ref 4–11)

## 2023-05-19 PROCEDURE — 80048 BASIC METABOLIC PNL TOTAL CA: CPT | Performed by: FAMILY MEDICINE

## 2023-05-19 PROCEDURE — 85027 COMPLETE CBC AUTOMATED: CPT | Performed by: FAMILY MEDICINE

## 2023-05-19 PROCEDURE — 0121A COVID-19 BIVALENT 12+ (PFIZER): CPT | Performed by: FAMILY MEDICINE

## 2023-05-19 PROCEDURE — 80061 LIPID PANEL: CPT | Performed by: FAMILY MEDICINE

## 2023-05-19 PROCEDURE — 36415 COLL VENOUS BLD VENIPUNCTURE: CPT | Performed by: FAMILY MEDICINE

## 2023-05-19 PROCEDURE — G0439 PPPS, SUBSEQ VISIT: HCPCS | Performed by: FAMILY MEDICINE

## 2023-05-19 PROCEDURE — G0103 PSA SCREENING: HCPCS | Performed by: FAMILY MEDICINE

## 2023-05-19 PROCEDURE — 84460 ALANINE AMINO (ALT) (SGPT): CPT | Performed by: FAMILY MEDICINE

## 2023-05-19 PROCEDURE — 84443 ASSAY THYROID STIM HORMONE: CPT | Performed by: FAMILY MEDICINE

## 2023-05-19 PROCEDURE — 91312 COVID-19 BIVALENT 12+ (PFIZER): CPT | Performed by: FAMILY MEDICINE

## 2023-05-19 RX ORDER — FERROUS GLUCONATE 324(38)MG
324 TABLET ORAL
Qty: 90 TABLET | Refills: 3 | Status: SHIPPED | OUTPATIENT
Start: 2023-05-19

## 2023-05-19 RX ORDER — TAMSULOSIN HYDROCHLORIDE 0.4 MG/1
0.4 CAPSULE ORAL DAILY
Qty: 90 CAPSULE | Refills: 3 | Status: SHIPPED | OUTPATIENT
Start: 2023-05-19 | End: 2024-07-09

## 2023-05-19 RX ORDER — FUROSEMIDE 20 MG
TABLET ORAL
Qty: 90 TABLET | Refills: 3 | Status: SHIPPED | OUTPATIENT
Start: 2023-05-19 | End: 2024-03-28

## 2023-05-19 RX ORDER — ROSUVASTATIN CALCIUM 20 MG/1
20 TABLET, COATED ORAL DAILY
Qty: 90 TABLET | Refills: 3 | Status: SHIPPED | OUTPATIENT
Start: 2023-05-19 | End: 2024-07-09

## 2023-05-19 RX ORDER — CARVEDILOL 6.25 MG/1
6.25 TABLET ORAL 2 TIMES DAILY WITH MEALS
Qty: 180 TABLET | Refills: 3 | Status: SHIPPED | OUTPATIENT
Start: 2023-05-19 | End: 2024-09-19

## 2023-05-19 RX ORDER — LISINOPRIL 5 MG/1
5 TABLET ORAL DAILY
Qty: 90 TABLET | Refills: 3 | Status: SHIPPED | OUTPATIENT
Start: 2023-05-19 | End: 2024-03-28

## 2023-05-19 ASSESSMENT — ENCOUNTER SYMPTOMS
HEMATOCHEZIA: 0
SHORTNESS OF BREATH: 0
EYE PAIN: 0
HEADACHES: 0
CHILLS: 0
PARESTHESIAS: 0
ABDOMINAL PAIN: 0
MYALGIAS: 0
JOINT SWELLING: 0
NERVOUS/ANXIOUS: 0
HEARTBURN: 0
DIZZINESS: 0
ARTHRALGIAS: 0
DIARRHEA: 0
HEMATURIA: 0
COUGH: 0
SORE THROAT: 0
FEVER: 0
PALPITATIONS: 0
NAUSEA: 0
DYSURIA: 0
CONSTIPATION: 0
FREQUENCY: 0
WEAKNESS: 0

## 2023-05-19 ASSESSMENT — PAIN SCALES - GENERAL: PAINLEVEL: NO PAIN (0)

## 2023-05-19 ASSESSMENT — ACTIVITIES OF DAILY LIVING (ADL): CURRENT_FUNCTION: NO ASSISTANCE NEEDED

## 2023-05-19 NOTE — PROGRESS NOTES
"SUBJECTIVE:   Bry is a 72 year old who presents for Preventive Visit.      5/19/2023     6:55 AM   Additional Questions   Roomed by Amara     Patient has been advised of split billing requirements and indicates understanding: Yes  Are you in the first 12 months of your Medicare coverage?  No    Healthy Habits:     In general, how would you rate your overall health?  Good    Frequency of exercise:  None    Do you usually eat at least 4 servings of fruit and vegetables a day, include whole grains    & fiber and avoid regularly eating high fat or \"junk\" foods?  Yes    Taking medications regularly:  Yes    Medication side effects:  None    Ability to successfully perform activities of daily living:  No assistance needed    Home Safety:  No safety concerns identified    Hearing Impairment:  No hearing concerns    In the past 6 months, have you been bothered by leaking of urine?  No    In general, how would you rate your overall mental or emotional health?  Good      PHQ-2 Total Score: 0    Additional concerns today:  No        Have you ever done Advance Care Planning? (For example, a Health Directive, POLST, or a discussion with a medical provider or your loved ones about your wishes): Yes, advance care planning is on file.       Fall risk  Fallen 2 or more times in the past year?: No  Any fall with injury in the past year?: No    Cognitive Screening   1) Repeat 3 items (Leader, Season, Table)    2) Clock draw: NORMAL  3) 3 item recall: Recalls NO objects   Results: 0 items recalled: PROBABLE COGNITIVE IMPAIRMENT, **INFORM PROVIDER**    Mini-CogTM Copyright DEMETRIUS Liang. Licensed by the author for use in [facility]; reprinted with permission ([email or web address]). All rights reserved.      Do you have sleep apnea, excessive snoring or daytime drowsiness?: yes    Reviewed and updated as needed this visit by clinical staff                  Reviewed and updated as needed this visit by Provider                 Social " History     Tobacco Use     Smoking status: Former     Packs/day: 4.00     Years: 15.00     Pack years: 60.00     Types: Cigarettes     Quit date: 1971     Years since quittin.9     Smokeless tobacco: Never   Vaping Use     Vaping status: Not on file   Substance Use Topics     Alcohol use: Yes     Alcohol/week: 0.0 standard drinks of alcohol     Comment: occPaul maloney              View : No data to display.              Do you have a current opioid prescription? No  Do you use any other controlled substances or medications that are not prescribed by a provider? None        Current providers sharing in care for this patient include:   Patient Care Team:  Xander Lee MD as PCP - General (Family Practice)  Xander Lee MD as Assigned PCP    The following health maintenance items are reviewed in Epic and correct as of today:  Health Maintenance   Topic Date Due     HF ACTION PLAN  Never done     MICROALBUMIN  Never done     ZOSTER IMMUNIZATION (1 of 2) Never done     DTAP/TDAP/TD IMMUNIZATION (1 - Tdap) 10/29/1998     TSH W/FREE T4 REFLEX  2016     Pneumococcal Vaccine: 65+ Years (2 - PCV) 2016     MEDICARE ANNUAL WELLNESS VISIT  2022     FALL RISK ASSESSMENT  2022     COVID-19 Vaccine (4 - Pfizer series) 2022     BMP  2022     HEMOGLOBIN  2022     INFLUENZA VACCINE (1) Never done     PHQ-2 (once per calendar year)  2023     ALT  2023     LIPID  2023     CBC  2023     COLORECTAL CANCER SCREENING  2023     ANNUAL REVIEW OF HM ORDERS  2023     ADVANCE CARE PLANNING  2026     PARATHYROID  Completed     PHOSPHORUS  Completed     URINALYSIS  Completed     ALK PHOS  Completed     AORTIC ANEURYSM SCREENING (SYSTEM ASSIGNED)  Completed     IPV IMMUNIZATION  Aged Out     MENINGITIS IMMUNIZATION  Aged Out     HEPATITIS C SCREENING  Discontinued     BP Readings from Last 3 Encounters:   23 94/52   22 113/71   22 103/64     Wt Readings from Last 3 Encounters:   05/19/23 75.4 kg (166 lb 3.2 oz)   08/24/22 76.7 kg (169 lb)   02/28/22 76.2 kg (168 lb)                  Patient Active Problem List   Diagnosis     Dyspnea and respiratory abnormality     Impotence of organic origin     Adjustment reaction     CARDIOVASCULAR SCREENING; LDL GOAL LESS THAN 130     Pain, joint, knee, right     Advanced directives, counseling/discussion     Arthritis of knee, right     Unstable angina (H)     Idiopathic cardiomyopathy (H)     Congestive heart failure (H)     Hypertension     Hyperlipidemia     CAD (coronary artery disease)     Mitral regurgitation     Atypical chest pain     RAYSHAWN (acute kidney injury) (H)     Orthostatic hypotension     Anemia, unspecified     Intestinal malabsorption, unspecified     Iron adverse reaction     Need for prophylactic measure     S/P TKR (total knee replacement) not using cement, right     CKD (chronic kidney disease) stage 4, GFR 15-29 ml/min (H)     CKD (chronic kidney disease) stage 5, GFR less than 15 ml/min (H)     Past Surgical History:   Procedure Laterality Date     ARTHROPLASTY KNEE Right 5/24/2017    Procedure: ARTHROPLASTY KNEE;  RIGHT TOTAL KNEE ARTHROPLASTY (BIOMET)^ ;  Surgeon: Marco A Iyer MD;  Location:  OR     ESOPHAGOSCOPY, GASTROSCOPY, DUODENOSCOPY (EGD), COMBINED N/A 1/24/2017    Procedure: COMBINED ESOPHAGOSCOPY, GASTROSCOPY, DUODENOSCOPY (EGD), BIOPSY SINGLE OR MULTIPLE;  Surgeon: Reji Mcghee MD;  Location:  GI     ESOPHAGOSCOPY, GASTROSCOPY, DUODENOSCOPY (EGD), COMBINED N/A 5/26/2017    Procedure: COMBINED ESOPHAGOSCOPY, GASTROSCOPY, DUODENOSCOPY (EGD);  gastroscopy;  Surgeon: Renae Mendosa MD;  Location:  GI     HC REMOVAL OF TONSILS,<11 Y/O      Tonsils <10y.o.     HC REPAIR ROTATOR CUFF,ACUTE  02 and 03    left rotator cuff repair     ZZC NONSPECIFIC PROCEDURE  2002    ulnar nerve     ZZC NONSPECIFIC PROCEDURE      right rotator cuff repair       Social History      Tobacco Use     Smoking status: Former     Packs/day: 4.00     Years: 15.00     Pack years: 60.00     Types: Cigarettes     Quit date: 1971     Years since quittin.9     Smokeless tobacco: Never   Vaping Use     Vaping status: Never Used   Substance Use Topics     Alcohol use: Yes     Alcohol/week: 0.0 standard drinks of alcohol     Comment: occ. beer     Family History   Problem Relation Age of Onset     Respiratory Father         COPD     Unknown/Adopted Mother      Cancer Brother      Cancer Brother      Connective Tissue Disorder Paternal Grandfather      Depression Daughter      Eye Disorder Son          Current Outpatient Medications   Medication Sig Dispense Refill     ASPIRIN NOT PRESCRIBED (INTENTIONAL) Please choose reason not prescribed from choices below.       carvedilol (COREG) 6.25 MG tablet Take 1 tablet (6.25 mg) by mouth 2 times daily (with meals) 180 tablet 3     ferrous gluconate (FERGON) 324 (38 Fe) MG tablet Take 1 tablet (324 mg) by mouth daily (with breakfast) 90 tablet 3     furosemide (LASIX) 20 MG tablet TAKE ONE TABLET BY MOUTH DAILY AS NEEDED 90 tablet 3     lisinopril (ZESTRIL) 5 MG tablet Take 1 tablet (5 mg) by mouth daily 90 tablet 3     rosuvastatin (CRESTOR) 20 MG tablet Take 1 tablet (20 mg) by mouth daily 90 tablet 3     tamsulosin (FLOMAX) 0.4 MG capsule Take 1 capsule (0.4 mg) by mouth daily 90 capsule 3     Allergies   Allergen Reactions     No Known Allergies      Recent Labs   Lab Test 22  1500 21  0950 21  1026 10/19/20  1104   LDL 59  --  62 84   HDL 41  --  40 57   TRIG 117  --  155* 115   ALT 11  --  38 10   CR 2.94* 4.31* 4.98* 5.47*   GFRESTIMATED 22* 13* 11* 9*   GFRESTBLACK  --   --  12* 11*   POTASSIUM 5.2 3.5 3.9 3.6                Review of Systems   Constitutional: Negative for chills and fever.   HENT: Negative for congestion, ear pain, hearing loss and sore throat.    Eyes: Negative for pain and visual disturbance.   Respiratory:  "Negative for cough and shortness of breath.    Cardiovascular: Negative for chest pain, palpitations and peripheral edema.   Gastrointestinal: Negative for abdominal pain, constipation, diarrhea, heartburn, hematochezia and nausea.   Genitourinary: Negative for dysuria, frequency, genital sores, hematuria, impotence, penile discharge and urgency.   Musculoskeletal: Negative for arthralgias, joint swelling and myalgias.   Skin: Negative for rash.   Neurological: Negative for dizziness, weakness, headaches and paresthesias.   Psychiatric/Behavioral: Negative for mood changes. The patient is not nervous/anxious.          OBJECTIVE:   BP 94/52 (BP Location: Right arm, Patient Position: Sitting, Cuff Size: Adult Regular)   Pulse 69   Temp 96.8  F (36  C) (Tympanic)   Resp 16   Ht 1.675 m (5' 5.95\")   Wt 75.4 kg (166 lb 3.2 oz)   SpO2 99%   BMI 26.87 kg/m   Estimated body mass index is 26.47 kg/m  as calculated from the following:    Height as of 2/28/22: 1.702 m (5' 7\").    Weight as of 8/24/22: 76.7 kg (169 lb).  Physical Exam  GENERAL: healthy, alert and no distress  EYES: Eyes grossly normal to inspection, PERRL and conjunctivae and sclerae normal  HENT: ear canals and TM's normal, nose and mouth without ulcers or lesions  NECK: no adenopathy, no asymmetry, masses, or scars and thyroid normal to palpation  RESP: lungs clear to auscultation - no rales, rhonchi or wheezes  CV: regular rate and rhythm, normal S1 S2, no S3 or S4, no murmur, click or rub, no peripheral edema and peripheral pulses strong  ABDOMEN: soft, nontender, no hepatosplenomegaly, no masses and bowel sounds normal  MS: no gross musculoskeletal defects noted, no edema  NEURO: Normal strength and tone, mentation intact and speech normal  BACK: no CVA tenderness, no paralumbar tenderness  PSYCH: mentation appears normal, affect normal/bright  LYMPH: no cervical, supraclavicular, axillary, or inguinal adenopathy        ASSESSMENT / PLAN:       " ICD-10-CM    1. Encounter for general adult medical examination with abnormal findings  Z00.01 Albumin Random Urine Quantitative with Creat Ratio     TSH WITH FREE T4 REFLEX     BASIC METABOLIC PANEL     ALT     Lipid panel reflex to direct LDL Non-fasting     CBC with Platelets     Fecal colorectal cancer screen FIT - Future (S+30)     PSA, screen      2. CKD (chronic kidney disease) stage 4, GFR 15-29 ml/min (H)  N18.4 Albumin Random Urine Quantitative with Creat Ratio     BASIC METABOLIC PANEL      3. Mixed hyperlipidemia  E78.2 ALT     rosuvastatin (CRESTOR) 20 MG tablet      4. Coronary artery disease due to calcified coronary lesion  I25.10 Lipid panel reflex to direct LDL Non-fasting    I25.84       5. Congestive heart failure, unspecified HF chronicity, unspecified heart failure type (H)  I50.9 CBC with Platelets      6. Screen for colon cancer  Z12.11 Fecal colorectal cancer screen FIT - Future (S+30)      7. Need for shingles vaccine  Z23       8. Screening for prostate cancer  Z12.5 PSA, screen      9. Hypothyroidism, unspecified type  E03.9 TSH WITH FREE T4 REFLEX      10. CKD (chronic kidney disease) stage 5, GFR less than 15 ml/min (H)  N18.5       11. Benign essential hypertension  I10 carvedilol (COREG) 6.25 MG tablet     furosemide (LASIX) 20 MG tablet     lisinopril (ZESTRIL) 5 MG tablet      12. Acute venous embolism and thrombosis of deep vessels of distal lower extremity, right (H)  I82.4Z1 ferrous gluconate (FERGON) 324 (38 Fe) MG tablet      13. Ulcer of esophagus without bleeding  K22.10 ferrous gluconate (FERGON) 324 (38 Fe) MG tablet      14. S/P TKR (total knee replacement) not using cement, right  Z96.651 ferrous gluconate (FERGON) 324 (38 Fe) MG tablet      15. Flank pain  R10.9 tamsulosin (FLOMAX) 0.4 MG capsule      16. Acute bilateral back pain, unspecified back location  M54.9 tamsulosin (FLOMAX) 0.4 MG capsule      17. Dysuria  R30.0 tamsulosin (FLOMAX) 0.4 MG capsule           Patient has been advised of split billing requirements and indicates understanding: Yes      COUNSELING:  Reviewed preventive health counseling, as reflected in patient instructions        He reports that he quit smoking about 51 years ago. His smoking use included cigarettes. He has a 60.00 pack-year smoking history. He has never used smokeless tobacco.      Appropriate preventive services were discussed with this patient, including applicable screening as appropriate for cardiovascular disease, diabetes, osteopenia/osteoporosis, and glaucoma.  As appropriate for age/gender, discussed screening for colorectal cancer, prostate cancer, breast cancer, and cervical cancer. Checklist reviewing preventive services available has been given to the patient.    Reviewed patients plan of care and provided an AVS. The Basic Care Plan (routine screening as documented in Health Maintenance) for Vaibhav meets the Care Plan requirement. This Care Plan has been established and reviewed with the Patient.          Xander Lee MD  [facility]    Identified Health Risks:    I have reviewed Opioid Use Disorder and Substance Use Disorder risk factors and made any needed referrals.

## 2023-05-19 NOTE — PROGRESS NOTES
"    He is at risk for lack of exercise and has been provided with information to increase physical activity for the benefit of his well-being.  Answers for HPI/ROS submitted by the patient on 5/19/2023  In general, how would you rate your overall physical health?: good  Frequency of exercise:: None  Do you usually eat at least 4 servings of fruit and vegetables a day, include whole grains & fiber, and avoid regularly eating high fat or \"junk\" foods? : Yes  Taking medications regularly:: Yes  Medication side effects:: None  Activities of Daily Living: no assistance needed  Home safety: no safety concerns identified  Hearing Impairment:: no hearing concerns  In the past 6 months, have you been bothered by leaking of urine?: No  abdominal pain: No  Blood in stool: No  Blood in urine: No  chest pain: No  chills: No  congestion: No  constipation: No  cough: No  diarrhea: No  dizziness: No  ear pain: No  eye pain: No  nervous/anxious: No  fever: No  frequency: No  genital sores: No  headaches: No  hearing loss: No  heartburn: No  arthralgias: No  joint swelling: No  peripheral edema: No  mood changes: No  myalgias: No  nausea: No  dysuria: No  palpitations: No  Skin sensation changes: No  sore throat: No  urgency: No  rash: No  shortness of breath: No  visual disturbance: No  weakness: No  impotence: No  penile discharge: No  In general, how would you rate your overall mental or emotional health?: good  Additional concerns today:: No        "

## 2023-05-19 NOTE — LETTER
May 22, 2023      Bry Crabtree  [address] [address]        Dear ,    We are writing to inform you of your test results.    Dear Byr,     You maybe able to check the lab results via [employer], it showed your lab results including prostate specific antigen/thyroid function/cholesterol indexes and liver enzyme are normal.     Your blood cell counts are suppressed, but better than last time.     Your renal function is suppressed seriously with a finding of dehydration. Your electrolyte balance is slightly out of range due to renal function. We strongly recommend you to see nephrology as was suggested in the past. I will place another referral for you.   [employer] will call you to coordinate your care.  If you don't hear from a representative, please call [phone].     Resulted Orders   TSH WITH FREE T4 REFLEX   Result Value Ref Range    TSH 3.50 0.30 - 4.20 uIU/mL   BASIC METABOLIC PANEL   Result Value Ref Range    Sodium 144 136 - 145 mmol/L    Potassium 3.9 3.4 - 5.3 mmol/L    Chloride 110 (H) 98 - 107 mmol/L    Carbon Dioxide (CO2) 20 (L) 22 - 29 mmol/L    Anion Gap 14 7 - 15 mmol/L    Urea Nitrogen 34.4 (H) 8.0 - 23.0 mg/dL    Creatinine 3.59 (H) 0.67 - 1.17 mg/dL    Calcium 9.3 8.8 - 10.2 mg/dL    Glucose 100 (H) 70 - 99 mg/dL    GFR Estimate 17 (L) >60 mL/min/1.73m2      Comment:      eGFR calculated using 2021 CKD-EPI equation.   ALT   Result Value Ref Range    ALT 7 (L) 10 - 50 U/L   Lipid panel reflex to direct LDL Non-fasting   Result Value Ref Range    Cholesterol 144 <200 mg/dL    Triglycerides 102 <150 mg/dL    Direct Measure HDL 37 (L) >=40 mg/dL    LDL Cholesterol Calculated 87 <=100 mg/dL    Non HDL Cholesterol 107 <130 mg/dL    Narrative    Cholesterol  Desirable:  <200 mg/dL    Triglycerides  Normal:  Less than 150 mg/dL  Borderline High:  150-199 mg/dL  High:  200-499 mg/dL  Very High:  Greater than or equal to 500 mg/dL    Direct Measure HDL  Female:   Greater than or equal to 50 mg/dL   Male:  Greater than or equal to 40 mg/dL    LDL Cholesterol  Desirable:  <100mg/dL  Above Desirable:  100-129 mg/dL   Borderline High:  130-159 mg/dL   High:  160-189 mg/dL   Very High:  >= 190 mg/dL    Non HDL Cholesterol  Desirable:  130 mg/dL  Above Desirable:  130-159 mg/dL  Borderline High:  160-189 mg/dL  High:  190-219 mg/dL  Very High:  Greater than or equal to 220 mg/dL   CBC with Platelets   Result Value Ref Range    WBC Count 7.0 4.0 - 11.0 10e3/uL    RBC Count 4.13 (L) 4.40 - 5.90 10e6/uL    Hemoglobin 12.4 (L) 13.3 - 17.7 g/dL    Hematocrit 39.7 (L) 40.0 - 53.0 %    MCV 96 78 - 100 fL    MCH 30.0 26.5 - 33.0 pg    MCHC 31.2 (L) 31.5 - 36.5 g/dL    RDW 14.1 10.0 - 15.0 %    Platelet Count 291 150 - 450 10e3/uL   PSA, screen   Result Value Ref Range    Prostate Specific Antigen Screen 2.71 0.00 - 6.50 ng/mL    Narrative    This result is obtained using the Roche Elecsys total PSA method on the rocío [id number] immunoassay analyzer. Results obtained with different assay methods or kits cannot be used interchangeably.       If you have any questions or concerns, please call the clinic at the number listed above.       Sincerely,      Xander Lee MD

## 2023-05-19 NOTE — PATIENT INSTRUCTIONS
Patient Education   Personalized Prevention Plan  You are due for the preventive services outlined below.  Your care team is available to assist you in scheduling these services.  If you have already completed any of these items, please share that information with your care team to update in your medical record.  Health Maintenance Due   Topic Date Due     Heart Failure Action Plan  Never done     Kidney Microalbumin Urine Test  Never done     Zoster (Shingles) Vaccine (1 of 2) Never done     Diptheria Tetanus Pertussis (DTAP/TDAP/TD) Vaccine (1 - Tdap) 10/29/1998     Thyroid Function Lab  01/11/2016     Pneumococcal Vaccine (2 - PCV) 01/12/2016     COVID-19 Vaccine (4 - Pfizer series) 04/25/2022     Basic Metabolic Panel  08/28/2022     Hemoglobin  08/28/2022     Flu Vaccine (1) Never done     Liver Monitoring Lab  02/28/2023     Cholesterol Lab  02/28/2023     Complete Blood Count  02/28/2023     Colorectal Cancer Screening  03/05/2023       Exercise for a Healthier Heart  You may wonder how you can improve the health of your heart. If you re thinking about exercise, you re on the right track. You don t need to become an athlete. But you do need a certain amount of brisk exercise to help strengthen your heart. If you have been diagnosed with a heart condition, your healthcare provider may advise exercise to help your condition. To help make exercise a habit, choose safe, fun activities.      Exercise with a friend. When activity is fun, you're more likely to stick with it.     Before you start  Check with your healthcare provider before starting an exercise program. This is especially important if you haven't been active for a while. It's also important if you have a long-term (chronic) health problem such as heart disease, diabetes, or obesity. Also check with your provider if you're at high risk for having these problems.   Why exercise?  Exercising regularly offers many healthy rewards. It can help you do all  of these:     Improve your blood cholesterol level to help prevent further heart trouble.    Lower your blood pressure to help prevent a stroke or heart attack.    Control diabetes or reduce your risk of getting this disease.    Improve your heart and lung function.    Reach and stay at a healthy weight.    Make your muscles stronger so you can stay active.    Prevent falls and fractures by slowing the loss of bone mass (osteoporosis).    Manage stress better.    Improve your sense of self and your body image.  Exercise tips      Ease into your routine. Set small goals. Then build on them. Talk with your healthcare provider first before starting an exercise routine if you're not sure what your activity level should be.    Exercise on most days. Aim for a total of at least 150 minutes (2 hours and 30 minutes) or more of moderate-intensity aerobic activity each week. You could also do 75 minutes (1 hour and 15 minutes) or more of vigorous-intensity aerobic activity each week. Or try for a combination of both. Moderate activity means that you breathe heavier and your heart rate increases, but you can still talk. Think about doing at least 30 minutes of moderate exercise, 5 times a week. It's OK to work up to the 30-minute period over time. Examples of moderate-intensity activity are brisk walking, gardening, and water aerobics.    Step up your daily activity level.  Along with your exercise program, try being more active the whole day. Walk instead of drive. Or park further away so that you take more steps each day. Do more household tasks or yard work. You may not be able to meet the advised amount of physical activity. But doing some moderate- or vigorous-intensity aerobic activity can help reduce your risk for heart disease. Your healthcare provider can help you figure out what is best for you.    Choose 1 or more activities you enjoy.  Walking is one of the easiest things you can do. You can also try swimming,  riding a bike, dancing, or taking an exercise class.    Call 911  Call 911 right away if any of these occur:     Chest pain that doesn't go away quickly with rest    New burning, tightness, pressure, or heaviness in your chest, neck, shoulders, back, or arms    Abnormal or severe shortness of breath    A very fast or irregular heartbeat (palpitations)    Fainting  When to call your healthcare provider  Call your healthcare provider if you have any of these:     Dizziness or lightheadedness    Mild shortness of breath or chest pain    Increased or new joint or muscle pain    Pat last reviewed this educational content on 7/1/2022 2000-2022 [employer]. All rights reserved. This information is not intended as a substitute for professional medical care. Always follow your healthcare professional's instructions.

## 2023-05-22 PROCEDURE — 82274 ASSAY TEST FOR BLOOD FECAL: CPT | Performed by: FAMILY MEDICINE

## 2023-05-23 LAB — HEMOCCULT STL QL IA: NEGATIVE

## 2023-06-15 ENCOUNTER — TELEPHONE (OUTPATIENT)
Dept: FAMILY MEDICINE | Facility: CLINIC | Age: 73
End: 2023-06-15

## 2023-06-15 ENCOUNTER — OFFICE VISIT (OUTPATIENT)
Dept: FAMILY MEDICINE | Facility: CLINIC | Age: 73
End: 2023-06-15
Payer: COMMERCIAL

## 2023-06-15 VITALS
TEMPERATURE: 97.8 F | RESPIRATION RATE: 17 BRPM | BODY MASS INDEX: 27.66 KG/M2 | WEIGHT: 166 LBS | SYSTOLIC BLOOD PRESSURE: 108 MMHG | HEART RATE: 71 BPM | DIASTOLIC BLOOD PRESSURE: 64 MMHG | OXYGEN SATURATION: 95 % | HEIGHT: 65 IN

## 2023-06-15 DIAGNOSIS — N18.5 CKD (CHRONIC KIDNEY DISEASE) STAGE 5, GFR LESS THAN 15 ML/MIN (H): Primary | ICD-10-CM

## 2023-06-15 DIAGNOSIS — H26.9 CATARACT OF BOTH EYES, UNSPECIFIED CATARACT TYPE: ICD-10-CM

## 2023-06-15 DIAGNOSIS — Z01.818 PRE-OP EXAM: ICD-10-CM

## 2023-06-15 PROCEDURE — 99214 OFFICE O/P EST MOD 30 MIN: CPT | Performed by: FAMILY MEDICINE

## 2023-06-15 PROCEDURE — 93000 ELECTROCARDIOGRAM COMPLETE: CPT | Performed by: FAMILY MEDICINE

## 2023-06-15 ASSESSMENT — PATIENT HEALTH QUESTIONNAIRE - PHQ9
SUM OF ALL RESPONSES TO PHQ QUESTIONS 1-9: 0
SUM OF ALL RESPONSES TO PHQ QUESTIONS 1-9: 0
10. IF YOU CHECKED OFF ANY PROBLEMS, HOW DIFFICULT HAVE THESE PROBLEMS MADE IT FOR YOU TO DO YOUR WORK, TAKE CARE OF THINGS AT HOME, OR GET ALONG WITH OTHER PEOPLE: NOT DIFFICULT AT ALL

## 2023-06-15 ASSESSMENT — PAIN SCALES - GENERAL: PAINLEVEL: NO PAIN (0)

## 2023-06-15 NOTE — PROGRESS NOTES
[address] [address]  Phone: [phone]  Primary Provider: Rena Lee  Pre-op Performing Provider: RENA LEE      PREOPERATIVE EVALUATION:  Today's date: 6/15/2023    Vaibhav Crabtree is a 72 year old male who presents for a preoperative evaluation.               Surgical Information:  Surgery/Procedure: Right Eye  Surgery Location: [facility]  Surgeon: Dr Jara  Surgery Date: 06/23  Time of Surgery: tbd  Where patient plans to recover: At home with family  Fax number for surgical facility: [phone]    Assessment & Plan     The proposed surgical procedure is considered LOW risk.    CKD (chronic kidney disease) stage 5, GFR less than 15 ml/min (H)      Pre-op exam    - EKG 12-lead complete w/read - Clinics    Cataract of both eyes, unspecified cataract type    - EKG 12-lead complete w/read - Clinics           Risks and Recommendations:  The patient has the following additional risks and recommendations for perioperative complications:   - No identified additional risk factors other than previously addressed    Antiplatelet or Anticoagulation Medication Instructions:   - aspirin: Bleeding risk is low for this procedure and daily aspirin may be continued without modification.     Additional Medication Instructions:  Patient is to take all scheduled medications on the day of surgery    RECOMMENDATION:  APPROVAL GIVEN to proceed with proposed procedure, without further diagnostic evaluation.          Subjective         6/15/2023    10:55 AM   Preop Questions   1. Have you ever had a heart attack or stroke? No   2. Have you ever had surgery on your heart or blood vessels, such as a stent placement, a coronary artery bypass, or surgery on an artery in your head, neck, heart, or legs? No   3. Do you have chest pain with activity? No   4. Do you have a history of  heart failure? YES    5. Do you currently have a cold, bronchitis or symptoms of  other infection? No   6. Do you have a cough, shortness of breath, or wheezing? No   7. Do you or anyone in your family have previous history of blood clots? No   8. Do you or does anyone in your family have a serious bleeding problem such as prolonged bleeding following surgeries or cuts? No   9. Have you ever had problems with anemia or been told to take iron pills? No   10. Have you had any abnormal blood loss such as black, tarry or bloody stools? No   11. Have you ever had a blood transfusion? No   12. Are you willing to have a blood transfusion if it is medically needed before, during, or after your surgery? Yes   13. Have you or any of your relatives ever had problems with anesthesia? No   14. Do you have sleep apnea, excessive snoring or daytime drowsiness? No   15. Do you have any artifical heart valves or other implanted medical devices like a pacemaker, defibrillator, or continuous glucose monitor? No   16. Do you have artificial joints? YES    17. Are you allergic to latex? No       Health Care Directive:  Patient has a Health Care Directive on file      Preoperative Review of :   reviewed - no record of controlled substances prescribed.      Status of Chronic Conditions:  CHF - Patient has a longstanding history of moderate-severe CHF. Exacerbating conditions include dystolic dysfunction. Currently the patient's condition is stable. Current treatment regimen includes ACE inhibitor, beta blocker and diuretic. The patient denies chest pain, edema, orthopnea, SOB or recent weight gain.        Review of Systems  CONSTITUTIONAL: NEGATIVE for fever, chills, change in weight  ENT/MOUTH: NEGATIVE for ear, mouth and throat problems  RESP: NEGATIVE for significant cough or SOB  CV: NEGATIVE for chest pain, palpitations or peripheral edema    Patient Active Problem List    Diagnosis Date Noted     CKD (chronic kidney disease) stage 5, GFR less than 15 ml/min (H) 05/19/2023     Priority: Medium     CKD  (chronic kidney disease) stage 4, GFR 15-29 ml/min (H) 10/19/2020     Priority: Medium     S/P TKR (total knee replacement) not using cement, right 05/24/2017     Priority: Medium     Anemia, unspecified 05/04/2017     Priority: Medium     Intestinal malabsorption, unspecified 05/04/2017     Priority: Medium     Iron adverse reaction 05/04/2017     Priority: Medium     Need for prophylactic measure 05/04/2017     Priority: Medium     Orthostatic hypotension 01/04/2017     Priority: Medium     RAYSHAWN (acute kidney injury) (H) 02/12/2015     Priority: Medium     Atypical chest pain 01/26/2015     Priority: Medium     Idiopathic cardiomyopathy (H)      Priority: Medium     EF 30% on 5/2014 echo, EF improved to 50-55% on 1/12/15 echo       Congestive heart failure (H)      Priority: Medium     Hypertension      Priority: Medium     Hyperlipidemia      Priority: Medium     CAD (coronary artery disease)      Priority: Medium     CTa 1/2015- Ca+ score of 237.02, showed 50-70% stenosis in LAD       Mitral regurgitation      Priority: Medium     1+ on 1/2015 echo       Unstable angina (H) 01/11/2015     Priority: Medium     Advanced directives, counseling/discussion 09/10/2012     Priority: Medium     Advance Care Planning:   Receipt of ACP document:  Received: Health Care Directive which was witnessed or notarized on 6/12/13.  Document not previously scanned.  Validation form completed and sent with document to be scanned.  Code Status not addressed on this form.  Confirmed/documented designated decision maker(s). See permanent comments section of demographics in clinical tab. View document(s) and details by clicking on code status.   Added by Nadia Hassan on 6/25/2013                  Arthritis of knee, right 09/10/2012     Priority: Medium     Pain, joint, knee, right 08/22/2012     Priority: Medium     arthritis        CARDIOVASCULAR SCREENING; LDL GOAL LESS THAN 130 10/31/2010     Priority: Medium     Adjustment  reaction 06/29/2010     Priority: Medium     Problem list name updated by automated process. Provider to review       Impotence of organic origin 07/09/2008     Priority: Medium     Dyspnea and respiratory abnormality 11/29/2005     Priority: Medium     Problem list name updated by automated process. Provider to review        Past Medical History:   Diagnosis Date     Bite by unspecified animal(E906.5)      CAD (coronary artery disease)     CTa 1/2015- Ca+ score of 237.02, showed 50-70% stenosis in LAD     Claudication of right lower extremity (H)      Congestive heart failure (H)      Hyperlipidemia      Hypertension      Idiopathic cardiomyopathy (H)     EF 30% on 5/2014 echo, EF improved to 50-55% on 1/12/15 echo     Mitral regurgitation     1+ on 1/2015 echo     Osteoarthritis      Thrombosis of leg      Past Surgical History:   Procedure Laterality Date     ARTHROPLASTY KNEE Right 5/24/2017    Procedure: ARTHROPLASTY KNEE;  RIGHT TOTAL KNEE ARTHROPLASTY (BIOMET)^ ;  Surgeon: Marco A Iyer MD;  Location:  OR     ESOPHAGOSCOPY, GASTROSCOPY, DUODENOSCOPY (EGD), COMBINED N/A 1/24/2017    Procedure: COMBINED ESOPHAGOSCOPY, GASTROSCOPY, DUODENOSCOPY (EGD), BIOPSY SINGLE OR MULTIPLE;  Surgeon: Reji Mcghee MD;  Location:  GI     ESOPHAGOSCOPY, GASTROSCOPY, DUODENOSCOPY (EGD), COMBINED N/A 5/26/2017    Procedure: COMBINED ESOPHAGOSCOPY, GASTROSCOPY, DUODENOSCOPY (EGD);  gastroscopy;  Surgeon: Renae Mendosa MD;  Location:  GI     HC REMOVAL OF TONSILS,<11 Y/O      Tonsils <10y.o.     HC REPAIR ROTATOR CUFF,ACUTE  02 and 03    left rotator cuff repair     [facility] NONSPECIFIC PROCEDURE  2002    ulnar nerve     [facility] NONSPECIFIC PROCEDURE      right rotator cuff repair     Current Outpatient Medications   Medication Sig Dispense Refill     ASPIRIN NOT PRESCRIBED (INTENTIONAL) Please choose reason not prescribed from choices below.       carvedilol (COREG) 6.25 MG tablet Take 1 tablet (6.25 mg) by mouth 2  "times daily (with meals) 180 tablet 3     ferrous gluconate (FERGON) 324 (38 Fe) MG tablet Take 1 tablet (324 mg) by mouth daily (with breakfast) 90 tablet 3     furosemide (LASIX) 20 MG tablet TAKE ONE TABLET BY MOUTH DAILY AS NEEDED 90 tablet 3     lisinopril (ZESTRIL) 5 MG tablet Take 1 tablet (5 mg) by mouth daily 90 tablet 3     rosuvastatin (CRESTOR) 20 MG tablet Take 1 tablet (20 mg) by mouth daily 90 tablet 3     tamsulosin (FLOMAX) 0.4 MG capsule Take 1 capsule (0.4 mg) by mouth daily 90 capsule 3       Allergies   Allergen Reactions     No Known Allergies         Social History     Tobacco Use     Smoking status: Former     Packs/day: 4.00     Years: 15.00     Pack years: 60.00     Types: Cigarettes     Quit date: 1971     Years since quittin.0     Smokeless tobacco: Never   Vaping Use     Vaping status: Never Used   Substance Use Topics     Alcohol use: Yes     Alcohol/week: 0.0 standard drinks of alcohol     Comment: occ. beer     Family History   Problem Relation Age of Onset     Respiratory Father         COPD     Unknown/Adopted Mother      Cancer Brother      Cancer Brother      Connective Tissue Disorder Paternal Grandfather      Depression Daughter      Eye Disorder Son      History   Drug Use No         Objective     /64   Pulse 71   Temp 97.8  F (36.6  C) (Temporal)   Resp 17   Ht 1.651 m (5' 5\")   Wt 75.3 kg (166 lb)   SpO2 95%   BMI 27.62 kg/m      Physical Exam  GENERAL APPEARANCE: healthy, alert and no distress  HENT: ear canals and TM's normal and nose and mouth without ulcers or lesions  RESP: lungs clear to auscultation - no rales, rhonchi or wheezes  CV: regular rate and rhythm, normal S1 S2, no S3 or S4 and no murmur, click or rub   ABDOMEN: soft, nontender, no HSM or masses and bowel sounds normal  NEURO: Normal strength and tone, sensory exam grossly normal, mentation intact and speech normal    Recent Labs   Lab Test 23  0730 22  1500   HGB 12.4* " 10.1*    258    142   POTASSIUM 3.9 5.2   CR 3.59* 2.94*        Diagnostics:  No labs were ordered during this visit.   EKG: appears normal, NSR, unchanged from previous tracings    Revised Cardiac Risk Index (RCRI):  The patient has the following serious cardiovascular risks for perioperative complications:   - High risk surgery (>5% cardiac complication risk) = 1 point     RCRI Interpretation: 1 point: Class II (low risk - 0.9% complication rate)           Signed Electronically by: Xander Lee MD  Copy of this evaluation report is provided to requesting physician.      Answers for HPI/ROS submitted by the patient on 6/15/2023  If you checked off any problems, how difficult have these problems made it for you to do your work, take care of things at home, or get along with other people?: Not difficult at all  PHQ9 TOTAL SCORE: 0

## 2023-06-15 NOTE — TELEPHONE ENCOUNTER
General Call    Contacts       Type Contact Phone/Fax    06/15/2023 12:58 PM CDT Phone (Incoming) Bry Crabtree (Self) [phone] (M)        Reason for Call:  To send information from PCP to doc for preop    What are your questions or concerns:  Fax: [phone]  Office: [phone]    Date of last appointment with provider: Today 6/15/2023    Okay to leave a detailed message?: Yes at Work number on file:  There is no work phone number on file. or Cell number on file:    Telephone Information:   Mobile [phone]

## 2023-11-07 ENCOUNTER — NURSE TRIAGE (OUTPATIENT)
Dept: NURSING | Facility: CLINIC | Age: 73
End: 2023-11-07
Payer: COMMERCIAL

## 2023-11-07 NOTE — TELEPHONE ENCOUNTER
Triage call  Patient calling to report  starting on Friday his ear started swelling on the outside of the ear it is painful and he can not  lay on that side because of the pain. There is no redness behind the ear    Per Protocol go to office now.  Care advice given.  Verbalizes understanding and agrees with plan. Transferred him to scheduling.    Irma Gonzales RN   [facility] Nurse Advisor  7:06 AM 11/7/2023    Reason for Disposition   Severe earache pain    Additional Information   Negative: Sounds like a life-threatening emergency to the triager   Negative: Moving the earlobe or touching the ear clearly increases the pain   Negative: Foreign body stuck in the ear (e.g., bug, piece of cotton)   Negative: Pink or red swelling behind the ear   Negative: Stiff neck (can't touch chin to chest)   Negative: Patient sounds very sick or weak to the triager    Protocols used: Earache-A-[place]

## 2023-11-08 ENCOUNTER — OFFICE VISIT (OUTPATIENT)
Dept: FAMILY MEDICINE | Facility: CLINIC | Age: 73
End: 2023-11-08
Payer: COMMERCIAL

## 2023-11-08 ENCOUNTER — TELEPHONE (OUTPATIENT)
Dept: FAMILY MEDICINE | Facility: CLINIC | Age: 73
End: 2023-11-08

## 2023-11-08 VITALS
HEART RATE: 73 BPM | WEIGHT: 168.4 LBS | TEMPERATURE: 97.3 F | DIASTOLIC BLOOD PRESSURE: 58 MMHG | HEIGHT: 66 IN | BODY MASS INDEX: 27.06 KG/M2 | OXYGEN SATURATION: 93 % | RESPIRATION RATE: 18 BRPM | SYSTOLIC BLOOD PRESSURE: 100 MMHG

## 2023-11-08 DIAGNOSIS — N18.5 CKD (CHRONIC KIDNEY DISEASE) STAGE 5, GFR LESS THAN 15 ML/MIN (H): Primary | ICD-10-CM

## 2023-11-08 DIAGNOSIS — H93.8X2 SWELLING OF LEFT EAR: ICD-10-CM

## 2023-11-08 LAB
ANION GAP SERPL CALCULATED.3IONS-SCNC: 15 MMOL/L (ref 7–15)
BASOPHILS # BLD AUTO: ABNORMAL 10*3/UL
BASOPHILS # BLD MANUAL: 0.1 10E3/UL (ref 0–0.2)
BASOPHILS NFR BLD AUTO: ABNORMAL %
BASOPHILS NFR BLD MANUAL: 1 %
BUN SERPL-MCNC: 52.9 MG/DL (ref 8–23)
BURR CELLS BLD QL SMEAR: SLIGHT
CALCIUM SERPL-MCNC: 8.6 MG/DL (ref 8.8–10.2)
CHLORIDE SERPL-SCNC: 108 MMOL/L (ref 98–107)
CREAT SERPL-MCNC: 6.04 MG/DL (ref 0.67–1.17)
DEPRECATED HCO3 PLAS-SCNC: 19 MMOL/L (ref 22–29)
EGFRCR SERPLBLD CKD-EPI 2021: 9 ML/MIN/1.73M2
EOSINOPHIL # BLD AUTO: ABNORMAL 10*3/UL
EOSINOPHIL # BLD MANUAL: 0.4 10E3/UL (ref 0–0.7)
EOSINOPHIL NFR BLD AUTO: ABNORMAL %
EOSINOPHIL NFR BLD MANUAL: 5 %
ERYTHROCYTE [DISTWIDTH] IN BLOOD BY AUTOMATED COUNT: 17.7 % (ref 10–15)
GLUCOSE SERPL-MCNC: 103 MG/DL (ref 70–99)
HCT VFR BLD AUTO: 36.5 % (ref 40–53)
HGB BLD-MCNC: 11.3 G/DL (ref 13.3–17.7)
IMM GRANULOCYTES # BLD: ABNORMAL 10*3/UL
IMM GRANULOCYTES NFR BLD: ABNORMAL %
LYMPHOCYTES # BLD AUTO: ABNORMAL 10*3/UL
LYMPHOCYTES # BLD MANUAL: 2.1 10E3/UL (ref 0.8–5.3)
LYMPHOCYTES NFR BLD AUTO: ABNORMAL %
LYMPHOCYTES NFR BLD MANUAL: 24 %
MCH RBC QN AUTO: 29.9 PG (ref 26.5–33)
MCHC RBC AUTO-ENTMCNC: 31 G/DL (ref 31.5–36.5)
MCV RBC AUTO: 97 FL (ref 78–100)
METAMYELOCYTES # BLD MANUAL: 0.1 10E3/UL
METAMYELOCYTES NFR BLD MANUAL: 1 %
MONOCYTES # BLD AUTO: ABNORMAL 10*3/UL
MONOCYTES # BLD MANUAL: 0.6 10E3/UL (ref 0–1.3)
MONOCYTES NFR BLD AUTO: ABNORMAL %
MONOCYTES NFR BLD MANUAL: 7 %
MYELOCYTES # BLD MANUAL: 0.2 10E3/UL
MYELOCYTES NFR BLD MANUAL: 2 %
NEUTROPHILS # BLD AUTO: ABNORMAL 10*3/UL
NEUTROPHILS # BLD MANUAL: 5.3 10E3/UL (ref 1.6–8.3)
NEUTROPHILS NFR BLD AUTO: ABNORMAL %
NEUTROPHILS NFR BLD MANUAL: 60 %
NRBC # BLD AUTO: 0 10E3/UL
NRBC BLD AUTO-RTO: 0 /100
PLAT MORPH BLD: ABNORMAL
PLATELET # BLD AUTO: 361 10E3/UL (ref 150–450)
POTASSIUM SERPL-SCNC: 3.3 MMOL/L (ref 3.4–5.3)
RBC # BLD AUTO: 3.78 10E6/UL (ref 4.4–5.9)
RBC MORPH BLD: ABNORMAL
SODIUM SERPL-SCNC: 142 MMOL/L (ref 135–145)
WBC # BLD AUTO: 8.9 10E3/UL (ref 4–11)

## 2023-11-08 PROCEDURE — 36415 COLL VENOUS BLD VENIPUNCTURE: CPT | Performed by: PHYSICIAN ASSISTANT

## 2023-11-08 PROCEDURE — 99214 OFFICE O/P EST MOD 30 MIN: CPT | Performed by: PHYSICIAN ASSISTANT

## 2023-11-08 PROCEDURE — 85027 COMPLETE CBC AUTOMATED: CPT | Performed by: PHYSICIAN ASSISTANT

## 2023-11-08 PROCEDURE — 80048 BASIC METABOLIC PNL TOTAL CA: CPT | Performed by: PHYSICIAN ASSISTANT

## 2023-11-08 PROCEDURE — 85007 BL SMEAR W/DIFF WBC COUNT: CPT | Performed by: PHYSICIAN ASSISTANT

## 2023-11-08 ASSESSMENT — PAIN SCALES - GENERAL: PAINLEVEL: MILD PAIN (2)

## 2023-11-08 NOTE — TELEPHONE ENCOUNTER
Called Charbel and LM regarding his labs showing a creatinine of 6.  Charbel did not answer.  I saw charbel this morning and ordered a CT scan of his temporal bones due to a very swollen left ear.  This was scheduled for 245 pm today but he did cancelled this appointment and it was not rescheduled.  Triage, please try and reach Charbel regarding his lab results.  He needs to go to the ER for RAYSHAWN and soft tissue infection tonight.  If you cannot reach him, please reach out to emergency contact     Addendum: 501 pm:Called bill again on home phone and spoke with him reagrding his results.  He states that his car  and he was unable to get to his CT scan.  Urged him to call his brother and go to the ER for RAYSHAWN and they can likely get the appropriate imaging.  He is agreeable

## 2023-11-08 NOTE — TELEPHONE ENCOUNTER
Call attempt #1.       Left message.     Called Charbel and LM regarding his labs showing a creatinine of 6.  Charbel did not answer.  I saw charbel this morning and ordered a CT scan of his temporal bones due to a very swollen left ear.  This was scheduled for 245 pm today but he did cancelled this appointment and it was not rescheduled.  Triage, please try and reach Charbel regarding his lab results.  He needs to go to the ER for RAYSHAWN and soft tissue infection tonight.  If you cannot reach him, please reach out to emergency contact      Call attempt #2.       Spoke to brother Ryan and he will give the PCP message to the patient.       Kimberley Echeverria RN  [facility]

## 2023-11-08 NOTE — PROGRESS NOTES
"  Assessment & Plan     CKD (chronic kidney disease) stage 5, GFR less than 15 ml/min (H)  Last creatinine was 3.5, Gfr 17.  Will recheck today but will need to do CT non contrast.  - BASIC METABOLIC PANEL; Future  - BASIC METABOLIC PANEL    Swelling of left ear  Labs and non contrast CT scan ordered to assess for underlying mastoiditis and/abscess/osteomyelitis. This was ordered stat.  He is afebrile and hemodynamically stable.  Will base treatment decision on results of CT scan.    - CBC with platelets and differential; Future  - CT Temporal Bones wo Contrast; Future  - CBC with platelets and differential       BMI:   Estimated body mass index is 27.39 kg/m  as calculated from the following:    Height as of this encounter: 1.67 m (5' 5.75\").    Weight as of this encounter: 76.4 kg (168 lb 6.4 oz).       Stan Crane PA-C  [facility]   Byr is a 73 year old, presenting for the following health issues:  Ear Problem (Swelling of left ear//)      11/8/2023     8:20 AM   Additional Questions   Roomed by Mari ABAD       Ear Problem    History of Present Illness       Reason for visit:  Left side of face  Symptom onset:  1-3 days ago    He eats 2-3 servings of fruits and vegetables daily.He consumes 1 sweetened beverage(s) daily.He exercises with enough effort to increase his heart rate 20 to 29 minutes per day.  He exercises with enough effort to increase his heart rate 6 days per week.   He is taking medications regularly.       Bry has a PMH CKD (stage 5) and CHF with three day hx of left sided ear swelling, pain and redness.  The swelling began spontaneously.  He denies any new exposures or insect bites.  It is very painful to lay on his left side.  He denies hearing los, or ear drainage. No fevers or other constitutional symptoms    Review of Systems   HENT:  Positive for ear pain.             Objective    /58 (BP Location: Left arm, Patient Position: Sitting, " "Cuff Size: Adult Regular)   Pulse 73   Temp 97.3  F (36.3  C) (Temporal)   Resp 18   Ht 1.67 m (5' 5.75\")   Wt 76.4 kg (168 lb 6.4 oz)   SpO2 93%   BMI 27.39 kg/m    Body mass index is 27.39 kg/m .  Physical Exam   GENERAL: healthy, alert and no distress  HENT: notable edema to the left ear along the helix, antihelix and tragus with mild erythema and warmth, EAC is swollen and I am unable to insert the otoscope due to the degree of swelling, no notable drainage.  Erythema and swelling extend to the left cheek.  He has TTP of the left ear and mastoid bone.                   "

## 2023-11-09 ENCOUNTER — TELEPHONE (OUTPATIENT)
Dept: FAMILY MEDICINE | Facility: CLINIC | Age: 73
End: 2023-11-09
Payer: COMMERCIAL

## 2023-11-09 DIAGNOSIS — N17.9 AKI (ACUTE KIDNEY INJURY) (H): Primary | ICD-10-CM

## 2023-11-09 DIAGNOSIS — L03.90 CELLULITIS, UNSPECIFIED CELLULITIS SITE: ICD-10-CM

## 2023-11-09 NOTE — TELEPHONE ENCOUNTER
Called Bill this morning and it does not look like he went to the ER.  LM for him to call me back.  He has RAYSHAWN and cellulitis of his left ear.  Needs to go to ER.  I am also sending a prescription for keflex to his pharmacy.  If he is choosing not to comply with the ER, he should take an antibiotic. This is keflex 250 mg daily ( dosed based on renal function).  Please reach out and try to speak with him about the importance of ER evaluation and whether there is anything we can do to help him

## 2023-11-09 NOTE — TELEPHONE ENCOUNTER
"Spoke with Bill.  He refuses to go to the ER, \" feeling healthy as a horse\".  He reports that he will go if he \" starts to feel it\".  Advised that his creatinine is 6.5 and this means that he is in kidney failure which would result in death if not treated promptly and appropriately.  He states understanding of this risk.  Strongly advised that he go in to be seen.  He continues to declines.  He has cellulitis of his left ear and possible deep infection but he cancelled his CT scan.  I did send a script for keflex to the pharmacy for him ( renal dosing).  Once again reiterated that he needs to have the CT scan to better manage this issue.  He continues to decline today  "

## 2023-11-09 NOTE — TELEPHONE ENCOUNTER
Patient Contact     Attempt # 1     Was call answered?    No  Left non-detailed message to call the clinic back at [phone].      On call back: Pt needs to go to ED. See message below from provider.    Vicky Tse RN

## 2024-03-28 DIAGNOSIS — I10 BENIGN ESSENTIAL HYPERTENSION: ICD-10-CM

## 2024-03-28 RX ORDER — FUROSEMIDE 20 MG
TABLET ORAL
Qty: 90 TABLET | Refills: 0 | Status: SHIPPED | OUTPATIENT
Start: 2024-03-28 | End: 2024-07-09

## 2024-03-28 RX ORDER — LISINOPRIL 5 MG/1
5 TABLET ORAL DAILY
Qty: 90 TABLET | Refills: 0 | Status: SHIPPED | OUTPATIENT
Start: 2024-03-28 | End: 2024-07-09

## 2024-05-04 NOTE — PLAN OF CARE
Problem: Goal Outcome Summary  Goal: Goal Outcome Summary  DC Planner OT   Patient plan for D/C: Home  Current status: Pt able to complete LB dressing independently, mod I with use of leg  for bed mobility to/from sit EOB.   Barriers to return to prior living situation: stairs, tub with shower doors  Recommendations for D/C: Home  Rationale for D/C recommendations: pt would benefit from home PT to advance independence within home.       Entered by: Melvina Mead 05/28/2017 9:31 AM      Pt has PARTIALLY MET OT GOALS, (tub transfer not met as pt will sponge bathe until able to safely transfer into tub), See discharge summary        show

## 2024-07-08 ENCOUNTER — TELEPHONE (OUTPATIENT)
Dept: FAMILY MEDICINE | Facility: CLINIC | Age: 74
End: 2024-07-08
Payer: COMMERCIAL

## 2024-07-08 DIAGNOSIS — I10 BENIGN ESSENTIAL HYPERTENSION: ICD-10-CM

## 2024-07-08 DIAGNOSIS — M54.9 ACUTE BILATERAL BACK PAIN, UNSPECIFIED BACK LOCATION: ICD-10-CM

## 2024-07-08 DIAGNOSIS — R30.0 DYSURIA: ICD-10-CM

## 2024-07-08 DIAGNOSIS — R10.9 FLANK PAIN: ICD-10-CM

## 2024-07-08 DIAGNOSIS — E78.2 MIXED HYPERLIPIDEMIA: ICD-10-CM

## 2024-07-09 RX ORDER — FUROSEMIDE 20 MG
TABLET ORAL
Qty: 90 TABLET | Refills: 0 | Status: SHIPPED | OUTPATIENT
Start: 2024-07-09 | End: 2024-09-17

## 2024-07-09 RX ORDER — ROSUVASTATIN CALCIUM 20 MG/1
20 TABLET, COATED ORAL DAILY
Qty: 90 TABLET | Refills: 0 | Status: SHIPPED | OUTPATIENT
Start: 2024-07-09 | End: 2024-09-17

## 2024-07-09 RX ORDER — LISINOPRIL 5 MG/1
5 TABLET ORAL DAILY
Qty: 90 TABLET | Refills: 0 | Status: SHIPPED | OUTPATIENT
Start: 2024-07-09 | End: 2024-09-17

## 2024-07-09 RX ORDER — TAMSULOSIN HYDROCHLORIDE 0.4 MG/1
0.4 CAPSULE ORAL DAILY
Qty: 90 CAPSULE | Refills: 0 | Status: SHIPPED | OUTPATIENT
Start: 2024-07-09 | End: 2024-09-17

## 2024-09-16 ENCOUNTER — TELEPHONE (OUTPATIENT)
Dept: FAMILY MEDICINE | Facility: CLINIC | Age: 74
End: 2024-09-16
Payer: COMMERCIAL

## 2024-09-16 DIAGNOSIS — I10 BENIGN ESSENTIAL HYPERTENSION: ICD-10-CM

## 2024-09-16 DIAGNOSIS — E78.2 MIXED HYPERLIPIDEMIA: ICD-10-CM

## 2024-09-16 DIAGNOSIS — R10.9 FLANK PAIN: ICD-10-CM

## 2024-09-16 DIAGNOSIS — M54.9 ACUTE BILATERAL BACK PAIN, UNSPECIFIED BACK LOCATION: ICD-10-CM

## 2024-09-16 DIAGNOSIS — R30.0 DYSURIA: ICD-10-CM

## 2024-09-16 NOTE — LETTER
September 27, 2024      Vaibhav Crabtree  [address] [address]      Hi Bill,    Our records indicate that it is time to schedule a visit with your primary care provider.  You are due to be seen for   medication check/Annual Physical Exam and fasting lab,.  We have sent to the pharmacy a 3 month refill of your medication until you can be seen by your provider.  You may call [phone] to schedule or via [a]list games using the appointment tab.  Schedules fill quickly, so we recommend scheduling at this time.       Thank you       [facility]

## 2024-09-17 RX ORDER — TAMSULOSIN HYDROCHLORIDE 0.4 MG/1
0.4 CAPSULE ORAL DAILY
Qty: 90 CAPSULE | Refills: 0 | Status: SHIPPED | OUTPATIENT
Start: 2024-09-17

## 2024-09-17 RX ORDER — LISINOPRIL 5 MG/1
5 TABLET ORAL DAILY
Qty: 90 TABLET | Refills: 0 | Status: SHIPPED | OUTPATIENT
Start: 2024-09-17

## 2024-09-17 RX ORDER — FUROSEMIDE 20 MG
TABLET ORAL
Qty: 90 TABLET | Refills: 0 | Status: SHIPPED | OUTPATIENT
Start: 2024-09-17

## 2024-09-17 RX ORDER — ROSUVASTATIN CALCIUM 20 MG/1
20 TABLET, COATED ORAL DAILY
Qty: 90 TABLET | Refills: 0 | Status: SHIPPED | OUTPATIENT
Start: 2024-09-17

## 2024-09-19 DIAGNOSIS — I10 BENIGN ESSENTIAL HYPERTENSION: ICD-10-CM

## 2024-09-19 RX ORDER — CARVEDILOL 6.25 MG/1
6.25 TABLET ORAL 2 TIMES DAILY WITH MEALS
Qty: 180 TABLET | Refills: 0 | Status: SHIPPED | OUTPATIENT
Start: 2024-09-19

## 2024-11-22 PROBLEM — I82.4Z9 DEEP VEIN THROMBOSIS (DVT) OF DISTAL VEIN OF LOWER EXTREMITY, UNSPECIFIED CHRONICITY, UNSPECIFIED LATERALITY (H): Status: ACTIVE | Noted: 2024-11-22

## 2025-05-23 DIAGNOSIS — R30.0 DYSURIA: ICD-10-CM

## 2025-05-23 DIAGNOSIS — I25.10 CORONARY ARTERY DISEASE INVOLVING NATIVE HEART WITHOUT ANGINA PECTORIS, UNSPECIFIED VESSEL OR LESION TYPE: ICD-10-CM

## 2025-05-23 DIAGNOSIS — I10 BENIGN ESSENTIAL HYPERTENSION: ICD-10-CM

## 2025-05-23 DIAGNOSIS — I50.9 CHRONIC CONGESTIVE HEART FAILURE, UNSPECIFIED HEART FAILURE TYPE (H): ICD-10-CM

## 2025-05-23 DIAGNOSIS — R10.9 FLANK PAIN: ICD-10-CM

## 2025-05-23 DIAGNOSIS — M54.9 ACUTE BILATERAL BACK PAIN, UNSPECIFIED BACK LOCATION: ICD-10-CM

## 2025-05-23 DIAGNOSIS — E78.2 MIXED HYPERLIPIDEMIA: ICD-10-CM

## 2025-05-23 RX ORDER — TAMSULOSIN HYDROCHLORIDE 0.4 MG/1
0.4 CAPSULE ORAL DAILY
Qty: 90 CAPSULE | Refills: 0 | Status: SHIPPED | OUTPATIENT
Start: 2025-05-23

## 2025-05-23 RX ORDER — CARVEDILOL 3.12 MG/1
3.12 TABLET ORAL 2 TIMES DAILY WITH MEALS
Qty: 180 TABLET | Refills: 0 | Status: SHIPPED | OUTPATIENT
Start: 2025-05-23

## 2025-05-23 RX ORDER — ROSUVASTATIN CALCIUM 20 MG/1
20 TABLET, COATED ORAL DAILY
Qty: 90 TABLET | Refills: 0 | Status: SHIPPED | OUTPATIENT
Start: 2025-05-23

## 2025-05-24 RX ORDER — FUROSEMIDE 20 MG/1
20 TABLET ORAL
Qty: 90 TABLET | Refills: 0 | Status: SHIPPED | OUTPATIENT
Start: 2025-05-24

## 2025-05-24 RX ORDER — LISINOPRIL 5 MG/1
5 TABLET ORAL DAILY
Qty: 90 TABLET | Refills: 0 | Status: SHIPPED | OUTPATIENT
Start: 2025-05-24

## 2025-08-21 DIAGNOSIS — R10.9 FLANK PAIN: ICD-10-CM

## 2025-08-21 DIAGNOSIS — R30.0 DYSURIA: ICD-10-CM

## 2025-08-21 DIAGNOSIS — I50.9 CHRONIC CONGESTIVE HEART FAILURE, UNSPECIFIED HEART FAILURE TYPE (H): ICD-10-CM

## 2025-08-21 DIAGNOSIS — I25.10 CORONARY ARTERY DISEASE INVOLVING NATIVE HEART WITHOUT ANGINA PECTORIS, UNSPECIFIED VESSEL OR LESION TYPE: ICD-10-CM

## 2025-08-21 DIAGNOSIS — E78.2 MIXED HYPERLIPIDEMIA: ICD-10-CM

## 2025-08-21 DIAGNOSIS — M54.9 ACUTE BILATERAL BACK PAIN, UNSPECIFIED BACK LOCATION: ICD-10-CM

## 2025-08-21 DIAGNOSIS — I10 BENIGN ESSENTIAL HYPERTENSION: ICD-10-CM

## 2025-08-21 RX ORDER — TAMSULOSIN HYDROCHLORIDE 0.4 MG/1
0.4 CAPSULE ORAL DAILY
Qty: 90 CAPSULE | Refills: 0 | Status: SHIPPED | OUTPATIENT
Start: 2025-08-21

## 2025-08-21 RX ORDER — LISINOPRIL 5 MG/1
5 TABLET ORAL DAILY
Qty: 90 TABLET | Refills: 0 | Status: SHIPPED | OUTPATIENT
Start: 2025-08-21

## 2025-08-21 RX ORDER — CARVEDILOL 3.12 MG/1
3.12 TABLET ORAL 2 TIMES DAILY WITH MEALS
Qty: 180 TABLET | Refills: 0 | Status: SHIPPED | OUTPATIENT
Start: 2025-08-21

## 2025-08-21 RX ORDER — FUROSEMIDE 20 MG/1
20 TABLET ORAL DAILY
Qty: 90 TABLET | Refills: 0 | Status: SHIPPED | OUTPATIENT
Start: 2025-08-21

## 2025-08-21 RX ORDER — ROSUVASTATIN CALCIUM 20 MG/1
20 TABLET, COATED ORAL DAILY
Qty: 90 TABLET | Refills: 0 | Status: SHIPPED | OUTPATIENT
Start: 2025-08-21

## (undated) DEVICE — SOL NACL 0.9% IRRIG 1000ML BOTTLE 07138-09

## (undated) DEVICE — MANIFOLD NEPTUNE 4 PORT 700-20

## (undated) DEVICE — GLOVE PROTEXIS POWDER FREE 8.5 ORTHOPEDIC 2D73ET85

## (undated) DEVICE — GLOVE PROTEXIS W/NEU-THERA 8.0  2D73TE80

## (undated) DEVICE — GLOVE PROTEXIS W/NEU-THERA 8.5  2D73TE85

## (undated) DEVICE — LINEN TOWEL PACK X5 5464

## (undated) DEVICE — SUCTION IRR SYSTEM W/O TIP INTERPULSE HANDPIECE 0210-100-000

## (undated) DEVICE — SYR 10ML FINGER CONTROL W/O NDL 309695

## (undated) DEVICE — PACK TOTAL KNEE SOP15TKFSD

## (undated) DEVICE — IMM KNEE 24" 0814-2664

## (undated) DEVICE — GLOVE PROTEXIS POWDER FREE 8.0 ORTHOPEDIC 2D73ET80

## (undated) DEVICE — DRAIN ROUND W/RESERV KIT JACKSON PRATT 10FR 400ML SU130-402D

## (undated) DEVICE — BONE CLEANING TIP INTERPULSE  0210-010-000

## (undated) DEVICE — DRAPE SHEET REV FOLD 3/4 9349

## (undated) DEVICE — DRSG KERLIX FLUFFS X5

## (undated) DEVICE — NDL 18GA 1.5" 305196

## (undated) DEVICE — DRSG GAUZE 4X4" 3033

## (undated) DEVICE — SU VICRYL 2-0 CP-1 27" UND J266H

## (undated) DEVICE — DRSG ABDOMINAL 07 1/2X8" 7197D

## (undated) DEVICE — DRSG ADAPTIC 3X8" 6113

## (undated) DEVICE — NDL 22GA 1.5"

## (undated) DEVICE — BONE CEMENT MIXEVAC III HI VAC KIT  0206-015-000

## (undated) DEVICE — ESU GROUND PAD UNIVERSAL W/O CORD

## (undated) DEVICE — SU WND CLOSURE VLOC 180 ABS 0 24" GS-25 VLOCL0436

## (undated) DEVICE — SU ETHIBOND 0 CTX CR  8X18" CX31D

## (undated) DEVICE — BLADE SAW SAGITTAL STRK 19.5X95X1.27MM 2108-109-000S15

## (undated) DEVICE — SYR 30ML LL W/O NDL

## (undated) DEVICE — GLOVE PROTEXIS BLUE W/NEU-THERA 8.5  2D73EB85

## (undated) DEVICE — Device

## (undated) RX ORDER — PROPOFOL 10 MG/ML
INJECTION, EMULSION INTRAVENOUS
Status: DISPENSED
Start: 2017-05-24

## (undated) RX ORDER — CEFAZOLIN SODIUM 2 G/100ML
INJECTION, SOLUTION INTRAVENOUS
Status: DISPENSED
Start: 2017-05-24

## (undated) RX ORDER — HYDROMORPHONE HYDROCHLORIDE 1 MG/ML
INJECTION, SOLUTION INTRAMUSCULAR; INTRAVENOUS; SUBCUTANEOUS
Status: DISPENSED
Start: 2017-05-24

## (undated) RX ORDER — FENTANYL CITRATE 50 UG/ML
INJECTION, SOLUTION INTRAMUSCULAR; INTRAVENOUS
Status: DISPENSED
Start: 2017-05-24

## (undated) RX ORDER — OXYCODONE HCL 10 MG/1
TABLET, FILM COATED, EXTENDED RELEASE ORAL
Status: DISPENSED
Start: 2017-05-24

## (undated) RX ORDER — ONDANSETRON 2 MG/ML
INJECTION INTRAMUSCULAR; INTRAVENOUS
Status: DISPENSED
Start: 2017-05-24

## (undated) RX ORDER — FENTANYL CITRATE 50 UG/ML
INJECTION, SOLUTION INTRAMUSCULAR; INTRAVENOUS
Status: DISPENSED
Start: 2017-01-24

## (undated) RX ORDER — FENTANYL CITRATE 50 UG/ML
INJECTION, SOLUTION INTRAMUSCULAR; INTRAVENOUS
Status: DISPENSED
Start: 2017-05-26